# Patient Record
Sex: MALE | Race: WHITE | Employment: OTHER | URBAN - METROPOLITAN AREA
[De-identification: names, ages, dates, MRNs, and addresses within clinical notes are randomized per-mention and may not be internally consistent; named-entity substitution may affect disease eponyms.]

---

## 2017-01-04 ENCOUNTER — HOSPITAL ENCOUNTER (OUTPATIENT)
Dept: PHYSICAL THERAPY | Age: 82
Discharge: HOME | End: 2017-01-04
Payer: MEDICARE

## 2017-01-04 PROCEDURE — 97110 THERAPEUTIC EXERCISES: CPT

## 2017-01-04 NOTE — PROGRESS NOTES
PT DAILY TREATMENT NOTE - [facility]     Patient Name: Alex Forbes  Date:2017  : 1933  [x]  Patient  Verified  Payor: Essence Sharma / Plan: [place] MED[place] & B / Product Type: Medicare /    In time:10:25  Out time:11:15  Total Treatment Time (min): 50  Total Timed Codes (min): 40  1:1 Treatment Time ( [address] only): 40   Visit #: 2 of 12    Treatment Area: Leg pain, right [M79.604]    SUBJECTIVE  Pain Level (0-10 scale): 5/10  Any medication changes, allergies to medications, adverse drug reactions, diagnosis change, or new procedure performed?: [x] No    [] Yes (see summary sheet for update)  Subjective functional status/changes:   [] No changes reported  The pt reports that he has fallen twice since he was here last but he doesn't think that the falls have made his leg pain worse.     OBJECTIVE    Modality rationale: decrease inflammation and decrease pain to improve the patients ability to walk   Min Type Additional Details    [] Estim:  []Unatt       []IFC  []Premod                        []Other:  []w/ice   []w/heat  Position:  Location:    [] Estim: []Att    []TENS instruct  []NMES                    []Other:  []w/US   []w/ice   []w/heat  Position:  Location:    []  Traction: [] Cervical       []Lumbar                       [] Prone          []Supine                       []Intermittent   []Continuous Lbs:  [] before manual  [] after manual    []  Ultrasound: []Continuous   [] Pulsed                           []1MHz   []3MHz W/cm2:  Location:    []  Iontophoresis with dexamethasone         Location: [] Take home patch   [] In clinic   10 [x]  Ice     []  heat  []  Ice massage  []  Laser   []  Anodyne Position: supine, legs elevated  Location: right buttock/hamstring    []  Laser with stim  []  Other:  Position:  Location:    []  Vasopneumatic Device Pressure:       [] lo [] med [] hi   Temperature: [] lo [] med [] hi   [] Skin assessment post-treatment:  []intact []redness- no adverse reaction []redness  adverse reaction:      min []Eval                  []Re-Eval       40 min Therapeutic Exercise:  [x] See flow sheet :   Rationale: increase ROM and increase strength to improve the patients ability to walk     min Therapeutic Activity:  []  See flow sheet :   Rationale:       min Neuromuscular Re-education:  []  See flow sheet :   Rationale:      min Manual Therapy:     Rationale:      min Gait Training:  ___ feet with ___ device on level surfaces with ___ level of assist   Rationale: With   [] TE   [] TA   [] neuro   [] other: Patient Education: [x] Review HEP    [] Progressed/Changed HEP based on:   [] positioning   [] body mechanics   [] transfers   [] heat/ice application    [] other:      Other Objective/Functional Measures:      Pain Level (0-10 scale) post treatment: 2/10    ASSESSMENT/Changes in Function: The pt didn't have any significant increase in pain with exercises today but did have the most difficulty with weight shifting because shifting over his right LE aggravated him. He got good relief from ice. Patient will continue to benefit from skilled PT services to modify and progress therapeutic interventions, address functional mobility deficits, address ROM deficits, address strength deficits, analyze and cue movement patterns, analyze and modify body mechanics/ergonomics, address imbalance/dizziness and instruct in home and community integration to attain remaining goals.      [x]  See Plan of Care  []  See progress note/recertification  []  See Discharge Summary           PLAN  [x]  Upgrade activities as tolerated     [x]  Continue plan of care  []  Update interventions per flow sheet       []  Discharge due to:_  []  Other:_      Garret German, EULOGIO 1/4/2017  10:31 AM    Future Appointments  Date Time Provider Flaquita Gamez   1/5/2017 8:30 AM Shania Truong [facility]   1/6/2017 9:00 AM Judy Post PTA [facility]   1/9/2017 12:00 PM EULOGIO Truong [facility] 1/12/2017 1:00 PM Clint Santana PT [facility]   1/13/2017 10:30 AM Wolfgang Morris PT [facility]   1/17/2017 11:00 AM Wolfgang Morris PT [facility]   1/19/2017 10:30 AM [facility] PT MARISOLY [facility]   1/20/2017 11:00 AM [facility] PT VICTORY [facility]   1/24/2017 11:00 AM Grant Centeno PTA [facility]   1/26/2017 11:00 AM Clint Santana PT [facility]   1/27/2017 10:30 AM [facility] PT MARISOLY [facility]

## 2017-01-05 ENCOUNTER — HOSPITAL ENCOUNTER (OUTPATIENT)
Dept: PHYSICAL THERAPY | Age: 82
Discharge: HOME | End: 2017-01-05
Payer: MEDICARE

## 2017-01-05 PROCEDURE — 97110 THERAPEUTIC EXERCISES: CPT

## 2017-01-05 NOTE — PROGRESS NOTES
PT DAILY TREATMENT NOTE - [facility]     Patient Name: Nicole Romano  Date:2017  : 1933  [x]  Patient  Verified  Payor: [place] MEDICARE / Plan: [place] MEDICARE PART A & B / Product Type: Medicare /    In time:830  Out time:913  Total Treatment Time (min): 43  Total Timed Codes (min): 33  1:1 Treatment Time ( only): 33   Visit #: 3 of 12    Treatment Area: Leg pain, right [M79.604]    SUBJECTIVE  Pain Level (0-10 scale): 6/10  Any medication changes, allergies to medications, adverse drug reactions, diagnosis change, or new procedure performed?: [x] No    [] Yes (see summary sheet for update)  Subjective functional status/changes:   [x] No changes reported      OBJECTIVE    Modality rationale: decrease inflammation and decrease pain to improve the patients ability to increase activity/position tolerance   Min Type Additional Details    [] Estim:  []Unatt       []IFC  []Premod                        []Other:  []w/ice   []w/heat  Position:  Location:    [] Estim: []Att    []TENS instruct  []NMES                    []Other:  []w/US   []w/ice   []w/heat  Position:  Location:    []  Traction: [] Cervical       []Lumbar                       [] Prone          []Supine                       []Intermittent   []Continuous Lbs:  [] before manual  [] after manual    []  Ultrasound: []Continuous   [] Pulsed                           []1MHz   []3MHz W/cm2:  Location:    []  Iontophoresis with dexamethasone         Location: [] Take home patch   [] In clinic   10 [x]  Ice     []  heat  []  Ice massage  []  Laser   []  Anodyne Position: H/L supine  Location: right buttock/HS origin    []  Laser with stim  []  Other:  Position:  Location:    []  Vasopneumatic Device Pressure:       [] lo [] med [] hi   Temperature: [] lo [] med [] hi   [] Skin assessment post-treatment:  []intact []redness- no adverse reaction    []redness  adverse reaction:      min []Eval                  []Re-Eval       33 min Therapeutic Exercise:  [x] See flow sheet :   Rationale: increase ROM, increase strength, improve balance and increase proprioception to improve the patients ability to normalize gait and function     min Therapeutic Activity:  []  See flow sheet :         min Neuromuscular Re-education:  []  See flow sheet :        min Manual Therapy:          min Gait Training:  ___ feet with ___ device on level surfaces with ___ level of assist   Rationale: With   [] TE   [] TA   [] neuro   [] other: Patient Education: [x] Review HEP    [] Progressed/Changed HEP based on:   [] positioning   [] body mechanics   [] transfers   [] heat/ice application    [] other:      Other Objective/Functional Measures: see goal review     Pain Level (0-10 scale) post treatment: 4/10    ASSESSMENT/Changes in Function: Pt has difficulty transferring sit to stand from w/c requiring at least mod assist and transfer also complicated by brakes not locking on w/c. Patient will continue to benefit from skilled PT services to modify and progress therapeutic interventions, address functional mobility deficits, address ROM deficits, address strength deficits, analyze and cue movement patterns, assess and modify postural abnormalities and address imbalance/dizziness to attain remaining goals. []  See Plan of Care  []  See progress note/recertification  []  See Discharge Summary           Short Term Goals: To be accomplished in 6 treatments:  1)The pt will report no greater than 6/10 at worst to improve his tolerance to walking. Status at eval: 10/10 at worst  Current Status: 10/10 at worst     Long Term Goals: To be accomplished in 12 treatments:  1) Seated right hamstring strength will increase to 5/5 without pain to improve his tolerance to climbing stairs. Status at eval: 4-/5 with slight pain  Current Status: 4-/5 with pain     2) Right hip abduction strength will increase to 4/5 to provide pelvic stability for standing.   Status at eval: 3+/5 with pain  Current Status: no change     3) Right knee ext strength will increase to 5/5 to improve his ability to move from sit to stand.   Status at eval:4/5  Current Status: 4/5     PLAN  [x]  Upgrade activities as tolerated     [x]  Continue plan of care  []  Update interventions per flow sheet       []  Discharge due to:_  []  Other:_      Blanka Womack PT 1/5/2017  8:34 AM    Future Appointments  Date Time Provider Falquita Gamez   1/6/2017 9:00 AM Omari Caban PTA [facility]   1/9/2017 12:00 PM Blanka Laos, PT [facility]   1/12/2017 1:00 PM Blanka Laos, PT [facility]   1/13/2017 10:30 AM Blanka Laos, PT [facility]   1/17/2017 11:00 AM Stareagane Alexandra, PT [facility]   1/19/2017 10:30 AM Stareagane Alexandra, PT [facility] [place]   1/20/2017 11:00 AM [facility] PT VICTORY [facility]   1/24/2017 11:00 AM Omari Caban PTA [facility]   1/26/2017 11:00 AM Wolfgang Morris, PT [facility]   1/27/2017 10:30 AM [facility] PT VICTORY [facility]

## 2017-01-06 ENCOUNTER — HOSPITAL ENCOUNTER (OUTPATIENT)
Dept: PHYSICAL THERAPY | Age: 82
End: 2017-01-06
Payer: MEDICARE

## 2017-01-12 ENCOUNTER — HOSPITAL ENCOUNTER (OUTPATIENT)
Dept: PHYSICAL THERAPY | Age: 82
Discharge: HOME | End: 2017-01-12
Payer: MEDICARE

## 2017-01-12 PROCEDURE — 97110 THERAPEUTIC EXERCISES: CPT

## 2017-01-12 NOTE — PROGRESS NOTES
PT DAILY TREATMENT NOTE - [facility]     Patient Name: Sugar Randolph  Date:2017  : 1933  [x]  Patient  Verified  Payor: VA MEDICARE / Plan: VA MEDICARE PART A & B / Product Type: Medicare /    In time:1255  Out time:143  Total Treatment Time (min): 48  Total Timed Codes (min): 38  1:1 Treatment Time ( [address] only): 38   Visit #: 4 of 12    Treatment Area: Leg pain, right [M79.604]    SUBJECTIVE  Pain Level (0-10 scale): 4/10  Any medication changes, allergies to medications, adverse drug reactions, diagnosis change, or new procedure performed?: [] No    [x] Yes (see summary sheet for update)  Subjective functional status/changes:   [] No changes reported  I cancelled my appointment last Friday because I had to go to ER with this right leg. They diagnosed it as cellulitis and told me to follow-up with primary physician.     OBJECTIVE    Modality rationale: decrease inflammation and decrease pain to improve the patients ability to increase activity/position tolerance   Min Type Additional Details    [] Estim:  []Unatt       []IFC  []Premod                        []Other:  []w/ice   []w/heat  Position:  Location:    [] Estim: []Att    []TENS instruct  []NMES                    []Other:  []w/US   []w/ice   []w/heat  Position:  Location:    []  Traction: [] Cervical       []Lumbar                       [] Prone          []Supine                       []Intermittent   []Continuous Lbs:  [] before manual  [] after manual    []  Ultrasound: []Continuous   [] Pulsed                           []1MHz   []3MHz W/cm2:  Location:    []  Iontophoresis with dexamethasone         Location: [] Take home patch   [] In clinic   10 [x]  Ice     []  heat  []  Ice massage  []  Laser   []  Anodyne Position: H/L supine  Location:right buttock/HS region    []  Laser with stim  []  Other:  Position:  Location:    []  Vasopneumatic Device Pressure:       [] lo [] med [] hi   Temperature: [] lo [] med [] hi   [] Skin assessment post-treatment:  []intact []redness- no adverse reaction    []redness  adverse reaction:      min []Eval                  []Re-Eval       38 min Therapeutic Exercise:  [x] See flow sheet :   Rationale: increase ROM, increase strength, improve balance and increase proprioception to improve the patients ability to normalize gait and function     min Therapeutic Activity:  []  See flow sheet :         min Neuromuscular Re-education:  []  See flow sheet :        min Manual Therapy:          min Gait Training:  ___ feet with ___ device on level surfaces with ___ level of assist   Rationale: With   [] TE   [] TA   [] neuro   [] other: Patient Education: [x] Review HEP    [] Progressed/Changed HEP based on:   [] positioning   [] body mechanics   [] transfers   [] heat/ice application    [] other:      Other Objective/Functional Measures: none taken today     Pain Level (0-10 scale) post treatment: 2/10    ASSESSMENT/Changes in Function: No new progress with pt hospitalized in past week secondary to cellulitis. Patient will continue to benefit from skilled PT services to modify and progress therapeutic interventions, address functional mobility deficits, address ROM deficits, address strength deficits, analyze and address soft tissue restrictions, analyze and cue movement patterns, assess and modify postural abnormalities and address imbalance/dizziness to attain remaining goals. []  See Plan of Care  []  See progress note/recertification  []  See Discharge Summary         Progress towards goals / Updated goals:  Short Term Goals: To be accomplished in 6 treatments:  1)The pt will report no greater than 6/10 at worst to improve his tolerance to walking. Status at eval: 10/10 at worst  Current Status: 10/10 at worst with ER visit in past week      Long Term Goals:  To be accomplished in 12 treatments:  1) Seated right hamstring strength will increase to 5/5 without pain to improve his tolerance to climbing stairs. Status at eval: 4-/5 with slight pain  Current Status: no change      2) Right hip abduction strength will increase to 4/5 to provide pelvic stability for standing. Status at eval: 3+/5 with pain  Current Status: no change      3) Right knee ext strength will increase to 5/5 to improve his ability to move from sit to stand.   Status at eval:4/5  Current Status: no change    PLAN  [x]  Upgrade activities as tolerated     [x]  Continue plan of care  []  Update interventions per flow sheet       []  Discharge due to:_  []  Other:_      Romelia Scott PT 1/12/2017  1:02 PM    Future Appointments  Date Time Provider Flaquita Gamez   1/13/2017 10:30 AM EULOGIO JoynerHPTPETE [facility]   1/17/2017 11:00 AM Romelia Scott PT [facility]   1/19/2017 10:30 AM Romelia Scott PT [facility]   1/20/2017 11:00 AM Romelia Scott PT [facility]   1/24/2017 11:00 AM Gibran Govea PTA [facility]   1/26/2017 11:00 AM Romelia Scott PT [facility]   1/27/2017 10:30 AM Romelia Scott PT [facility]

## 2017-01-13 ENCOUNTER — HOSPITAL ENCOUNTER (OUTPATIENT)
Dept: PHYSICAL THERAPY | Age: 82
Discharge: HOME | End: 2017-01-13
Payer: MEDICARE

## 2017-01-13 PROCEDURE — 97110 THERAPEUTIC EXERCISES: CPT

## 2017-01-13 NOTE — PROGRESS NOTES
PT DAILY TREATMENT NOTE - [facility]     Patient Name: Sean Garcia  Date:2017  : 1933  [x]  Patient  Verified  Payor: VA MEDICARE / Plan: VA MEDICARE PART A & B / Product Type: Medicare /    In time:1030  Out time:1118  Total Treatment Time (min): 48  Total Timed Codes (min): 38  1:1 Treatment Time ( only): 38   Visit #: 5 of 12    Treatment Area: Leg pain, right [M79.604]    SUBJECTIVE  Pain Level (0-10 scale): 3/10  Any medication changes, allergies to medications, adverse drug reactions, diagnosis change, or new procedure performed?: [x] No    [] Yes (see summary sheet for update)  Subjective functional status/changes:   [x] No changes reported      OBJECTIVE    Modality rationale: decrease inflammation and decrease pain to improve the patients ability to increase activity/position tolerance   Min Type Additional Details    [] Estim:  []Unatt       []IFC  []Premod                        []Other:  []w/ice   []w/heat  Position:  Location:    [] Estim: []Att    []TENS instruct  []NMES                    []Other:  []w/US   []w/ice   []w/heat  Position:  Location:    []  Traction: [] Cervical       []Lumbar                       [] Prone          []Supine                       []Intermittent   []Continuous Lbs:  [] before manual  [] after manual    []  Ultrasound: []Continuous   [] Pulsed                           []1MHz   []3MHz W/cm2:  Location:    []  Iontophoresis with dexamethasone         Location: [] Take home patch   [] In clinic   10 [x]  Ice     []  heat  []  Ice massage  []  Laser   []  Anodyne Position: H/L supine  Location:right buttock/HS region    []  Laser with stim  []  Other:  Position:  Location:    []  Vasopneumatic Device Pressure:       [] lo [] med [] hi   Temperature: [] lo [] med [] hi   [] Skin assessment post-treatment:  []intact []redness- no adverse reaction    []redness  adverse reaction:      min []Eval                  []Re-Eval       38 min Therapeutic Exercise: [x] See flow sheet :   Rationale: increase ROM, increase strength, improve balance and increase proprioception to improve the patients ability in prep for standing and walking activity     min Therapeutic Activity:  []  See flow sheet :         min Neuromuscular Re-education:  []  See flow sheet :        min Manual Therapy:         min Gait Training:  ___ feet with ___ device on level surfaces with ___ level of assist   Rationale: With   [] TE   [] TA   [] neuro   [] other: Patient Education: [x] Review HEP    [] Progressed/Changed HEP based on:   [] positioning   [] body mechanics   [] transfers   [] heat/ice application    [] other:      Other Objective/Functional Measures:   FOTO score: 48/100    Pain Level (0-10 scale) post treatment: 4/10    ASSESSMENT/Changes in Function: No change in FOTO score from [facility]. Patient will continue to benefit from skilled PT services to modify and progress therapeutic interventions, address functional mobility deficits, address ROM deficits, address strength deficits, analyze and address soft tissue restrictions, analyze and cue movement patterns, assess and modify postural abnormalities and address imbalance/dizziness to attain remaining goals.      [x]  See Plan of Care  []  See progress note/recertification  []  See Discharge Summary      PLAN  [x]  Upgrade activities as tolerated     [x]  Continue plan of care  []  Update interventions per flow sheet       []  Discharge due to:_  [x]  Other: attempt ambulation with walker next visit as indicated/appropriate     Maddy Nielsen PT 1/13/2017  10:36 AM    Future Appointments  Date Time Provider Flaquita Gamez   1/17/2017 11:00 AM Maddy Nielsen PT [facility]   1/19/2017 10:30 AM Maddy Nielsen PT [facility]   1/20/2017 11:00 AM Maddy Nielsen PT [facility]   1/24/2017 11:00 AM Rohit Jeronimo, ALFRED WHITFIELD [facility]   1/26/2017 11:00 AM Maddy Nielsen, PT [facility]   1/27/2017 10:30 AM Maddy Nielsen, PT [facility]

## 2017-01-17 ENCOUNTER — HOSPITAL ENCOUNTER (OUTPATIENT)
Dept: PHYSICAL THERAPY | Age: 82
Discharge: HOME | End: 2017-01-17
Payer: MEDICARE

## 2017-01-17 PROCEDURE — 97110 THERAPEUTIC EXERCISES: CPT

## 2017-01-17 NOTE — PROGRESS NOTES
PT DAILY TREATMENT NOTE - [facility]     Patient Name: Dawit Kirby  Date:2017  : 1933  [x]  Patient  Verified  Payor: [place] MEDICARE / Plan: [place] MEDICARE PART A & B / Product Type: Medicare /    In time:1055  Out time:1148  Total Treatment Time (min): 53  Total Timed Codes (min): 43  1:1 Treatment Time ( [address] only): 37   Visit #: 6 of 12    Treatment Area: Leg pain, right [M79.604]    SUBJECTIVE  Pain Level (0-10 scale): 10  Any medication changes, allergies to medications, adverse drug reactions, diagnosis change, or new procedure performed?: [x] No    [] Yes (see summary sheet for update)  Subjective functional status/changes:   [] No changes reported  I decided today I was going to walk with the walker.     OBJECTIVE    Modality rationale: decrease inflammation and decrease pain to improve the patients ability to increase activity/position tolerance   Min Type Additional Details    [] Estim:  []Unatt       []IFC  []Premod                        []Other:  []w/ice   []w/heat  Position:  Location:    [] Estim: []Att    []TENS instruct  []NMES                    []Other:  []w/US   []w/ice   []w/heat  Position:  Location:    []  Traction: [] Cervical       []Lumbar                       [] Prone          []Supine                       []Intermittent   []Continuous Lbs:  [] before manual  [] after manual    []  Ultrasound: []Continuous   [] Pulsed                           []1MHz   []3MHz W/cm2:  Location:    []  Iontophoresis with dexamethasone         Location: [] Take home patch   [] In clinic   10 [x]  Ice     []  heat  []  Ice massage  []  Laser   []  Anodyne Position: H/L supine  Location: right buttock/HS origin    []  Laser with stim  []  Other:  Position:  Location:    []  Vasopneumatic Device Pressure:       [] lo [] med [] hi   Temperature: [] lo [] med [] hi   [] Skin assessment post-treatment:  []intact []redness- no adverse reaction    []redness  adverse reaction:      min []Eval []Re-Eval       43 min Therapeutic Exercise:  [x] See flow sheet :   Rationale: increase ROM, increase strength, improve balance and increase proprioception to improve the patients ability to normalize gait and function     min Therapeutic Activity:  []  See flow sheet :         min Neuromuscular Re-education:  []  See flow sheet :        min Manual Therapy:          min Gait Training:  ___ feet with ___ device on level surfaces with ___ level of assist   Rationale: With   [] TE   [] TA   [] neuro   [] other: Patient Education: [x] Review HEP    [] Progressed/Changed HEP based on:   [] positioning   [] body mechanics   [] transfers   [] heat/ice application    [] other:      Other Objective/Functional Measures: see goal review     Pain Level (0-10 scale) post treatment: 3/10    ASSESSMENT/Changes in Function: Pt ambulating into clinic today with RW with decreased eloy and forward flexed posture. He was able to continue to ambulate short distances in clinic with RW and SBA-supervision of therapist. Pain intensity slowly decreasing but still relatively high. Right HS strength mildly improving at best.  Pt has tendency to post lean with sit to stand transfers resulting in LOB backwards. He is able to correct with verbal and manual cues. Patient will continue to benefit from skilled PT services to modify and progress therapeutic interventions, address functional mobility deficits, address ROM deficits, address strength deficits, analyze and address soft tissue restrictions, analyze and cue movement patterns, assess and modify postural abnormalities and address imbalance/dizziness to attain remaining goals. []  See Plan of Care  []  See progress note/recertification  []  See Discharge Summary         Progress towards goals / Updated goals:  Short Term Goals: To be accomplished in 6 treatments:  1)The pt will report no greater than 6/10 at worst to improve his tolerance to walking.   Status at eval: 10/10 at worst  Current Status: slowly progressin/10 at worst      Long Term Goals: To be accomplished in 12 treatments:  1) Seated right hamstring strength will increase to 5/5 without pain to improve his tolerance to climbing stairs. Status at eval: 4-/5 with slight pain  Current Status: not met: 4/5 at best with slight pain      2) Right hip abduction strength will increase to 4/5 to provide pelvic stability for standing. Status at eval: 3+/5 with pain  Current Status: no change      3) Right knee ext strength will increase to 5/5 to improve his ability to move from sit to stand.   Status at eval:4/5  Current Status: not met: 4/5    PLAN  [x]  Upgrade activities as tolerated     [x]  Continue plan of care  []  Update interventions per flow sheet       []  Discharge due to:_  [x]  Other: progress amb with RW and standing exercises as tolerated    Rochelle Das PT 2017  10:53 AM    Future Appointments  Date Time Provider Flaquita Gamez   2017 11:00 AM EULOGIO Melo [facility]   2017 10:30 AM ALFRED Batista [facility]   2017 11:00 AM EULOGIO Melo [facility]   2017 11:00 AM ALFRED Batista [facility]   2017 11:00 AM EULOGIO Melo [facility]   2017 10:30 AM EULOGIO Melo [facility]

## 2017-01-19 ENCOUNTER — HOSPITAL ENCOUNTER (OUTPATIENT)
Dept: PHYSICAL THERAPY | Age: 82
Discharge: HOME | End: 2017-01-19
Payer: MEDICARE

## 2017-01-19 PROCEDURE — 97110 THERAPEUTIC EXERCISES: CPT

## 2017-01-19 NOTE — PROGRESS NOTES
PT DAILY TREATMENT NOTE - [facility]     Patient Name: Thom Knott  Date:2017  : 1933  [x]  Patient  Verified  Payor: [place] MEDICARE / Plan: [place] MEDICARE PART A & B / Product Type: Medicare /    In time:1035  Out time:1143  Total Treatment Time (min): 68  Total Timed Codes (min): 55  1:1 Treatment Time ([address] only): 40   Visit #: 7 of 12    Treatment Area: Leg pain, right [M79.604]    SUBJECTIVE  Pain Level (0-10 scale): 7/10  Any medication changes, allergies to medications, adverse drug reactions, diagnosis change, or new procedure performed?: [x] No    [] Yes (see summary sheet for update)  Subjective functional status/changes:   [] No changes reported  I did a lot yesterday on my feet.     OBJECTIVE    Modality rationale: decrease inflammation and decrease pain to improve the patients ability to improve gait and ADL's   Min Type Additional Details    [] Estim:  []Unatt       []IFC  []Premod                        []Other:  []w/ice   []w/heat  Position:  Location:    [] Estim: []Att    []TENS instruct  []NMES                    []Other:  []w/US   []w/ice   []w/heat  Position:  Location:    []  Traction: [] Cervical       []Lumbar                       [] Prone          []Supine                       []Intermittent   []Continuous Lbs:  [] before manual  [] after manual    []  Ultrasound: []Continuous   [] Pulsed                           []1MHz   []3MHz W/cm2:  Location:    []  Iontophoresis with dexamethasone         Location: [] Take home patch   [] In clinic   10 [x]  Ice     []  heat  []  Ice massage  []  Laser   []  Anodyne Position:HL  Location:right proximal HS    []  Laser with stim  []  Other:  Position:  Location:    []  Vasopneumatic Device Pressure:       [] lo [] med [] hi   Temperature: [] lo [] med [] hi   [x] Skin assessment post-treatment:  [x]intact []redness- no adverse reaction    []redness  adverse reaction:      min []Eval                  []Re-Eval       55 (40) min Therapeutic Exercise:  [x] See flow sheet :   Rationale: increase ROM, increase strength and improve coordination to improve the patients ability to improve gait and function     min Therapeutic Activity:  []  See flow sheet :         min Neuromuscular Re-education:  []  See flow sheet :        min Manual Therapy:          min Gait Training:  ___ feet with ___ device on level surfaces with ___ level of assist   Rationale: With   [] TE   [] TA   [] neuro   [] other: Patient Education: [x] Review HEP    [] Progressed/Changed HEP based on:   [] positioning   [] body mechanics   [] transfers   [] heat/ice application    [] other:      Other Objective/Functional Measures:      Pain Level (0-10 scale) post treatment: 3/10    ASSESSMENT/Changes in Function: Pt reported improvements in right LE pain at end of tx today. Pt ambulating using RW without safety concerns. Patient will continue to benefit from skilled PT services to modify and progress therapeutic interventions, address functional mobility deficits, address ROM deficits, address strength deficits, analyze and address soft tissue restrictions, analyze and cue movement patterns, analyze and modify body mechanics/ergonomics and instruct in home and community integration to attain remaining goals. []  See Plan of Care  []  See progress note/recertification  []  See Discharge Summary         Progress towards goals / Updated goals:  Short Term Goals: To be accomplished in 6 treatments:  1)The pt will report no greater than 6/10 at worst to improve his tolerance to walking. Status at eval: 10/10 at worst  Current Status: slowly progressin/10 at worst      Long Term Goals: To be accomplished in 12 treatments:  1) Seated right hamstring strength will increase to 5/5 without pain to improve his tolerance to climbing stairs.   Status at eval: 4-/5 with slight pain  Current Status: not met: 4/5 at best with slight pain      2) Right hip abduction strength will increase to 4/5 to provide pelvic stability for standing. Status at eval: 3+/5 with pain  Current Status: no change      3) Right knee ext strength will increase to 5/5 to improve his ability to move from sit to stand.   Status at eval:4/5  Current Status: not met: 4/5       PLAN  [x]  Upgrade activities as tolerated     [x]  Continue plan of care  []  Update interventions per flow sheet       []  Discharge due to:_  []  Other:_      Samreen Flores, ALFRED 1/19/2017  11:41 AM    Future Appointments  Date Time Provider Flaquita Gamez   1/20/2017 11:00 AM Cezar May PT NA[facility]   1/24/2017 11:00 AM EULOGIO Clark [facility]   1/26/2017 11:00 AM Cezar May PT [facility]   1/27/2017 10:30 AM Cezar May PT NAH[facility]

## 2017-01-20 ENCOUNTER — HOSPITAL ENCOUNTER (OUTPATIENT)
Dept: PHYSICAL THERAPY | Age: 82
Discharge: HOME | End: 2017-01-20
Payer: MEDICARE

## 2017-01-20 PROCEDURE — 97110 THERAPEUTIC EXERCISES: CPT

## 2017-01-20 NOTE — PROGRESS NOTES
PT DAILY TREATMENT NOTE - [facility]     Patient Name: Hernandez Baptiste  Date:2017  : 1933  [x]  Patient  Verified  Payor: [place] Harrington / Plan: [place] MEDICARE PART A & B / Product Type: Medicare /    In time:1053  Out time:1143  Total Treatment Time (min): 50  Total Timed Codes (min): 40  1:1 Treatment Time ( only): 23   Visit #: 8 of 12    Treatment Area: Leg pain, right [M79.604]    SUBJECTIVE  Pain Level (0-10 scale): 610  Any medication changes, allergies to medications, adverse drug reactions, diagnosis change, or new procedure performed?: [x] No    [] Yes (see summary sheet for update)  Subjective functional status/changes:   [] No changes reported  I have a radio nerve ablation procedure scheduled for  with pain management.     OBJECTIVE    Modality rationale:    Min Type Additional Details    [] Estim:  []Unatt       []IFC  []Premod                        []Other:  []w/ice   []w/heat  Position:  Location:    [] Estim: []Att    []TENS instruct  []NMES                    []Other:  []w/US   []w/ice   []w/heat  Position:  Location:    []  Traction: [] Cervical       []Lumbar                       [] Prone          []Supine                       []Intermittent   []Continuous Lbs:  [] before manual  [] after manual    []  Ultrasound: []Continuous   [] Pulsed                           []1MHz   []3MHz W/cm2:  Location:    []  Iontophoresis with dexamethasone         Location: [] Take home patch   [] In clinic    []  Ice     []  heat  []  Ice massage  []  Laser   []  Anodyne Position:  Location:    []  Laser with stim  []  Other:  Position:  Location:    []  Vasopneumatic Device Pressure:       [] lo [] med [] hi   Temperature: [] lo [] med [] hi   [] Skin assessment post-treatment:  []intact []redness- no adverse reaction    []redness  adverse reaction:      min []Eval                  []Re-Eval       40 min Therapeutic Exercise:  [x] See flow sheet : added 3 way hip in standing right LE Rationale: increase ROM, increase strength, improve balance and increase proprioception to improve the patients ability to normalize gait and function     min Therapeutic Activity:  []  See flow sheet :         min Neuromuscular Re-education:  []  See flow sheet :        min Manual Therapy:          min Gait Training:  ___ feet with ___ device on level surfaces with ___ level of assist   Rationale: With   [] TE   [] TA   [] neuro   [] other: Patient Education: [x] Review HEP    [] Progressed/Changed HEP based on:   [] positioning   [] body mechanics   [] transfers   [] heat/ice application    [] other:      Other Objective/Functional Measures: none taken today     Pain Level (0-10 scale) post treatment: 3/10    ASSESSMENT/Changes in Function: No new progress. Patient will continue to benefit from skilled PT services to modify and progress therapeutic interventions, address functional mobility deficits, address ROM deficits, address strength deficits, analyze and address soft tissue restrictions, analyze and cue movement patterns, analyze and modify body mechanics/ergonomics, assess and modify postural abnormalities and address imbalance/dizziness to attain remaining goals.      [x]  See Plan of Care  []  See progress note/recertification  []  See Discharge Summary             PLAN  [x]  Upgrade activities as tolerated     [x]  Continue plan of care  []  Update interventions per flow sheet       []  Discharge due to:_  [x]  Other: recert next week to continue PT     Tra Hahn PT 1/20/2017  11:16 AM    Future Appointments  Date Time Provider Flaquita Gamez   1/24/2017 11:00 AM EULOGIO Cooper [facility]   1/26/2017 11:00 AM EULOGIO Moran [facility]   1/27/2017 10:30 AM Tra Hahn, EULOGIO WHITFIELD [facility]

## 2017-01-24 ENCOUNTER — HOSPITAL ENCOUNTER (OUTPATIENT)
Dept: PHYSICAL THERAPY | Age: 82
Discharge: HOME | End: 2017-01-24
Payer: MEDICARE

## 2017-01-24 PROCEDURE — 97110 THERAPEUTIC EXERCISES: CPT

## 2017-01-24 NOTE — PROGRESS NOTES
PT DAILY TREATMENT NOTE - [facility]     Patient Name: Gavin Bruce  Date:2017  : 1933  [x]  Patient  Verified  Payor: Sadi Solders / Plan: [place] MEDICARE PART A & B / Product Type: Medicare /    In time:10:55  Out time:11:53  Total Treatment Time (min): 58  Total Timed Codes (min): 48  1:1 Treatment Time ( [address] only): 38   Visit #: 9 of 12    Treatment Area: Leg pain, right [M79.604]    SUBJECTIVE  Pain Level (0-10 scale): 5  Any medication changes, allergies to medications, adverse drug reactions, diagnosis change, or new procedure performed?: [x] No    [] Yes (see summary sheet for update)  Subjective functional status/changes:   [] No changes reported  \"It is hurting a little more today when I walk. I did not do anything different. Maybe it is the weather? \"     OBJECTIVE     Modality rationale: decrease inflammation and decrease pain to improve the patients ability to tolerate ambulation   Min Type Additional Details    [] Estim:  []Unatt       []IFC  []Premod                        []Other:  []w/ice   []w/heat  Position:  Location:    [] Estim: []Att    []TENS instruct  []NMES                    []Other:  []w/US   []w/ice   []w/heat  Position:  Location:    []  Traction: [] Cervical       []Lumbar                       [] Prone          []Supine                       []Intermittent   []Continuous Lbs:  [] before manual  [] after manual    []  Ultrasound: []Continuous   [] Pulsed                           []1MHz   []3MHz Location:  W/cm2:    []  Iontophoresis with dexamethasone         Location: [] Take home patch   [] In clinic   10 [x]  Ice     []  heat  []  Ice massage  []  Laser   []  Anodyne Position: supine with LEs on wedge  Location: right HS and lower glutes    []  Laser with stim  []  Other: Position:  Location:    []  Vasopneumatic Device Pressure:       [] lo [] med [] hi   Temperature: [] lo [] med [] hi   [] Skin assessment post-treatment:  []intact []redness- no adverse reaction []redness  adverse reaction:     48 min Therapeutic Exercise:  [x] See flow sheet :   Rationale: increase ROM and increase strength to improve the patients ability to tolerate ADLs          With   [] TE   [] TA   [] neuro   [] other: Patient Education: [x] Review HEP    [] Progressed/Changed HEP based on:   [] positioning   [] body mechanics   [] transfers   [] heat/ice application    [] other:      Other Objective/Functional Measures: Increased reps for marches, increased weight for LAQ and SAQ to 2#, increased to green tband for [address] to improve strength in the LEs. Pain Level (0-10 scale) post treatment: 4    ASSESSMENT/Changes in Function: Mild decrease in pain reported at the end of the session. Challenged with SAQ with 2# weight on the right LE secondary to report of having some pain and weakness in the right LE. Challenged with increased reps of marches (with noted increased forward trunk lean) secondary to pt report of having mild pain with this exercise in the right HS region but also report of fatigue. No increase in pain reported with other exercises today. Continue POC as tolerated. Patient will continue to benefit from skilled PT services to modify and progress therapeutic interventions, address functional mobility deficits, address ROM deficits, address strength deficits, analyze and address soft tissue restrictions, analyze and cue movement patterns, analyze and modify body mechanics/ergonomics, address imbalance/dizziness and instruct in home and community integration to attain remaining goals. []  See Plan of Care  []  See progress note/recertification  []  See Discharge Summary         Progress towards goals / Updated goals:  Short Term Goals: To be accomplished in 6 treatments:  1)The pt will report no greater than 6/10 at worst to improve his tolerance to walking. Status at eval: 10/10 at worst  Current Status: slowly progressin/10 at worst      Long Term Goals:  To be accomplished in 12 treatments:  1) Seated right hamstring strength will increase to 5/5 without pain to improve his tolerance to climbing stairs. Status at eval: 4-/5 with slight pain  Current Status: not met: 4/5 at best with slight pain      2) Right hip abduction strength will increase to 4/5 to provide pelvic stability for standing. Status at eval: 3+/5 with pain  Current Status: no change      3) Right knee ext strength will increase to 5/5 to improve his ability to move from sit to stand.   Status at eval:4/5  Current Status: not met: 4/5     PLAN  [x]  Upgrade activities as tolerated     [x]  Continue plan of care  [x]  Update interventions per flow sheet       []  Discharge due to:_  []  Other:_      Racheal Garcia PT 1/24/2017  11:15 AM    Future Appointments  Date Time Provider Flaquita Gamez   1/24/2017 11:00 AM EULOGIO StantonHPANDREEA [facility]   1/26/2017 11:00 AM Radha Trammell, PT [facility]   1/27/2017 10:30 AM Radha [place], PT [facility]   1/31/2017 2:30 PM Vaughn Mcgee, PTA [facility]   2/1/2017 2:30 PM Yaneth Mendoza, PT [facility]   2/3/2017 2:30 PM Ti Trujillo, PT [facility]   2/6/2017 2:30 PM Radha [place], PT [facility]   2/9/2017 10:30 AM Radha [place], PT [facility]   2/10/2017 10:30 AM Radha [place], PT [facility]

## 2017-01-26 ENCOUNTER — HOSPITAL ENCOUNTER (OUTPATIENT)
Dept: PHYSICAL THERAPY | Age: 82
Discharge: HOME | End: 2017-01-26
Payer: MEDICARE

## 2017-01-26 PROCEDURE — G8979 MOBILITY GOAL STATUS: HCPCS

## 2017-01-26 PROCEDURE — 97110 THERAPEUTIC EXERCISES: CPT

## 2017-01-26 PROCEDURE — G8978 MOBILITY CURRENT STATUS: HCPCS

## 2017-01-26 NOTE — PROGRESS NOTES
[facility] at [address] [place] [address]  Phone: [phone]   Fax: [phone]    Continued Plan of Care/ Re-certification for Physical Therapy Services    Patient name: Morgan Osler Start of Care: 16   Referral source: Johnnie Garcia MD : 1933   Medical/Treatment Diagnosis: Leg pain, right [M79.604] Onset Date:about one month ago     Prior Hospitalization: see medical history Provider#: [id number]   Medications: Verified on Patient Summary List    Comorbidities: diabetes, arthritis, high blood pressure, hearing impaired, peripheral neuropathy, Right TKR, Right ankle replacement  Prior Level of Function:Pt used cane for ambulation due to neuropathy but had less pain with walking and standing    Visits from Start of Care: 10    Missed Visits: 1    The Plan of Care and following information is based on the patient's current status:  Short Term Goals: To be accomplished in 6 treatments:  1)The pt will report no greater than 6/10 at worst to improve his tolerance to walking. Status at eval: 10/10 at worst  Current Status: slowly progressin/10 at worst      Long Term Goals: To be accomplished in 12 treatments:  1) Seated right hamstring strength will increase to 5/5 without pain to improve his tolerance to climbing stairs. Status at eval: 4-/5 with slight pain  Current Status: not met: 4/5 at best with slight pain      2) Right hip abduction strength will increase to 4/5 to provide pelvic stability for standing. Status at eval: 3+/5 with pain  Current Status: no change      3) Right knee ext strength will increase to 5/5 to improve his ability to move from sit to stand. Status at eval:4/5  Current Status: not met: 4/5    Key functional changes: Pt ambulating short distances with RW without safety concerns. Problems/ barriers to goal attainment: Right LE pain (HS region) persists.   Pt experienced an onset of right LE cellulitis at beginning of January for which he had to see medical attention for and limited his progress and functional abilities at that time. Problem List: pain affecting function, decrease ROM, decrease strength, edema affecting function, impaired gait/ balance, decrease ADL/ functional abilitiies, decrease activity tolerance, decrease flexibility/ joint mobility and decrease transfer abilities    Treatment Plan: Therapeutic exercise, Therapeutic activities, Neuromuscular re-education, Physical agent/modality, Gait/balance training, Manual therapy, Aquatic therapy, Patient education and Functional mobility training     Patient Goal (s) has been updated and includes: \"I want the pain to go away in this leg and for this ligament tear to heal\"     Goals for this certification period to be accomplished in 4 weeks:  Continue with unmet goals above. Frequency / Duration: Patient to be seen 3 times per week for 4 weeks:    Assessment / Recommendations:Pain intensity slowly decreasing but still relatively high. Right HS strength mildly improving at best. Pt has tendency to post lean with sit to stand transfers resulting in LOB backwards. He is able to correct with verbal and manual cues. Treatment has included pelvic and LE strengthening, mainly supine and seated exercises with recent progression to standing exercises as tolerated. Pt would benefit from additional PT intervention to continue to address pain, strength deficits and functional mobility limitations with transfer and gait. G-Codes (GP)  Mobility  [id number] Current  CK= 40-59%  [id number] Goal  CK= 40-59%    The severity rating is based on clinical judgment and the FOTO score. Certification Period: 1/27/17 - 2/25/17    Peri Ramos, PT 1/26/2017 9:52 AM    ________________________________________________________________________  I certify that the above Therapy Services are being furnished while the patient is under my care.  I agree with the treatment plan and certify that this therapy is necessary. [] I have read the above and request that my patient continue as recommended.   [] I have read the above report and request that my patient continue therapy with the following changes/special instructions: ______________________________________  [] I have read the above report and request that my patient be discharged from therapy    Physician's Signature:_______________________________Date:___________Time:__________    Please sign and return to   [facility] at [address]  Phone: [phone]   Fax: [phone]

## 2017-01-26 NOTE — PROGRESS NOTES
PT DAILY TREATMENT NOTE - [facility]     Patient Name: Michelle Kevin  Date:2017  : 1933  [x]  Patient  Verified  Payor: Mary Blanco / Plan: [place] MED[place] & B / Product Type: Medicare /    In time:1100  Out time:1148  Total Treatment Time (min): 48  Total Timed Codes (min): 38  1:1 Treatment Time ( only): 38   Visit #: 10 of 12    Treatment Area: Leg pain, right [M79.604]    SUBJECTIVE  Pain Level (0-10 scale): 6/10  Any medication changes, allergies to medications, adverse drug reactions, diagnosis change, or new procedure performed?: [x] No    [] Yes (see summary sheet for update)  Subjective functional status/changes:   [x] No changes reported      OBJECTIVE    Modality rationale: decrease inflammation and decrease pain to improve the patients ability to increase activity/position tolerance   Min Type Additional Details    [] Estim:  []Unatt       []IFC  []Premod                        []Other:  []w/ice   []w/heat  Position:  Location:    [] Estim: []Att    []TENS instruct  []NMES                    []Other:  []w/US   []w/ice   []w/heat  Position:  Location:    []  Traction: [] Cervical       []Lumbar                       [] Prone          []Supine                       []Intermittent   []Continuous Lbs:  [] before manual  [] after manual    []  Ultrasound: []Continuous   [] Pulsed                           []1MHz   []3MHz W/cm2:  Location:    []  Iontophoresis with dexamethasone         Location: [] Take home patch   [] In clinic   10 [x]  Ice     []  heat  []  Ice massage  []  Laser   []  Anodyne Position: H/L supine  Location:right HS origin region    []  Laser with stim  []  Other:  Position:  Location:    []  Vasopneumatic Device Pressure:       [] lo [] med [] hi   Temperature: [] lo [] med [] hi   [] Skin assessment post-treatment:  []intact []redness- no adverse reaction    []redness  adverse reaction:      min []Eval                  []Re-Eval       38 min Therapeutic Exercise: [x] See flow sheet :   Rationale: increase ROM, increase strength, improve coordination, improve balance and increase proprioception to improve the patients ability to normalize gait and function     min Therapeutic Activity:  []  See flow sheet :         min Neuromuscular Re-education:  []  See flow sheet :        min Manual Therapy:          min Gait Training:  ___ feet with ___ device on level surfaces with ___ level of assist   Rationale: With   [] TE   [] TA   [] neuro   [] other: Patient Education: [x] Review HEP    [] Progressed/Changed HEP based on:   [] positioning   [] body mechanics   [] transfers   [] heat/ice application    [] other:      Other Objective/Functional Measures:   FOTO score: 48/100     Pain Level (0-10 scale) post treatment: 4/10    ASSESSMENT/Changes in Function: No improvement in FOTO score from [facility]. Patient will continue to benefit from skilled PT services to modify and progress therapeutic interventions, address functional mobility deficits, address ROM deficits, address strength deficits, analyze and address soft tissue restrictions, analyze and cue movement patterns, assess and modify postural abnormalities and address imbalance/dizziness to attain remaining goals.      []  See Plan of Care  [x]  See progress note/recertification  []  See Discharge Summary           PLAN  [x]  Upgrade activities as tolerated     [x]  Continue plan of care  []  Update interventions per flow sheet       []  Discharge due to:_  []  Other:_      Fer Cadena PT 1/26/2017  10:57 AM    Future Appointments  Date Time Provider Flaquita Gamez   1/26/2017 11:00 AM EULOGIO Cao [facility]   1/27/2017 10:30 AM EULOGIO Cao [facility]   1/31/2017 2:30 PM ALFRED Mendez [facility]   2/1/2017 2:30 PM EULOGIO Mccauley [facility]   2/3/2017 2:30 PM Shania Mccauley [facility]   2/6/2017 2:30 PM EULOGIO Cao [facility]   2/9/2017 10:30 AM EULOGIO Cao [facility] 2/10/2017 10:30 AM Dee Mariee PT [facility] THE [facility]

## 2017-01-27 ENCOUNTER — HOSPITAL ENCOUNTER (OUTPATIENT)
Dept: PHYSICAL THERAPY | Age: 82
Discharge: HOME | End: 2017-01-27
Payer: MEDICARE

## 2017-01-27 PROCEDURE — 97110 THERAPEUTIC EXERCISES: CPT

## 2017-01-27 NOTE — PROGRESS NOTES
PT DAILY TREATMENT NOTE - [facility]     Patient Name: Alex Forbes  Date:2017  : 1933  [x]  Patient  Verified  Payor: Essence Sharma / Plan: [place] MEDICARE PART A & B / Product Type: Medicare /    In time:1030  Out time:1120  Total Treatment Time (min): 50  Total Timed Codes (min): 40  1:1 Treatment Time ( [address] only): 40   Visit #:  of 22    Treatment Area: Leg pain, right [M79.604]    SUBJECTIVE  Pain Level (0-10 scale): 5/10  Any medication changes, allergies to medications, adverse drug reactions, diagnosis change, or new procedure performed?: [x] No    [] Yes (see summary sheet for update)  Subjective functional status/changes:   [x] No changes reported      OBJECTIVE    Modality rationale: decrease inflammation and decrease pain to improve the patients ability to increase activity   Min Type Additional Details    [] Estim:  []Unatt       []IFC  []Premod                        []Other:  []w/ice   []w/heat  Position:  Location:    [] Estim: []Att    []TENS instruct  []NMES                    []Other:  []w/US   []w/ice   []w/heat  Position:  Location:    []  Traction: [] Cervical       []Lumbar                       [] Prone          []Supine                       []Intermittent   []Continuous Lbs:  [] before manual  [] after manual    []  Ultrasound: []Continuous   [] Pulsed                           []1MHz   []3MHz W/cm2:  Location:    []  Iontophoresis with dexamethasone         Location: [] Take home patch   [] In clinic   10 [x]  Ice     []  heat  []  Ice massage  []  Laser   []  Anodyne Position: H/L supine  Location: right HS origin region    []  Laser with stim  []  Other:  Position:  Location:    []  Vasopneumatic Device Pressure:       [] lo [] med [] hi   Temperature: [] lo [] med [] hi   [] Skin assessment post-treatment:  []intact []redness- no adverse reaction    []redness  adverse reaction:      min []Eval                  []Re-Eval       40 min Therapeutic Exercise:  [x] See flow sheet : Rationale: increase ROM, increase strength, improve coordination, improve balance and increase proprioception to improve the patients ability to normalize gait and function     min Therapeutic Activity:  []  See flow sheet :        min Neuromuscular Re-education:  []  See flow sheet :        min Manual Therapy:         min Gait Training:  ___ feet with ___ device on level surfaces with ___ level of assist   Rationale: With   [] TE   [] TA   [] neuro   [] other: Patient Education: [x] Review HEP    [] Progressed/Changed HEP based on:   [] positioning   [] body mechanics   [] transfers   [] heat/ice application    [] other:      Other Objective/Functional Measures:   Tahir Weeks PTA spent first 15 min of patient's treatment 1:1 with patient to allow for billable time as treating therapist was with another [facility] patient. Pain Level (0-10 scale) post treatment: 3/10    ASSESSMENT/Changes in Function: No new progress. Patient will continue to benefit from skilled PT services to modify and progress therapeutic interventions, address functional mobility deficits, address ROM deficits, address strength deficits, analyze and address soft tissue restrictions, analyze and cue movement patterns, assess and modify postural abnormalities, address imbalance/dizziness and instruct in home and community integration to attain remaining goals.      []  See Plan of Care  [x]  See progress note/recertification  []  See Discharge Summary           PLAN  [x]  Upgrade activities as tolerated     [x]  Continue plan of care  []  Update interventions per flow sheet       []  Discharge due to:_  []  Other:_      Blanka Womack PT 1/27/2017  10:28 AM    Future Appointments  Date Time Provider Flaquita Gamez   1/27/2017 10:30 AM Blanka Womack PT [facility]PTVJUDD [facility]   1/31/2017 2:30 PM Omari Caban PTA [facility]ANDREEA [facility]   2/1/2017 2:30 PM Shania Gómez THE [facility]   2/3/2017 2:30 PM Dot Shania Baldwin [facility]   2/6/2017 2:30 PM EULOGIO HairTPETE THE [facility]   2/9/2017 10:30 AM EULOGIO Hair [facility]   2/10/2017 10:30 AM EULOGIO Hair THE [facility]

## 2017-01-31 ENCOUNTER — HOSPITAL ENCOUNTER (OUTPATIENT)
Dept: PHYSICAL THERAPY | Age: 82
Discharge: HOME | End: 2017-01-31
Payer: MEDICARE

## 2017-01-31 PROCEDURE — 97140 MANUAL THERAPY 1/> REGIONS: CPT

## 2017-01-31 PROCEDURE — 97110 THERAPEUTIC EXERCISES: CPT

## 2017-01-31 NOTE — PROGRESS NOTES
PT DAILY TREATMENT NOTE - [facility]     Patient Name: Dawit Kirby  Date:2017  : 1933  [x]  Patient  Verified  Payor: Beny Crane / Plan: [place] MEDICARE PART A & B / Product Type: Medicare /    In time:5  Out time:1518  Total Treatment Time (min): 53  Total Timed Codes (min): 43  1:1 Treatment Time ( only): 37   Visit #: 12 of 22    Treatment Area: Leg pain, right [M79.604]    SUBJECTIVE  Pain Level (0-10 scale): 5/10  Any medication changes, allergies to medications, adverse drug reactions, diagnosis change, or new procedure performed?: [x] No    [] Yes (see summary sheet for update)  Subjective functional status/changes:   [] No changes reported  That right leg is hurting.     OBJECTIVE    Modality rationale: decrease pain and increase tissue extensibility to improve the patients ability to improve gait and function   Min Type Additional Details    [] Estim:  []Unatt       []IFC  []Premod                        []Other:  []w/ice   []w/heat  Position:  Location:    [] Estim: []Att    []TENS instruct  []NMES                    []Other:  []w/US   []w/ice   []w/heat  Position:  Location:    []  Traction: [] Cervical       []Lumbar                       [] Prone          []Supine                       []Intermittent   []Continuous Lbs:  [] before manual  [] after manual    []  Ultrasound: []Continuous   [] Pulsed                           []1MHz   []3MHz W/cm2:  Location:    []  Iontophoresis with dexamethasone         Location: [] Take home patch   [] In clinic   10 []  Ice     [x]  heat  []  Ice massage  []  Laser   []  Anodyne Position:sidelying  Location:right ITB    []  Laser with stim  []  Other:  Position:  Location:    []  Vasopneumatic Device Pressure:       [] lo [] med [] hi   Temperature: [] lo [] med [] hi   [x] Skin assessment post-treatment:  [x]intact []redness- no adverse reaction    []redness  adverse reaction:      min []Eval                  []Re-Eval       35 min Therapeutic Exercise:  [x] See flow sheet :   Rationale: increase ROM, increase strength, improve coordination, improve balance and increase proprioception to improve the patients ability to normalize gait and function     min Therapeutic Activity:  []  See flow sheet :         min Neuromuscular Re-education:  []  See flow sheet :       8 min Manual Therapy:  DTM right ITB using flexbar and \"the stick\"   Rationale: decrease pain, increase ROM, increase tissue extensibility and decrease trigger points to improve gait and function     min Gait Training:  ___ feet with ___ device on level surfaces with ___ level of assist   Rationale: With   [] TE   [] TA   [] neuro   [] other: Patient Education: [x] Review HEP    [] Progressed/Changed HEP based on:   [] positioning   [] body mechanics   [] transfers   [] heat/ice application    [x] other: issued tennis ball for self massage of right ITB     Other Objective/Functional Measures:      Pain Level (0-10 scale) post treatment: 2/10    ASSESSMENT/Changes in Function: Pt ambulating with RW and challenged with standing therex sec to right LE. Patient will continue to benefit from skilled PT services to modify and progress therapeutic interventions, address functional mobility deficits, address ROM deficits, address strength deficits, analyze and address soft tissue restrictions, analyze and cue movement patterns, analyze and modify body mechanics/ergonomics, assess and modify postural abnormalities, address imbalance/dizziness and instruct in home and community integration to attain remaining goals. []  See Plan of Care  [x]  See progress note/recertification  []  See Discharge Summary         Progress towards goals / Updated goals:  Short Term Goals: To be accomplished in 6 treatments:  1)The pt will report no greater than 6/10 at worst to improve his tolerance to walking.   Status at eval: 10/10 at worst  Status at :8/10  Current Status: slowly progressin/10 at worst      Long Term Goals: To be accomplished in 12 treatments:  1) Seated right hamstring strength will increase to 5/5 without pain to improve his tolerance to climbing stairs. Status at eval: 4-/5 with slight pain  Status at [facility]: no change  Current Status: not met: 4/5 at best with slight pain      2) Right hip abduction strength will increase to 4/5 to provide pelvic stability for standing. Status at eval: 3+/5 with pain  Status at :no change  Current Status: no change      3) Right knee ext strength will increase to 5/5 to improve his ability to move from sit to stand.   Status at eval:4/5  Status at :not met:4/5  Current Status: no change    PLAN  [x]  Upgrade activities as tolerated     [x]  Continue plan of care  []  Update interventions per flow sheet       []  Discharge due to:_  []  Other:_      Lilly Suarez, PTA 1/31/2017  3:06 PM    Future Appointments  Date Time Provider Flaquita Gamez   2/1/2017 2:30 PM [facility]   2/3/2017 2:30 PM EULOGIO JuradoHPANDREEA [facility]   2/6/2017 2:30 PM EULOGIO Ellis [facility]   2/9/2017 10:30 AM [facility]   2/10/2017 10:30 AM EULOGIO EllisHPANDREEA [facility]

## 2017-02-01 ENCOUNTER — HOSPITAL ENCOUNTER (OUTPATIENT)
Dept: PHYSICAL THERAPY | Age: 82
Discharge: HOME | End: 2017-02-01
Payer: MEDICARE

## 2017-02-01 PROCEDURE — 97110 THERAPEUTIC EXERCISES: CPT

## 2017-02-01 NOTE — PROGRESS NOTES
PT DAILY TREATMENT NOTE - [facility]     Patient Name: Lashell Gold  Date:2017  : 1933  [x]  Patient  Verified  Payor: [place] MEDICARE / Plan: [place] MEDICARE PART A & B / Product Type: Medicare /    In time:2:30  Out time:3:24  Total Treatment Time (min): 54  Total Timed Codes (min): 44  1:1 Treatment Time ( [address] only): 30   Visit #: 13 of 22    Treatment Area: Leg pain, right [M79.604]    SUBJECTIVE  Pain Level (0-10 scale): 10  Any medication changes, allergies to medications, adverse drug reactions, diagnosis change, or new procedure performed?: [x] No    [] Yes (see summary sheet for update)  Subjective functional status/changes:   [x] No changes reported      OBJECTIVE    Modality rationale: decrease inflammation and decrease pain to improve the patients ability to walk   Min Type Additional Details    [] Estim:  []Unatt       []IFC  []Premod                        []Other:  []w/ice   []w/heat  Position:  Location:    [] Estim: []Att    []TENS instruct  []NMES                    []Other:  []w/US   []w/ice   []w/heat  Position:  Location:    []  Traction: [] Cervical       []Lumbar                       [] Prone          []Supine                       []Intermittent   []Continuous Lbs:  [] before manual  [] after manual    []  Ultrasound: []Continuous   [] Pulsed                           []1MHz   []3MHz W/cm2:  Location:    []  Iontophoresis with dexamethasone         Location: [] Take home patch   [] In clinic   10 [x]  Ice     []  heat  []  Ice massage  []  Laser   []  Anodyne Position: supine  Location: low back    []  Laser with stim  []  Other:  Position:  Location:    []  Vasopneumatic Device Pressure:       [] lo [] med [] hi   Temperature: [] lo [] med [] hi   [] Skin assessment post-treatment:  []intact []redness- no adverse reaction    []redness  adverse reaction:      min []Eval                  []Re-Eval       44 min Therapeutic Exercise:  [x] See flow sheet :   Rationale: increase ROM and increase strength to improve the patients ability to walk     min Therapeutic Activity:  []  See flow sheet :   Rationale:       min Neuromuscular Re-education:  []  See flow sheet :   Rationale:      min Manual Therapy:     Rationale:      min Gait Training:  ___ feet with ___ device on level surfaces with ___ level of assist   Rationale: With   [] TE   [] TA   [] neuro   [] other: Patient Education: [x] Review HEP    [] Progressed/Changed HEP based on:   [] positioning   [] body mechanics   [] transfers   [] heat/ice application    [] other:      Other Objective/Functional Measures:      Pain Level (0-10 scale) post treatment: 3/10    ASSESSMENT/Changes in Function: The pt still has difficulty with right LE weight bearing but his condition is not helped by right knee hyperextension when fully weight bearing through the right LE increasing gait deviations and imbalance. He needs to continue with strengthening to improve his control with movements. Patient will continue to benefit from skilled PT services to modify and progress therapeutic interventions, address functional mobility deficits, address ROM deficits, address strength deficits, analyze and address soft tissue restrictions, analyze and cue movement patterns, analyze and modify body mechanics/ergonomics, assess and modify postural abnormalities, address imbalance/dizziness and instruct in home and community integration to attain remaining goals.      [x]  See Plan of Care  []  See progress note/recertification  []  See Discharge Summary           PLAN  [x]  Upgrade activities as tolerated     [x]  Continue plan of care  []  Update interventions per flow sheet       []  Discharge due to:_  []  Other:_      Genetta Epifanio, PT 2/1/2017  4:19 PM    Future Appointments  Date Time Provider Flaquita Gamez   2/3/2017 2:30 PM Mar[facility]   2/6/2017 2:30 PM Wolfgang Morris, PT [facility]   2/9/2017 10:30 AM Clint Santana PT [facility]   2/10/2017 10:30 AM Romelia Scott PT [facility] THE [facility]

## 2017-02-03 ENCOUNTER — HOSPITAL ENCOUNTER (OUTPATIENT)
Dept: PHYSICAL THERAPY | Age: 82
Discharge: HOME | End: 2017-02-03
Payer: MEDICARE

## 2017-02-03 PROCEDURE — 97140 MANUAL THERAPY 1/> REGIONS: CPT

## 2017-02-03 PROCEDURE — 97110 THERAPEUTIC EXERCISES: CPT

## 2017-02-03 NOTE — PROGRESS NOTES
PT DAILY TREATMENT NOTE - [facility]     Patient Name: Michelle Kevin  Date:2/3/2017  : 1933  [x]  Patient  Verified  Payor: VA MEDICARE / Plan: VA MEDICARE PART A & B / Product Type: Medicare /    In time:2:23  Out time:3:25  Total Treatment Time (min): 62  Total Timed Codes (min): 52  1:1 Treatment Time ( [address] only): 52   Visit #: 14 of 22    Treatment Area: Leg pain, right [M79.604]    SUBJECTIVE  Pain Level (0-10 scale): 5/10  Any medication changes, allergies to medications, adverse drug reactions, diagnosis change, or new procedure performed?: [x] No    [] Yes (see summary sheet for update)  Subjective functional status/changes:   [x] No changes reported      OBJECTIVE    Modality rationale: decrease pain to improve the patients ability to walk   Min Type Additional Details    [] Estim:  []Unatt       []IFC  []Premod                        []Other:  []w/ice   []w/heat  Position:  Location:    [] Estim: []Att    []TENS instruct  []NMES                    []Other:  []w/US   []w/ice   []w/heat  Position:  Location:    []  Traction: [] Cervical       []Lumbar                       [] Prone          []Supine                       []Intermittent   []Continuous Lbs:  [] before manual  [] after manual    []  Ultrasound: []Continuous   [] Pulsed                           []1MHz   []3MHz W/cm2:  Location:    []  Iontophoresis with dexamethasone         Location: [] Take home patch   [] In clinic   10 [x]  Ice     []  heat  []  Ice massage  []  Laser   []  Anodyne Position: supine  Location: low back    []  Laser with stim  []  Other:  Position:  Location:    []  Vasopneumatic Device Pressure:       [] lo [] med [] hi   Temperature: [] lo [] med [] hi   [] Skin assessment post-treatment:  []intact []redness- no adverse reaction    []redness  adverse reaction:      min []Eval                  []Re-Eval       44 min Therapeutic Exercise:  [x] See flow sheet :   Rationale: increase ROM and increase strength to improve the patients ability to walk     min Therapeutic Activity:  []  See flow sheet :   Rationale:       min Neuromuscular Re-education:  []  See flow sheet :   Rationale:     8 min Manual Therapy:  Rolling to right IT band and hamstrings   Rationale: decrease pain, increase ROM and increase tissue extensibility to improve tolerance to standing and walking     min Gait Training:  ___ feet with ___ device on level surfaces with ___ level of assist   Rationale: With   [] TE   [] TA   [] neuro   [] other: Patient Education: [x] Review HEP    [] Progressed/Changed HEP based on:   [] positioning   [] body mechanics   [] transfers   [] heat/ice application    [] other:      Other Objective/Functional Measures: see goal review     Pain Level (0-10 scale) post treatment: 3/10    ASSESSMENT/Changes in Function: The pt had better LE strength but still lacks good hip control which affects his gait which puts more stress on his hamstrings contributing to his pain. He should continue with strength and stability exercises. Patient will continue to benefit from skilled PT services to modify and progress therapeutic interventions, address functional mobility deficits, address strength deficits, analyze and address soft tissue restrictions, analyze and cue movement patterns, analyze and modify body mechanics/ergonomics, assess and modify postural abnormalities, address imbalance/dizziness and instruct in home and community integration to attain remaining goals. [x]  See Plan of Care  []  See progress note/recertification  []  See Discharge Summary         Progress towards goals / Updated goals:  Short Term Goals: To be accomplished in 6 treatments:  1)The pt will report no greater than 6/10 at worst to improve his tolerance to walking. Status at eval: 10/10 at worst  Status at :8/10  Current Status: slowly progressin/10 at worst      Long Term Goals:  To be accomplished in 12 treatments:  1) Seated right hamstring strength will increase to 5/5 without pain to improve his tolerance to climbing stairs. Status at eval: 4-/5 with slight pain  Status at [facility]: no change  Current Status: Progressing, 4/5      2) Right hip abduction strength will increase to 4/5 to provide pelvic stability for standing. Status at eval: 3+/5 with pain  Status at :no change  Current Status: Met, 4+/5 (2/3/2017)      3) Right knee ext strength will increase to 5/5 to improve his ability to move from sit to stand.   Status at eval:4/5  Status at :not met:4/5  Current Status: Progressing, 4+/5    PLAN  [x]  Upgrade activities as tolerated     [x]  Continue plan of care  []  Update interventions per flow sheet       []  Discharge due to:_  []  Other:_      Keke Douglas PT 2/3/2017  2:34 PM    Future Appointments  Date Time Provider Flaquita Gamez   2/6/2017 2:30 PM EULOGIO Mack [facility]   2/9/2017 10:30 AM EULOGIO Mack [facility]   2/10/2017 10:30 AM EULOGIO Mack [facility]

## 2017-02-06 ENCOUNTER — HOSPITAL ENCOUNTER (OUTPATIENT)
Dept: PHYSICAL THERAPY | Age: 82
Discharge: HOME | End: 2017-02-06
Payer: MEDICARE

## 2017-02-06 PROCEDURE — 97110 THERAPEUTIC EXERCISES: CPT

## 2017-02-06 PROCEDURE — 97140 MANUAL THERAPY 1/> REGIONS: CPT

## 2017-02-06 NOTE — PROGRESS NOTES
PT DAILY TREATMENT NOTE - [facility]     Patient Name: Michelle Kevin  Date:2017  : 1933  [x]  Patient  Verified  Payor: [place] MEDICARE / Plan: [place] MEDICARE PART A & B / Product Type: Medicare /    In time:229  Out time:317  Total Treatment Time (min): 48  Total Timed Codes (min): 38  1:1 Treatment Time ( only): 38   Visit #: 15 of 22    Treatment Area: Leg pain, right [M79.604]    SUBJECTIVE  Pain Level (0-10 scale): 5/10  Any medication changes, allergies to medications, adverse drug reactions, diagnosis change, or new procedure performed?: [x] No    [] Yes (see summary sheet for update)  Subjective functional status/changes:   [] No changes reported  Noticing a little change on my left side after the ablation procedure was done last week.     OBJECTIVE    Modality rationale: decrease inflammation and decrease pain to improve the patients ability to increase activity/position tolerance   Min Type Additional Details    [] Estim:  []Unatt       []IFC  []Premod                        []Other:  []w/ice   []w/heat  Position:  Location:    [] Estim: []Att    []TENS instruct  []NMES                    []Other:  []w/US   []w/ice   []w/heat  Position:  Location:    []  Traction: [] Cervical       []Lumbar                       [] Prone          []Supine                       []Intermittent   []Continuous Lbs:  [] before manual  [] after manual    []  Ultrasound: []Continuous   [] Pulsed                           []1MHz   []3MHz W/cm2:  Location:    []  Iontophoresis with dexamethasone         Location: [] Take home patch   [] In clinic   10 [x]  Ice     []  heat  []  Ice massage  []  Laser   []  Anodyne Position: H/L supine  Location: right buttock/HS origin    []  Laser with stim  []  Other:  Position:  Location:    []  Vasopneumatic Device Pressure:       [] lo [] med [] hi   Temperature: [] lo [] med [] hi   [] Skin assessment post-treatment:  []intact []redness- no adverse reaction    []redness  adverse reaction:      min []Eval                  []Re-Eval       30 min Therapeutic Exercise:  [x] See flow sheet :   Rationale: increase ROM, increase strength, improve balance and increase proprioception to improve the patients ability to normalize gait and function     min Therapeutic Activity:  []  See flow sheet :        min Neuromuscular Re-education:  []  See flow sheet :       8 min Manual Therapy:  Rolling to right IT band and hamstrings in left S/L   Rationale: decrease pain, increase tissue extensibility and decrease trigger points to improve tolerance for standing and walking     min Gait Training:  ___ feet with ___ device on level surfaces with ___ level of assist   Rationale: With   [] TE   [] TA   [] neuro   [] other: Patient Education: [x] Review HEP    [] Progressed/Changed HEP based on:   [] positioning   [] body mechanics   [] transfers   [] heat/ice application    [] other:      Other Objective/Functional Measures:   FOTO score:50/100    Pain Level (0-10 scale) post treatment: 2/10    ASSESSMENT/Changes in Function: No significant improvement in FOTO score from [facility]. Patient will continue to benefit from skilled PT services to modify and progress therapeutic interventions, address functional mobility deficits, address ROM deficits, address strength deficits, analyze and address soft tissue restrictions, analyze and cue movement patterns, analyze and modify body mechanics/ergonomics and assess and modify postural abnormalities to attain remaining goals.      []  See Plan of Care  [x]  See progress note/recertification  []  See Discharge Summary             PLAN  [x]  Upgrade activities as tolerated     [x]  Continue plan of care  []  Update interventions per flow sheet       []  Discharge due to:_  []  Other:_      Dee Mariee PT 2/6/2017  2:24 PM    Future Appointments  Date Time Provider Flaquiat Gamez   2/6/2017 2:30 PM Dee Mariee, PT [facility]   2/9/2017 10:30 AM Dee Mariee PT [facility] THE [facility]   2/10/2017 10:30 AM Eddie Doherty PT [facility] THE [facility]

## 2017-02-09 ENCOUNTER — HOSPITAL ENCOUNTER (OUTPATIENT)
Dept: PHYSICAL THERAPY | Age: 82
Discharge: HOME | End: 2017-02-09
Payer: MEDICARE

## 2017-02-09 PROCEDURE — 97110 THERAPEUTIC EXERCISES: CPT

## 2017-02-09 NOTE — PROGRESS NOTES
PT DAILY TREATMENT NOTE - [facility]     Patient Name: Glynn Hinojosa  Date:2017  : 1933  [x]  Patient  Verified  Payor: [place] MEDICARE / Plan: [place] MEDICARE PART A & B / Product Type: Medicare /    In time:1027  Out time:1115  Total Treatment Time (min): 48  Total Timed Codes (min): 38  1:1 Treatment Time ([facility] only): 38   Visit #: 16 of 22    Treatment Area: Leg pain, right [M79.604]    SUBJECTIVE  Pain Level (0-10 scale): 10  Any medication changes, allergies to medications, adverse drug reactions, diagnosis change, or new procedure performed?: [x] No    [] Yes (see summary sheet for update)  Subjective functional status/changes:   [x] No changes reported  I see the orthopedic in another 2 weeks.     OBJECTIVE    Modality rationale: decrease inflammation and decrease pain to improve the patients ability to increase activity/position tolerance   Min Type Additional Details    [] Estim:  []Unatt       []IFC  []Premod                        []Other:  []w/ice   []w/heat  Position:  Location:    [] Estim: []Att    []TENS instruct  []NMES                    []Other:  []w/US   []w/ice   []w/heat  Position:  Location:    []  Traction: [] Cervical       []Lumbar                       [] Prone          []Supine                       []Intermittent   []Continuous Lbs:  [] before manual  [] after manual    []  Ultrasound: []Continuous   [] Pulsed                           []1MHz   []3MHz W/cm2:  Location:    []  Iontophoresis with dexamethasone         Location: [] Take home patch   [] In clinic   10 [x]  Ice     []  heat  []  Ice massage  []  Laser   []  Anodyne Position: H/L supine  Location:right buttock/HS origin    []  Laser with stim  []  Other:  Position:  Location:    []  Vasopneumatic Device Pressure:       [] lo [] med [] hi   Temperature: [] lo [] med [] hi   [] Skin assessment post-treatment:  []intact []redness- no adverse reaction    []redness  adverse reaction:      min []Eval []Re-Eval       38 min Therapeutic Exercise:  [x] See flow sheet :   Rationale: increase ROM, increase strength, improve balance and increase proprioception to improve the patients ability to normalize gait and function     min Therapeutic Activity:  []  See flow sheet :         min Neuromuscular Re-education:  []  See flow sheet :        min Manual Therapy:          min Gait Training:  ___ feet with ___ device on level surfaces with ___ level of assist   Rationale: With   [] TE   [] TA   [] neuro   [] other: Patient Education: [x] Review HEP    [] Progressed/Changed HEP based on:   [] positioning   [] body mechanics   [] transfers   [] heat/ice application    [] other:      Other Objective/Functional Measures: see goal review     Pain Level (0-10 scale) post treatment: 3/10    ASSESSMENT/Changes in Function: Pain has appeared to have plateaued at a -. Pt shoulder continue PT for next 2 weeks until F/U with MD to continue to work on mobility and strength. Patient will continue to benefit from skilled PT services to modify and progress therapeutic interventions, address functional mobility deficits, address ROM deficits, address strength deficits, analyze and address soft tissue restrictions, analyze and cue movement patterns, assess and modify postural abnormalities and address imbalance/dizziness to attain remaining goals. []  See Plan of Care  []  See progress note/recertification  []  See Discharge Summary         Progress towards goals / Updated goals:  Short Term Goals: To be accomplished in 6 treatments:  1)The pt will report no greater than 6/10 at worst to improve his tolerance to walking. Status at eval: 10/10 at worst  Status at :8/10  Current Status: slowly progressin/10 at worst      Long Term Goals: To be accomplished in 12 treatments:  1) Seated right hamstring strength will increase to 5/5 without pain to improve his tolerance to climbing stairs.   Status at eval: 4-/5 with slight pain  Status at [facility]: no change  Current Status: not met: 4/5      2) Right hip abduction strength will increase to 4/5 to provide pelvic stability for standing. Status at [facility]: 3+/5 with pain  Status at :no change  Current Status: Met, 4+/5 (2/3/2017)      3) Right knee ext strength will increase to 5/5 to improve his ability to move from sit to stand.   Status at [facility]:4/5  Status at :not met:4/5  Current Status: not met: 4/5       PLAN  [x]  Upgrade activities as tolerated     [x]  Continue plan of care  []  Update interventions per flow sheet       []  Discharge due to:_  []  Other:_      Blanka Womack PT 2/9/2017  10:22 AM    Future Appointments  Date Time Provider Flaquita Gamez   2/9/2017 10:30 AM EULOGIO Jerome THE [facility]   2/10/2017 10:30 AM EULOGIO Jerome THE [facility]

## 2017-02-10 ENCOUNTER — HOSPITAL ENCOUNTER (OUTPATIENT)
Dept: PHYSICAL THERAPY | Age: 82
Discharge: HOME | End: 2017-02-10
Payer: MEDICARE

## 2017-02-10 PROCEDURE — 97110 THERAPEUTIC EXERCISES: CPT

## 2017-02-10 NOTE — PROGRESS NOTES
PT DAILY TREATMENT NOTE - [facility]     Patient Name: Ajit Davis  Date:2/10/2017  : 1933  [x]  Patient  Verified  Payor: VA MEDICARE / Plan: VA MEDICARE PART A & B / Product Type: Medicare /    In time:1025  Out time:1113  Total Treatment Time (min): 48  Total Timed Codes (min): 38  1:1 Treatment Time ( [address] only): 38   Visit #: 17 of 22    Treatment Area: Leg pain, right [M79.604]    SUBJECTIVE  Pain Level (0-10 scale): 6/10  Any medication changes, allergies to medications, adverse drug reactions, diagnosis change, or new procedure performed?: [x] No    [] Yes (see summary sheet for update)  Subjective functional status/changes:   [x] No changes reported      OBJECTIVE    Modality rationale: decrease inflammation and decrease pain to improve the patients ability to increase activity/position tolerance   Min Type Additional Details    [] Estim:  []Unatt       []IFC  []Premod                        []Other:  []w/ice   []w/heat  Position:  Location:    [] Estim: []Att    []TENS instruct  []NMES                    []Other:  []w/US   []w/ice   []w/heat  Position:  Location:    []  Traction: [] Cervical       []Lumbar                       [] Prone          []Supine                       []Intermittent   []Continuous Lbs:  [] before manual  [] after manual    []  Ultrasound: []Continuous   [] Pulsed                           []1MHz   []3MHz W/cm2:  Location:    []  Iontophoresis with dexamethasone         Location: [] Take home patch   [] In clinic   10 [x]  Ice     []  heat  []  Ice massage  []  Laser   []  Anodyne Position: H/L supine  Location: right buttock/HS origin    []  Laser with stim  []  Other:  Position:  Location:    []  Vasopneumatic Device Pressure:       [] lo [] med [] hi   Temperature: [] lo [] med [] hi   [] Skin assessment post-treatment:  []intact []redness- no adverse reaction    []redness  adverse reaction:      min []Eval                  []Re-Eval       38 min Therapeutic Exercise: [x] See flow sheet :   Rationale: increase ROM, increase strength, improve balance and increase proprioception to improve the patients ability to normalize gait and function     min Therapeutic Activity:  []  See flow sheet :         min Neuromuscular Re-education:  []  See flow sheet :        min Manual Therapy:         min Gait Training:  ___ feet with ___ device on level surfaces with ___ level of assist   Rationale: With   [] TE   [] TA   [] neuro   [] other: Patient Education: [x] Review HEP    [] Progressed/Changed HEP based on:   [] positioning   [] body mechanics   [] transfers   [] heat/ice application    [] other:      Other Objective/Functional Measures:    TUG score: 25 sec with RW    Pain Level (0-10 scale) post treatment: 3/10    ASSESSMENT/Changes in Function: Moderate pain levels persist. Pt receives short-term relief from ice application at end of session. Patient will continue to benefit from skilled PT services to modify and progress therapeutic interventions, address functional mobility deficits, address ROM deficits, address strength deficits, analyze and address soft tissue restrictions, analyze and cue movement patterns, analyze and modify body mechanics/ergonomics, assess and modify postural abnormalities and address imbalance/dizziness to attain remaining goals.      []  See Plan of Care  [x]  See progress note/recertification  []  See Discharge Summary           PLAN  [x]  Upgrade activities as tolerated     [x]  Continue plan of care  []  Update interventions per flow sheet       []  Discharge due to:_  []  Other:_      Andra Angelucci, PT 2/10/2017  10:17 AM    Future Appointments  Date Time Provider Flaquita Gamez   2/10/2017 10:30 AM Andra Angelucci, [place] THE [facility]   2/14/2017 2:00 PM Elorline Copaminata, PTA [facility]   2/16/2017 2:00 PM Elorline Copa, PTA [facility]   2/20/2017 2:30 PM Andra Angelucci, PT [facility]   2/21/2017 2:00 PM Elorline Copaminata, PTA [facility]

## 2017-02-14 ENCOUNTER — HOSPITAL ENCOUNTER (OUTPATIENT)
Dept: PHYSICAL THERAPY | Age: 82
Discharge: HOME | End: 2017-02-14
Payer: MEDICARE

## 2017-02-14 PROCEDURE — 97110 THERAPEUTIC EXERCISES: CPT

## 2017-02-14 NOTE — PROGRESS NOTES
PT DAILY TREATMENT NOTE - [facility]     Patient Name: Judi Thapa  Date:2017  : 1933  [x]  Patient  Verified  Payor: Raegan Molina / Plan: [place] MEDICARE PART A & B / Product Type: Medicare /    In time:1400  Out time:1455  Total Treatment Time (min): 55  Total Timed Codes (min): 45  1:1 Treatment Time ( [address] only): 38   Visit #: 18 of 22    Treatment Area: Leg pain, right [M79.604]    SUBJECTIVE  Pain Level (0-10 scale): 610  Any medication changes, allergies to medications, adverse drug reactions, diagnosis change, or new procedure performed?: [x] No    [] Yes (see summary sheet for update)  Subjective functional status/changes:   [] No changes reported  I have no pain in the left leg but no change in the right leg following the ablation.     OBJECTIVE    Modality rationale: decrease inflammation and decrease pain to improve the patients ability to improve gait and ADL's   Min Type Additional Details    [] Estim:  []Unatt       []IFC  []Premod                        []Other:  []w/ice   []w/heat  Position:  Location:    [] Estim: []Att    []TENS instruct  []NMES                    []Other:  []w/US   []w/ice   []w/heat  Position:  Location:    []  Traction: [] Cervical       []Lumbar                       [] Prone          []Supine                       []Intermittent   []Continuous Lbs:  [] before manual  [] after manual    []  Ultrasound: []Continuous   [] Pulsed                           []1MHz   []3MHz W/cm2:  Location:    []  Iontophoresis with dexamethasone         Location: [] Take home patch   [] In clinic   10 [x]  Ice     []  heat  []  Ice massage  []  Laser   []  Anodyne Position:HL  Location:lumbopelvic region    []  Laser with stim  []  Other:  Position:  Location:    []  Vasopneumatic Device Pressure:       [] lo [] med [] hi   Temperature: [] lo [] med [] hi   [x] Skin assessment post-treatment:  [x]intact []redness- no adverse reaction    []redness  adverse reaction:      min []Eval []Re-Eval       45 (38) min Therapeutic Exercise:  [x] See flow sheet :   Rationale: increase ROM, increase strength, improve coordination, improve balance and increase proprioception to improve the patients ability to improve gait and ADL's     min Therapeutic Activity:  []  See flow sheet :         min Neuromuscular Re-education:  []  See flow sheet :        min Manual Therapy:          min Gait Training:  ___ feet with ___ device on level surfaces with ___ level of assist   Rationale: With   [] TE   [] TA   [] neuro   [] other: Patient Education: [x] Review HEP    [] Progressed/Changed HEP based on:   [] positioning   [] body mechanics   [] transfers   [] heat/ice application    [] other:      Other Objective/Functional Measures:      Pain Level (0-10 scale) post treatment: -3/10    ASSESSMENT/Changes in Function: Pt ambulating using RW reporting abolished pain on left LE and improved pain on right LE at end of tx. Pt does report increased right LE pain with standing and gait. Patient will continue to benefit from skilled PT services to modify and progress therapeutic interventions, address functional mobility deficits, address ROM deficits, address strength deficits, analyze and address soft tissue restrictions, analyze and cue movement patterns, analyze and modify body mechanics/ergonomics, assess and modify postural abnormalities, address imbalance/dizziness and instruct in home and community integration to attain remaining goals. []  See Plan of Care  [x]  See progress note/recertification  []  See Discharge Summary         Progress towards goals / Updated goals:  Short Term Goals: To be accomplished in 6 treatments:  1)The pt will report no greater than 6/10 at worst to improve his tolerance to walking. Status at eval: 10/10 at worst  Status at :8/10  Current Status: slowly progressin/10 at worst      Long Term Goals:  To be accomplished in 12 treatments:  1) Seated right hamstring strength will increase to 5/5 without pain to improve his tolerance to climbing stairs. Status at eval: 4-/5 with slight pain  Status at [facility]: no change  Current Status: not met: 4/5      2) Right hip abduction strength will increase to 4/5 to provide pelvic stability for standing. Status at eval: 3+/5 with pain  Status at :no change  Current Status: Met, 4+/5       3) Right knee ext strength will increase to 5/5 to improve his ability to move from sit to stand. Status at eval:4/5  Status at :not met:4/5  Current Status: not met: 4/5       PLAN  []  Upgrade activities as tolerated     [x]  Continue plan of care  []  Update interventions per flow sheet       []  Discharge due to:_  [x]  Other:Prep for discharge by end of next week.       Nina Fontanez PTA 2/14/2017  2:16 PM    Future Appointments  Date Time Provider Flaquita Gamez   2/16/2017 2:00 PM Jamal Trevino [facility]   2/20/2017 2:30 PM EULOGIO KellyHPANDREEA THE [facility]   2/21/2017 2:00 PM ALFRED Trevino THE [facility]

## 2017-02-16 ENCOUNTER — HOSPITAL ENCOUNTER (OUTPATIENT)
Dept: PHYSICAL THERAPY | Age: 82
Discharge: HOME | End: 2017-02-16
Payer: MEDICARE

## 2017-02-16 PROCEDURE — 97110 THERAPEUTIC EXERCISES: CPT

## 2017-02-20 ENCOUNTER — HOSPITAL ENCOUNTER (OUTPATIENT)
Dept: PHYSICAL THERAPY | Age: 82
Discharge: HOME | End: 2017-02-20
Payer: MEDICARE

## 2017-02-20 PROCEDURE — 97110 THERAPEUTIC EXERCISES: CPT

## 2017-02-20 NOTE — PROGRESS NOTES
PT DAILY TREATMENT NOTE - [facility]     Patient Name: Nicole Romano  Date:2017  : 1933  [x]  Patient  Verified  Payor: Sona Castillo / Plan: [place] MEDICARE PART A & B / Product Type: Medicare /    In time:1430  Out time:1515  Total Treatment Time (min): 45  Total Timed Codes (min): 35  1:1 Treatment Time ( only): 35   Visit #: 20 of 22    Treatment Area: Leg pain, right [M79.604]    SUBJECTIVE  Pain Level (0-10 scale): 5/10  Any medication changes, allergies to medications, adverse drug reactions, diagnosis change, or new procedure performed?: [x] No    [] Yes (see summary sheet for update)  Subjective functional status/changes:   [] No changes reported  My pain is a little less today.     OBJECTIVE    Modality rationale: decrease inflammation and decrease pain to improve the patients ability to improve ADL's   Min Type Additional Details    [] Estim:  []Unatt       []IFC  []Premod                        []Other:  []w/ice   []w/heat  Position:  Location:    [] Estim: []Att    []TENS instruct  []NMES                    []Other:  []w/US   []w/ice   []w/heat  Position:  Location:    []  Traction: [] Cervical       []Lumbar                       [] Prone          []Supine                       []Intermittent   []Continuous Lbs:  [] before manual  [] after manual    []  Ultrasound: []Continuous   [] Pulsed                           []1MHz   []3MHz W/cm2:  Location:    []  Iontophoresis with dexamethasone         Location: [] Take home patch   [] In clinic   10 [x]  Ice     []  heat  []  Ice massage  []  Laser   []  Anodyne Position:HL  Location:L/S    []  Laser with stim  []  Other:  Position:  Location:    []  Vasopneumatic Device Pressure:       [] lo [] med [] hi   Temperature: [] lo [] med [] hi   [x] Skin assessment post-treatment:  [x]intact []redness- no adverse reaction    []redness  adverse reaction:      min []Eval                  []Re-Eval       35 min Therapeutic Exercise:  [x] See flow sheet : Rationale: increase ROM, increase strength and improve coordination to improve the patients ability to improve gait and function     min Therapeutic Activity:  []  See flow sheet :         min Neuromuscular Re-education:  []  See flow sheet :        min Manual Therapy:          min Gait Training:  ___ feet with ___ device on level surfaces with ___ level of assist   Rationale: With   [] TE   [] TA   [] neuro   [] other: Patient Education: [x] Review HEP    [] Progressed/Changed HEP based on:   [] positioning   [] body mechanics   [] transfers   [] heat/ice application    [] other:      Other Objective/Functional Measures: FOTO:47     Pain Level (0-10 scale) post treatment: 3/10    ASSESSMENT/Changes in Function: Pt was able to clear obstacles stepping over using a RW today with improved hip flexion. Patient will continue to benefit from skilled PT services to modify and progress therapeutic interventions, address functional mobility deficits, address ROM deficits, address strength deficits, analyze and address soft tissue restrictions, analyze and cue movement patterns, analyze and modify body mechanics/ergonomics, assess and modify postural abnormalities, address imbalance/dizziness and instruct in home and community integration to attain remaining goals. []  See Plan of Care  [x]  See progress note/recertification  []  See Discharge Summary         PLAN  []  Upgrade activities as tolerated     [x]  Continue plan of care  []  Update interventions per flow sheet       []  Discharge due to:_  [x]  Other:_ Prep for discharge in 2 visits.      Lo David PTA 2/20/2017  2:15 PM    Future Appointments  Date Time Provider Flaquita Gamez   2/20/2017 2:30 PM Jamal Rodríguez [place] [facility]   2/21/2017 2:00 PM ALFRED Rodríguez THE [facility]

## 2017-02-21 ENCOUNTER — HOSPITAL ENCOUNTER (OUTPATIENT)
Dept: PHYSICAL THERAPY | Age: 82
Discharge: HOME | End: 2017-02-21
Payer: MEDICARE

## 2017-02-21 PROCEDURE — 97110 THERAPEUTIC EXERCISES: CPT

## 2017-02-21 PROCEDURE — G8980 MOBILITY D/C STATUS: HCPCS

## 2017-02-21 PROCEDURE — G8979 MOBILITY GOAL STATUS: HCPCS

## 2017-02-21 NOTE — PROGRESS NOTES
PT DAILY TREATMENT NOTE - [facility]     Patient Name: Yg Salazar  Date:2017  : 1933  [x]  Patient  Verified  Payor: Carmen Honey / Plan: [place] MEDICARE PART A & B / Product Type: Medicare /    In time:1400  Out time:1444  Total Treatment Time (min): 44  Total Timed Codes (min): 34  1:1 Treatment Time ( [address] only): 34   Visit #: 21 of 22    Treatment Area: Leg pain, right [M79.604]    SUBJECTIVE  Pain Level (0-10 scale): 6/10  Any medication changes, allergies to medications, adverse drug reactions, diagnosis change, or new procedure performed?: [x] No    [] Yes (see summary sheet for update)  Subjective functional status/changes:   [] No changes reported  I see my doctor on Friday.     OBJECTIVE    Modality rationale: decrease inflammation and decrease pain to improve the patients ability to improve positioning and ADL's   Min Type Additional Details    [] Estim:  []Unatt       []IFC  []Premod                        []Other:  []w/ice   []w/heat  Position:  Location:    [] Estim: []Att    []TENS instruct  []NMES                    []Other:  []w/US   []w/ice   []w/heat  Position:  Location:    []  Traction: [] Cervical       []Lumbar                       [] Prone          []Supine                       []Intermittent   []Continuous Lbs:  [] before manual  [] after manual    []  Ultrasound: []Continuous   [] Pulsed                           []1MHz   []3MHz W/cm2:  Location:    []  Iontophoresis with dexamethasone         Location: [] Take home patch   [] In clinic   10 [x]  Ice     []  heat  []  Ice massage  []  Laser   []  Anodyne Position:HL  Location:L/S    []  Laser with stim  []  Other:  Position:  Location:    []  Vasopneumatic Device Pressure:       [] lo [] med [] hi   Temperature: [] lo [] med [] hi   [] Skin assessment post-treatment:  []intact []redness- no adverse reaction    []redness  adverse reaction:      min []Eval                  []Re-Eval       34 min Therapeutic Exercise:  [x] See flow sheet : therex reviewed for discharge   Rationale: increase ROM, increase strength and improve coordination to improve the patients ability to promote HEP upon discharge     min Therapeutic Activity:  []  See flow sheet :         min Neuromuscular Re-education:  []  See flow sheet :        min Manual Therapy:          min Gait Training:  ___ feet with ___ device on level surfaces with ___ level of assist   Rationale: With   [] TE   [] TA   [] neuro   [] other: Patient Education: [x] Review HEP    [] Progressed/Changed HEP based on:   [] positioning   [] body mechanics   [] transfers   [x] heat/ice application    [] other:      Other Objective/Functional Measures: see goal review Therex reviewed for discharge     Pain Level (0-10 scale) post treatment: 3/10    ASSESSMENT/Changes in Function: Pt continues to be challenged with full WB on right LE and is ambulating using RW for balance and safety. []  See Plan of Care  []  See progress note/recertification  [x]  See Discharge Summary         Progress towards goals / Updated goals:  Short Term Goals: To be accomplished in 6 treatments:  1)The pt will report no greater than 6/10 at worst to improve his tolerance to walking. Status at [facility]: 10/10 at worst  Status at :8/10  Current Status: slowly progressin-8/10 at worst      Long Term Goals: To be accomplished in 12 treatments:  1) Seated right hamstring strength will increase to 5/5 without pain to improve his tolerance to climbing stairs. Status at [facility]: 4-/5 with slight pain  Status at [facility]: no change  Current Status: not met: 4/5      2) Right hip abduction strength will increase to 4/5 to provide pelvic stability for standing. Status at [facility]: 3+/5 with pain  Status at :no change  Current Status: Met, 4+/5       3) Right knee ext strength will increase to 5/5 to improve his ability to move from sit to stand.   Status at [facility]:4/5  Status at :not met:4/5  Current Status: not met: 4/5    PLAN  []  Upgrade activities as tolerated     []  Continue plan of care  []  Update interventions per flow sheet       [x]  Discharge due to:plateau in progress at this time._  []  Other:_      Rebekah Hathaway, PTA 2/21/2017  2:04 PM    No future appointments.

## 2017-02-23 NOTE — PROGRESS NOTES
[facility] at [address] [place] [address]  Phone: [phone]   Fax: [phone]    Discharge Summary    Patient name: Rajinder Nicole     Start of Care: 16  Referral source: Leela Mondragon MD    : 1933  Medical/Treatment Diagnosis: Leg pain, right [M79.604]  Onset Date:about one month ago  Prior Hospitalization: see medical history   Provider#: [id number]  Comorbidities: diabetes, arthritis, high blood pressure, hearing impaired, peripheral neuropathy, Right TKR, Right ankle replacement  Prior Level of Function:Pt used cane for ambulation due to neuropathy but had less pain with walking and standing  Medications: Verified on Patient Summary List    Visits from Start of Care: 21    Missed Visits: 1  Reporting Period : 17 to 17    Short Term Goals: To be accomplished in 6 treatments:  1)The pt will report no greater than 6/10 at worst to improve his tolerance to walking. Status at [facility]: 10/10 at worst  Status at :8/10  Current Status: slowly progressin-8/10 at worst      Long Term Goals: To be accomplished in 12 treatments:  1) Seated right hamstring strength will increase to 5/5 without pain to improve his tolerance to climbing stairs. Status at eval: 4-/5 with slight pain  Status at [facility]: no change  Current Status: not met: 4/5      2) Right hip abduction strength will increase to 4/5 to provide pelvic stability for standing. Status at eval: 3+/5 with pain  Status at :no change  Current Status: Met, 4+/5       3) Right knee ext strength will increase to 5/5 to improve his ability to move from sit to stand. Status at eval:4/5  Status at :not met:4/5  Current Status: not met: 4/5      G-Codes (GP)  Mobility    Goal  CK= 40-59%  D/C  CK= 40-59%    The severity rating is based on clinical judgment and the FOTO score. Assessment/ Summary of Care:  Moderate pain levels persist. Pt receives short-term relief from ice application at end of session. Pt continues to be challenged with full WB on right LE and is ambulating using RW for balance and safety. Treatment has included pelvic and LE strengthening exercises, mainly in seated and supine position, as pt has limited tolerance to standing activity/exercise.     RECOMMENDATIONS:  [x]Discontinue therapy: []Patient has reached or is progressing toward set goals      []Patient is non-compliant or has abdicated      [x]Due to lack of appreciable progress towards set goals    Elizabeth Delaney, PT 2/23/2017 9:23 AM

## 2017-03-06 ENCOUNTER — HOSPITAL ENCOUNTER (OUTPATIENT)
Dept: PHYSICAL THERAPY | Age: 82
Discharge: HOME | End: 2017-03-06
Payer: MEDICARE

## 2017-03-06 PROCEDURE — 97140 MANUAL THERAPY 1/> REGIONS: CPT

## 2017-03-06 PROCEDURE — G8979 MOBILITY GOAL STATUS: HCPCS

## 2017-03-06 PROCEDURE — G8978 MOBILITY CURRENT STATUS: HCPCS

## 2017-03-06 PROCEDURE — 97162 PT EVAL MOD COMPLEX 30 MIN: CPT

## 2017-03-06 NOTE — PROGRESS NOTES
[facility] at [address] [place] [address]  Phone: [phone]   Fax: [phone]    Plan of Care/ Statement of Necessity for Physical Therapy Services    Patient name: Dallas Scott Start of Care: 3/6/2017   Referral source: Joel Shin MD : 1933    Medical Diagnosis: Leg pain, right [M79.604]   Onset Date:end 2016    Treatment Diagnosis: right LE pain   Prior Hospitalization: see medical history Provider#: [id number]   Medications: Verified on Patient summary List    Comorbidities: diabetes, arthritis, high blood pressure, hearing impaired, peripheral neuropathy, Right TKR, Right ankle replacement   Prior Level of Function: ambulating with RW with less pain in right LE      The Plan of Care and following information is based on the information from the initial evaluation. Assessment/ key information: Pt well-known to this clinic now returning with similar complaints of right LE pain. Note pt was discharged from this clinic for right LE pain related to HS tendon rupture on 17. He now reports the pain radiates from right hip to right knee laterally (ITB region) that has progressively worsened in the past several weeks. Pain is still present in HS origin region, but not as intense as lateral thigh pain. On exam, pt has decreased LE strength, moderate HS tightness bilaterally, and moderate tenderness to palpation right ITB region. He ambulates with abnormal gait with RW secondary to pain. Evaluation Complexity History MEDIUM  Complexity : 1-2 comorbidities / personal factors will impact the outcome/ POC ; Examination MEDIUM Complexity : 3 Standardized tests and measures addressing body structure, function, activity limitation and / or participation in recreation  ;Presentation MEDIUM Complexity : Evolving with changing characteristics  ; Clinical Decision Making MEDIUM Complexity : FOTO score of 26-74  Overall Complexity Rating: MEDIUM  Problem List: pain affecting function, decrease ROM, decrease strength, impaired gait/ balance, decrease ADL/ functional abilitiies, decrease activity tolerance, decrease flexibility/ joint mobility and decrease transfer abilities   Treatment Plan may include any combination of the following: Therapeutic exercise, Therapeutic activities, Neuromuscular re-education, Physical agent/modality, Gait/balance training, Manual therapy, Aquatic therapy, Patient education and Functional mobility training  Patient / Family readiness to learn indicated by: asking questions, trying to perform skills and interest  Persons(s) to be included in education: patient (P)  Barriers to Learning/Limitations: None  Patient Goal (s): Jitendra Kubas relief  Patient Self Reported Health Status: fair  Rehabilitation Potential: fair    Short Term Goals: To be accomplished in 2 weeks:  1. Patient will be independent and compliant with HEP to achieve other goals. Status at eval: not independent/compliant  2. Pt will report </= 6/10 pain level at worst to increase activity/position tolerance. Status at eval: 8-9/10    Long Term Goals: To be accomplished in 4 weeks:  1. Improve FOTO score to >/= 53/100 to indicate decreased pain with ADL's. Status at eval: 47/100  2. Improve TUG score to </= 20 seconds to demo improved balance with gait. Status at eval: 30 seconds  3. Abolish tenderness to palpation to right ITB region to increase activity/position tolerance. Status at eval: moderate tenderness to palpation  4. Pt will report </= 4/10 pain level at worst to increase activity/position tolerance. Status at eval: 8-9/10    Frequency / Duration: Patient to be seen 2 times per week for 4 weeks.     Patient/ Caregiver education and instruction: Diagnosis, prognosis, other discussed POC   [x]  Plan of care has been reviewed with ALFRED    G-Codes (GP)  Mobility   Current  CK= 40-59%   Goal  CK= 40-59%    The severity rating is based on clinical judgment and the FOTO score. Certification Period: 3/6/17 - 4/5/17  Matias Joel, PT 3/6/2017 11:49 AM  _____________________________________________________________________  I certify that the above Therapy Services are being furnished while the patient is under my care. I agree with the treatment plan and certify that this therapy is necessary.     Physician's Signature:____________________  Date:__________Time:______    Please sign and return to   [facility] at [address] [place] [address]  Phone: [phone]   Fax: [phone]

## 2017-03-06 NOTE — PROGRESS NOTES
PT DAILY TREATMENT NOTE - [facility]     Patient Name: Brittanie Rodriguez  Date:3/6/2017  : 1933  [x]  Patient  Verified  Payor: [place] MEDICARE / Plan: [place] MEDICARE PART A & B / Product Type: Medicare /    In time:1110  Out time:1145  Total Treatment Time (min): 35  Total Timed Codes (min): 8  1:1 Treatment Time ( only): 35   Visit #: 1 of 8    Treatment Area: Leg pain, right [M79.604]    SUBJECTIVE  Pain Level (0-10 scale): 7/10 seated at rest Recent max: 8-9/10  Any medication changes, allergies to medications, adverse drug reactions, diagnosis change, or new procedure performed?: [x] No    [] Yes (see summary sheet for update)  Subjective functional status/changes:   [] No changes reported  See POC    OBJECTIVE    Modality rationale:    Min Type Additional Details    [] Estim:  []Unatt       []IFC  []Premod                        []Other:  []w/ice   []w/heat  Position:  Location:    [] Estim: []Att    []TENS instruct  []NMES                    []Other:  []w/US   []w/ice   []w/heat  Position:  Location:    []  Traction: [] Cervical       []Lumbar                       [] Prone          []Supine                       []Intermittent   []Continuous Lbs:  [] before manual  [] after manual    []  Ultrasound: []Continuous   [] Pulsed                           []1MHz   []3MHz W/cm2:  Location:    []  Iontophoresis with dexamethasone         Location: [] Take home patch   [] In clinic    []  Ice     []  heat  []  Ice massage  []  Laser   []  Anodyne Position:  Location:    []  Laser with stim  []  Other:  Position:  Location:    []  Vasopneumatic Device Pressure:       [] lo [] med [] hi   Temperature: [] lo [] med [] hi   [] Skin assessment post-treatment:  []intact []redness- no adverse reaction    []redness  adverse reaction:      min []Eval                  []Re-Eval        min Therapeutic Exercise:  [] See flow sheet :        min Therapeutic Activity:  []  See flow sheet :         min Neuromuscular Re-education: []  See flow sheet :       8 min Manual Therapy:  Use of \"the stick\" to right ITB region in left S/L   Rationale: decrease pain, increase tissue extensibility and decrease trigger points to normalize gait and function     min Gait Training:  ___ feet with ___ device on level surfaces with ___ level of assist   Rationale: With   [] TE   [] TA   [] neuro   [] other: Patient Education: [x] Review HEP    [] Progressed/Changed HEP based on:   [] positioning   [] body mechanics   [] transfers   [] heat/ice application    [] other:      Other Objective/Functional Measures:   Physical Therapy Evaluation- Hip  Pt RTC with similar complaints of right LE pain. Note pt was discharged from this clinic for right LE pain related to HS tendon rupture on 2/21/17. He now reports the pain radiates from right hip to right knee laterally (ITB region) that has progressively worsened in the past several weeks. Pain is still present in HS origin region, but not as intense as lateral thigh pain.   PLOF: ambulating with RW with less pain  Present Functional Limitations: walking, standing    Posture:    Gait:  [] Normal    [x] Abnormal    [x] Antalgic    [] NWB    Device:RW    Describe:         ROM/Strength        AROM                     PROM        Strength (1-5)  Hip Left Right Left Right Left Right   Flexion     4/5 4/5   Extension         Abduction in MMT position     3-/5 3/5   Adduction         ER         IR         Knee Left Right Left Right Left Right   Extension     4/5 4/5   Flexion     4+/5 4/5        Flexibility: [] Unable to assess at this time  Hamstrings:    (L) Tightness= [] WNL   [] Min   [x] Mod   [] Severe  15 degrees   (R) Tightness= [] WNL   [] Min   [x] Mod   [] Severe  15 degrees  Quadriceps:    (L) Tightness= [] WNL   [] Min   [] Mod   [] Severe    (R) Tightness= [] WNL   [] Min   [] Mod   [] Severe  Gastroc:    (L) Tightness= [] WNL   [] Min   [] Mod   [] Severe    (R) Tightness= [] WNL   [] Min   [] Mod [] Severe                                  Palpation: no TTP right HS origin region  [] Min  [x] Mod  [] Severe    Location: right ITB region  [] Min  [] Mod  [] Severe    Location:  [] Min  [] Mod  [] Severe    Location:    Optional Tests  Reuben/ Kimber Test: [] Neg    [] Pos  Phil Test:  [] Neg    [] Pos  Scouring Test: [] Neg    [] Pos  Trendelenberg:  [] Neg    [] Pos [] Left    [] Right  OberTest:   [] Neg    [] Pos  Ely's Test:  [] Neg    [] Pos  Piriformis Test:  [] Neg    [] Pos  Sub-talor alignment: [] Neurtral [] Pronation [] Supination  Forefoot alignment: [] Neutral [] Varus [] Valgus  Functional Leg Length (cm):   Right:  Left:  Discrepancy:    Other tests/ comments:  Sit to stand x 5 reps: 17 seconds  TUG score: 30 seconds  Long sit test: ( - )       Pain Level (0-10 scale) post treatment: 6/10     ASSESSMENT/Changes in Function: see POC    Patient will continue to benefit from skilled PT services to modify and progress therapeutic interventions, address functional mobility deficits, address ROM deficits, address strength deficits, analyze and address soft tissue restrictions, analyze and cue movement patterns, assess and modify postural abnormalities and address imbalance/dizziness to attain remaining goals.      [x]  See Plan of Care  []  See progress note/recertification  []  See Discharge Summary         Progress towards goals / Updated goals:  See POC    PLAN  [x]  Upgrade activities as tolerated     [x]  Continue plan of care  []  Update interventions per flow sheet       []  Discharge due to:_  [x]  Other: LE strengthening, manual techniques, mod prn, gait       Avery Silva PT 3/6/2017  10:55 AM    Future Appointments  Date Time Provider Flaquita Gamez   3/6/2017 11:00 AM Avery Silva, PT [id number]

## 2017-03-13 ENCOUNTER — HOSPITAL ENCOUNTER (OUTPATIENT)
Dept: PHYSICAL THERAPY | Age: 82
Discharge: HOME | End: 2017-03-13
Payer: MEDICARE

## 2017-03-13 PROCEDURE — 97110 THERAPEUTIC EXERCISES: CPT

## 2017-03-13 PROCEDURE — 97140 MANUAL THERAPY 1/> REGIONS: CPT

## 2017-03-13 NOTE — PROGRESS NOTES
PT DAILY TREATMENT NOTE - [facility]     Patient Name: Elsi Vigil  Date:3/13/2017  : 1933  [x]  Patient  Verified  Payor: [place] MEDICARE / Plan: VA MEDICARE PART A & B / Product Type: Medicare /    In time:325  Out time:413  Total Treatment Time (min): 48  Total Timed Codes (min): 38  1:1 Treatment Time ( [address] only): 30   Visit #: 2 of 8    Treatment Area: Leg pain, right [M79.604]    SUBJECTIVE  Pain Level (0-10 scale): 4/10  Any medication changes, allergies to medications, adverse drug reactions, diagnosis change, or new procedure performed?: [x] No    [] Yes (see summary sheet for update)  Subjective functional status/changes:   [x] No changes reported      OBJECTIVE    Modality rationale: decrease inflammation and decrease pain to improve the patients ability to increase activity/position tolerance   Min Type Additional Details    [] Estim:  []Unatt       []IFC  []Premod                        []Other:  []w/ice   []w/heat  Position:  Location:    [] Estim: []Att    []TENS instruct  []NMES                    []Other:  []w/US   []w/ice   []w/heat  Position:  Location:    []  Traction: [] Cervical       []Lumbar                       [] Prone          []Supine                       []Intermittent   []Continuous Lbs:  [] before manual  [] after manual    []  Ultrasound: []Continuous   [] Pulsed                           []1MHz   []3MHz W/cm2:  Location:    []  Iontophoresis with dexamethasone         Location: [] Take home patch   [] In clinic   10 [x]  Ice     []  heat  []  Ice massage  []  Laser   []  Anodyne Position: left S/L  Location: right ITB region    []  Laser with stim  []  Other:  Position:  Location:    []  Vasopneumatic Device Pressure:       [] lo [] med [] hi   Temperature: [] lo [] med [] hi   [] Skin assessment post-treatment:  []intact []redness- no adverse reaction    []redness  adverse reaction:      min []Eval                  []Re-Eval       30 min Therapeutic Exercise:  [x] See flow sheet :   Rationale: increase ROM, increase strength and improve balance to improve the patients ability to normalize gait     min Therapeutic Activity:  []  See flow sheet :         min Neuromuscular Re-education:  []  See flow sheet :       8 min Manual Therapy:  Use of \"the stick:\" to right ITB region with focus on distal half in left S/L   Rationale: decrease pain, increase tissue extensibility and decrease trigger points to normalize function     min Gait Training:  ___ feet with ___ device on level surfaces with ___ level of assist   Rationale: With   [] TE   [] TA   [] neuro   [] other: Patient Education: [x] Review HEP    [] Progressed/Changed HEP based on:   [] positioning   [] body mechanics   [] transfers   [] heat/ice application    [] other:      Other Objective/Functional Measures: none taken today     Pain Level (0-10 scale) post treatment: 2/10    ASSESSMENT/Changes in Function: see POC    Patient will continue to benefit from skilled PT services to modify and progress therapeutic interventions, address functional mobility deficits, address ROM deficits, address strength deficits, analyze and address soft tissue restrictions, analyze and cue movement patterns, analyze and modify body mechanics/ergonomics and assess and modify postural abnormalities to attain remaining goals.      [x]  See Plan of Care  []  See progress note/recertification  []  See Discharge Summary           PLAN  [x]  Upgrade activities as tolerated     [x]  Continue plan of care  []  Update interventions per flow sheet       []  Discharge due to:_  []  Other:_      Romelia Scott PT 3/13/2017  3:16 PM    Future Appointments  Date Time Provider Flaquita Gamez   3/13/2017 3:30 PM Shania Joyner [facility]   3/14/2017 1:00 PM EULOGIO Joyner [facility]   3/21/2017 1:00 PM EULOGIO Joyner [facility]   3/23/2017 2:00 PM EULOGIO Wiggins [facility]   3/27/2017 12:00 PM EULOGIO Joyner [facility]   3/30/2017 2:00 PM Wolfgang Sandra Carpenter PT [id number]   4/3/2017 1:00 PM [facility]OGIO Foster [facility]   4/6/2017 11:00 AM Cinderella Schwab, PTA [id number] [facility]

## 2017-03-14 ENCOUNTER — HOSPITAL ENCOUNTER (OUTPATIENT)
Dept: PHYSICAL THERAPY | Age: 82
Discharge: HOME | End: 2017-03-14
Payer: MEDICARE

## 2017-03-14 PROCEDURE — 97110 THERAPEUTIC EXERCISES: CPT

## 2017-03-14 PROCEDURE — 97140 MANUAL THERAPY 1/> REGIONS: CPT

## 2017-03-14 NOTE — PROGRESS NOTES
PT DAILY TREATMENT NOTE - [facility]     Patient Name: Sugar Randolph  Date:3/14/2017  : 1933  [x]  Patient  Verified  Payor: [place] MEDICARE / Plan: [place] MEDICARE PART A & B / Product Type: Medicare /    In time:1252  Out time:142  Total Treatment Time (min): 50  Total Timed Codes (min): 40  1:1 Treatment Time ( [address] only): 30   Visit #: 3 of 8    Treatment Area: Leg pain, right [M79.604]    SUBJECTIVE  Pain Level (0-10 scale): 610   Any medication changes, allergies to medications, adverse drug reactions, diagnosis change, or new procedure performed?: [x] No    [] Yes (see summary sheet for update)  Subjective functional status/changes:   [] No changes reported  Hurting a little more from yesterday, may be the weather.     OBJECTIVE    Modality rationale: decrease inflammation and decrease pain to improve the patients ability to increase activity/position tolerance   Min Type Additional Details    [] Estim:  []Unatt       []IFC  []Premod                        []Other:  []w/ice   []w/heat  Position:  Location:    [] Estim: []Att    []TENS instruct  []NMES                    []Other:  []w/US   []w/ice   []w/heat  Position:  Location:    []  Traction: [] Cervical       []Lumbar                       [] Prone          []Supine                       []Intermittent   []Continuous Lbs:  [] before manual  [] after manual    []  Ultrasound: []Continuous   [] Pulsed                           []1MHz   []3MHz W/cm2:  Location:    []  Iontophoresis with dexamethasone         Location: [] Take home patch   [] In clinic   10 [x]  Ice     []  heat  []  Ice massage  []  Laser   []  Anodyne Position: H/L supine  Location: right ITB region    []  Laser with stim  []  Other:  Position:  Location:    []  Vasopneumatic Device Pressure:       [] lo [] med [] hi   Temperature: [] lo [] med [] hi   [] Skin assessment post-treatment:  []intact []redness- no adverse reaction    []redness  adverse reaction:      min []Eval []Re-Eval       32 min Therapeutic Exercise:  [x] See flow sheet :   Rationale: increase ROM and increase strength to improve the patients ability to normalize gait     min Therapeutic Activity:  []  See flow sheet :         min Neuromuscular Re-education:  []  See flow sheet :       8 min Manual Therapy:  Use of \"the stick:\" to right ITB region with focus on distal half in left S/L   Rationale: decrease pain, increase ROM, increase tissue extensibility and decrease trigger points to normalize function     min Gait Training:  ___ feet with ___ device on level surfaces with ___ level of assist   Rationale: With   [] TE   [] TA   [] neuro   [] other: Patient Education: [x] Review HEP    [] Progressed/Changed HEP based on:   [] positioning   [] body mechanics   [] transfers   [] heat/ice application    [] other:      Other Objective/Functional Measures: see goal review     Pain Level (0-10 scale) post treatment: 3/10    ASSESSMENT/Changes in Function: No significant changes in TUG score time. Pain reduced by 50% at end of session. Patient will continue to benefit from skilled PT services to modify and progress therapeutic interventions, address functional mobility deficits, address ROM deficits, address strength deficits, analyze and address soft tissue restrictions, analyze and cue movement patterns and assess and modify postural abnormalities to attain remaining goals. []  See Plan of Care  []  See progress note/recertification  []  See Discharge Summary         Progress towards goals / Updated goals:  Short Term Goals: To be accomplished in 2 weeks:  1. Patient will be independent and compliant with HEP to achieve other goals. Status at eval: not independent/compliant  Current Status: progressing    2. Pt will report </= 6/10 pain level at worst to increase activity/position tolerance. Status at eval: 8-9/10  Current Status: no change     Long Term Goals: To be accomplished in 4 weeks:  1.  Improve FOTO score to >/= 53/100 to indicate decreased pain with ADL's. Status at eval: 47/100  Current Status: retest visit 5    2. Improve TUG score to </= 20 seconds to demo improved balance with gait. Status at eval: 30 seconds  Current Status: 28 seconds    3. Abolish tenderness to palpation to right ITB region to increase activity/position tolerance. Status at eval: moderate tenderness to palpation  Current Status: no change    4. Pt will report </= 4/10 pain level at worst to increase activity/position tolerance.   Status at eval: 8-9/10  Current Status: no change       PLAN  [x]  Upgrade activities as tolerated     [x]  Continue plan of care  []  Update interventions per flow sheet       []  Discharge due to:_  []  Other:_      Nikolas Pérez PT 3/14/2017  12:57 PM    Future Appointments  Date Time Provider Flaquita Gamez   3/14/2017 1:00 PM EULOGIO JiangHPTPETE THE [facility]   3/21/2017 1:00 PM Nikolas Pérez PT [facility]   3/23/2017 2:00 PM Nikolas Pérez PT [facility]   3/27/2017 12:00 PM EULOGIO JiangHPTPETE THE [facility]   3/30/2017 2:00 PM EULOGIO WigginsHPTPETE THE [facility]   4/3/2017 1:00 PM EULOGIO JiangHPTPETE THE [facility]   4/6/2017 11:00 AM ALFRED DowdHPTVY THE [facility]

## 2017-03-21 ENCOUNTER — HOSPITAL ENCOUNTER (OUTPATIENT)
Dept: PHYSICAL THERAPY | Age: 82
Discharge: HOME | End: 2017-03-21
Payer: MEDICARE

## 2017-03-21 PROCEDURE — 97140 MANUAL THERAPY 1/> REGIONS: CPT

## 2017-03-21 PROCEDURE — 97110 THERAPEUTIC EXERCISES: CPT

## 2017-03-21 NOTE — PROGRESS NOTES
PT DAILY TREATMENT NOTE - [facility]     Patient Name: Alex Forbes  Date:3/21/2017  : 1933  [x]  Patient  Verified  Payor: [place] MEDICARE / Plan: VA MEDICARE PART A & B / Product Type: Medicare /    In time:1251  Out time:141  Total Treatment Time (min): 50  Total Timed Codes (min): 40  1:1 Treatment Time ( [address] only): 38   Visit #: 4 of 8    Treatment Area: Leg pain, right [M79.604]    SUBJECTIVE  Pain Level (0-10 scale): 6/10   Any medication changes, allergies to medications, adverse drug reactions, diagnosis change, or new procedure performed?: [x] No    [] Yes (see summary sheet for update)  Subjective functional status/changes:   [] No changes reported  It's the same.     OBJECTIVE    Modality rationale: decrease inflammation and decrease pain to improve the patients ability to increase activity/position tolerance   Min Type Additional Details    [] Estim:  []Unatt       []IFC  []Premod                        []Other:  []w/ice   []w/heat  Position:  Location:    [] Estim: []Att    []TENS instruct  []NMES                    []Other:  []w/US   []w/ice   []w/heat  Position:  Location:    []  Traction: [] Cervical       []Lumbar                       [] Prone          []Supine                       []Intermittent   []Continuous Lbs:  [] before manual  [] after manual    []  Ultrasound: []Continuous   [] Pulsed                           []1MHz   []3MHz W/cm2:  Location:    []  Iontophoresis with dexamethasone         Location: [] Take home patch   [] In clinic   10 [x]  Ice     []  heat  []  Ice massage  []  Laser   []  Anodyne Position: H/L supine  Location:right ITB region    []  Laser with stim  []  Other:  Position:  Location:    []  Vasopneumatic Device Pressure:       [] lo [] med [] hi   Temperature: [] lo [] med [] hi   [] Skin assessment post-treatment:  []intact []redness- no adverse reaction    []redness  adverse reaction:      min []Eval                  []Re-Eval       32 min Therapeutic Exercise:  [x] See flow sheet :   Rationale: increase ROM and increase strength to improve the patients ability to normalize gait     min Therapeutic Activity:  []  See flow sheet :      min Neuromuscular Re-education:  []  See flow sheet :       8 min Manual Therapy:  Use of the stick to right ITB region with focus on middle to distal half in left S/L   Rationale: decrease pain, increase ROM, increase tissue extensibility and decrease trigger points to normalize function     min Gait Training:  ___ feet with ___ device on level surfaces with ___ level of assist   Rationale: With   [] TE   [] TA   [] neuro   [] other: Patient Education: [x] Review HEP    [] Progressed/Changed HEP based on:   [] positioning   [] body mechanics   [] transfers   [] heat/ice application    [] other:      Other Objective/Functional Measures: none taken today     Pain Level (0-10 scale) post treatment: 3/10    ASSESSMENT/Changes in Function: Pt receiving very short-term relief with PT but not getting carryover between sessions. Patient will continue to benefit from skilled PT services to modify and progress therapeutic interventions, address functional mobility deficits, address ROM deficits, address strength deficits, analyze and address soft tissue restrictions, analyze and cue movement patterns, analyze and modify body mechanics/ergonomics, assess and modify postural abnormalities and address imbalance/dizziness to attain remaining goals.      [x]  See Plan of Care  []  See progress note/recertification  []  See Discharge Summary             PLAN  [x]  Upgrade activities as tolerated     [x]  Continue plan of care  []  Update interventions per flow sheet       []  Discharge due to:_  [x]  Other: Shima Griffin next visit      Rasheeda Murphy PT 3/21/2017  12:54 PM    Future Appointments  Date Time Provider Flaquita Gamez   3/21/2017 1:00 PM Rasheeda Murphy PT [facility]PT[facility]   3/23/2017 2:00 PM EULOGIO Tolbert [facility]   3/27/2017 12:00 PM Bill Lizama, PT [facility]   3/30/2017 2:00 PM Bill Lizama, EULOGIO WHITFIELD [facility]   4/3/2017 1:00 PM Bill Lizama, EULOGIO NIEVESTPETE [facility]   4/6/2017 11:00 AM Khanh Pritchett PTA [facility]

## 2017-03-27 ENCOUNTER — HOSPITAL ENCOUNTER (OUTPATIENT)
Dept: PHYSICAL THERAPY | Age: 82
Discharge: HOME | End: 2017-03-27
Payer: MEDICARE

## 2017-03-27 PROCEDURE — 97110 THERAPEUTIC EXERCISES: CPT

## 2017-03-27 PROCEDURE — 97140 MANUAL THERAPY 1/> REGIONS: CPT

## 2017-03-27 NOTE — PROGRESS NOTES
PT DAILY TREATMENT NOTE - [facility]     Patient Name: Lynn Rodriguez  Date:3/27/2017  : 1933  [x]  Patient  Verified  Payor: VA MEDICARE / Plan: VA MEDICARE PART A & B / Product Type: Medicare /    In ACIH:4079  Out time:1250  Total Treatment Time (min): 52  Total Timed Codes (min): 42  1:1 Treatment Time ( only): 42   Visit #: 6 of 8    Treatment Area: Leg pain, right [M79.604]    SUBJECTIVE  Pain Level (0-10 scale): 5/10  Any medication changes, allergies to medications, adverse drug reactions, diagnosis change, or new procedure performed?: [x] No    [] Yes (see summary sheet for update)  Subjective functional status/changes:   [] No changes reported  My neuro    OBJECTIVE    Modality rationale: decrease inflammation and decrease pain to improve the patients ability to increase activity/position tolerance   Min Type Additional Details    [] Estim:  []Unatt       []IFC  []Premod                        []Other:  []w/ice   []w/heat  Position:  Location:    [] Estim: []Att    []TENS instruct  []NMES                    []Other:  []w/US   []w/ice   []w/heat  Position:  Location:    []  Traction: [] Cervical       []Lumbar                       [] Prone          []Supine                       []Intermittent   []Continuous Lbs:  [] before manual  [] after manual    []  Ultrasound: []Continuous   [] Pulsed                           []1MHz   []3MHz W/cm2:  Location:    []  Iontophoresis with dexamethasone         Location: [] Take home patch   [] In clinic   10 [x]  Ice     []  heat  []  Ice massage  []  Laser   []  Anodyne Position: left S/L  Location:right ITB region    []  Laser with stim  []  Other:  Position:  Location:    []  Vasopneumatic Device Pressure:       [] lo [] med [] hi   Temperature: [] lo [] med [] hi   [] Skin assessment post-treatment:  []intact []redness- no adverse reaction    []redness  adverse reaction:      min []Eval                  []Re-Eval       34 min Therapeutic Exercise:  [x] See flow sheet :   Rationale: increase ROM and increase strength to improve the patients ability to normalize gait     min Therapeutic Activity:  []  See flow sheet :         min Neuromuscular Re-education:  []  See flow sheet :       8 min Manual Therapy:  Use of the stick to right ITB region with focus on middle to distal half in left S/L   Rationale: decrease pain, increase ROM, increase tissue extensibility and decrease trigger points to normalize function     min Gait Training:  ___ feet with ___ device on level surfaces with ___ level of assist   Rationale: With   [] TE   [] TA   [] neuro   [] other: Patient Education: [x] Review HEP    [] Progressed/Changed HEP based on:   [] positioning   [] body mechanics   [] transfers   [] heat/ice application    [] other:      Other Objective/Functional Measures: see goal review     Pain Level (0-10 scale) post treatment: 2/10    ASSESSMENT/Changes in Function: FOTO score has mildly regressed from [facility]. Tenderness to palpation to right ITB has improved. TUG score and intensity of pain have only minimally improved. Patient will continue to benefit from skilled PT services to modify and progress therapeutic interventions, address functional mobility deficits, address strength deficits, analyze and address soft tissue restrictions, analyze and cue movement patterns, assess and modify postural abnormalities and address imbalance/dizziness to attain remaining goals. []  See Plan of Care  []  See progress note/recertification  []  See Discharge Summary         Progress towards goals / Updated goals:  Short Term Goals: To be accomplished in 2 weeks:  1. Patient will be independent and compliant with HEP to achieve other goals. Status at eval: not independent/compliant  Current Status: progressing      2. Pt will report </= 6/10 pain level at worst to increase activity/position tolerance.   Status at eval: -9/10  Current Status: slowly progressin/10 at worst      Long Term Goals: To be accomplished in 4 weeks:  1. Improve FOTO score to >/= 53/100 to indicate decreased pain with ADL's. Status at eval: 47/100  Current Status: not met: 44/100     2. Improve TUG score to </= 20 seconds to demo improved balance with gait. Status at eval: 30 seconds  Current Status: slowly progressing 27 seconds     3. Abolish tenderness to palpation to right ITB region to increase activity/position tolerance. Status at eval: moderate tenderness to palpation  Current Status: progressing: mild tenderness      4. Pt will report </= 4/10 pain level at worst to increase activity/position tolerance.   Status at eval: 8-9/10  Current Status: slowly progressin/10 at worst          PLAN  [x]  Upgrade activities as tolerated     [x]  Continue plan of care  []  Update interventions per flow sheet       []  Discharge due to:_  [x]  Other: D/C in 2 visits to HEP: pt in agreement with this plan      Jayme Duenas PT 3/27/2017  11:54 AM    Future Appointments  Date Time Provider Flaquita Gamez   3/27/2017 12:00 PM Jayme Duenas PT [facility]PT[facility]   3/30/2017 2:00 PM Jayme Duenas PT [facility]PT[facility]   4/3/2017 1:00 PM Jayme Duenas PT MIHPANDREEA [facility]   2017 11:00 AM Loni Hale, PTA MIHPTJANELLEY THE [facility]

## 2017-03-30 ENCOUNTER — HOSPITAL ENCOUNTER (OUTPATIENT)
Dept: PHYSICAL THERAPY | Age: 82
Discharge: HOME | End: 2017-03-30
Payer: MEDICARE

## 2017-03-30 PROCEDURE — 97110 THERAPEUTIC EXERCISES: CPT

## 2017-03-30 NOTE — PROGRESS NOTES
PT DAILY TREATMENT NOTE - [facility]     Patient Name: Jake Lancaster  Date:3/30/2017  : 1933  [x]  Patient  Verified  Payor: [employer] / Plan: [place] MEDICARE PART A & B / Product Type: Medicare /    In time:153  Out time:241  Total Treatment Time (min): 48  Total Timed Codes (min): 38  1:1 Treatment Time ( [address] only): 38   Visit #: 7 of 8    Treatment Area: Leg pain, right [M79.604]    SUBJECTIVE  Pain Level (0-10 scale): 4/10  Any medication changes, allergies to medications, adverse drug reactions, diagnosis change, or new procedure performed?: [x] No    [] Yes (see summary sheet for update)  Subjective functional status/changes:   [x] No changes reported      OBJECTIVE    Modality rationale: decrease inflammation and decrease pain to improve the patients ability to increase activity/position tolerance   Min Type Additional Details    [] Estim:  []Unatt       []IFC  []Premod                        []Other:  []w/ice   []w/heat  Position:  Location:    [] Estim: []Att    []TENS instruct  []NMES                    []Other:  []w/US   []w/ice   []w/heat  Position:  Location:    []  Traction: [] Cervical       []Lumbar                       [] Prone          []Supine                       []Intermittent   []Continuous Lbs:  [] before manual  [] after manual    []  Ultrasound: []Continuous   [] Pulsed                           []1MHz   []3MHz W/cm2:  Location:    []  Iontophoresis with dexamethasone         Location: [] Take home patch   [] In clinic   10 [x]  Ice     []  heat  []  Ice massage  []  Laser   []  Anodyne Position: H/L supine  Location:right ITB region    []  Laser with stim  []  Other:  Position:  Location:    []  Vasopneumatic Device Pressure:       [] lo [] med [] hi   Temperature: [] lo [] med [] hi   [] Skin assessment post-treatment:  []intact []redness- no adverse reaction    []redness  adverse reaction:      min []Eval                  []Re-Eval       38 min Therapeutic Exercise:  [x] See flow sheet :   Rationale: increase ROM and increase strength to improve the patients ability to normalize gait and function     min Therapeutic Activity:  []  See flow sheet :         min Neuromuscular Re-education:  []  See flow sheet :        min Manual Therapy:          min Gait Training:  ___ feet with ___ device on level surfaces with ___ level of assist   Rationale: With   [] TE   [] TA   [] neuro   [] other: Patient Education: [x] Review HEP    [] Progressed/Changed HEP based on:   [] positioning   [] body mechanics   [] transfers   [] heat/ice application    [] other:      Other Objective/Functional Measures: none taken today     Pain Level (0-10 scale) post treatment: 2/10    ASSESSMENT/Changes in Function: No new progress. Patient will continue to benefit from skilled PT services to modify and progress therapeutic interventions, address functional mobility deficits, address ROM deficits, address strength deficits, analyze and address soft tissue restrictions, analyze and cue movement patterns, analyze and modify body mechanics/ergonomics and assess and modify postural abnormalities to attain remaining goals.      [x]  See Plan of Care  []  See progress note/recertification  []  See Discharge Summary           PLAN  [x]  Upgrade activities as tolerated     [x]  Continue plan of care  []  Update interventions per flow sheet       []  Discharge due to:_  [x]  Other: D/C next visit to [facility] with progress plateauing at end of cert period    Manasa Monae PT 3/30/2017  1:51 PM    Future Appointments  Date Time Provider Flaquita Gamez   3/30/2017 2:00 PM Niecy Ureña THE [facility]   4/3/2017 1:00 PM EULOGIO Ureña THE [facility]   4/6/2017 11:00 AM ALFRED King THE [facility]

## 2017-04-03 ENCOUNTER — HOSPITAL ENCOUNTER (OUTPATIENT)
Dept: PHYSICAL THERAPY | Age: 82
Discharge: HOME | End: 2017-04-03
Payer: MEDICARE

## 2017-04-03 PROCEDURE — 97110 THERAPEUTIC EXERCISES: CPT

## 2017-04-03 PROCEDURE — G8979 MOBILITY GOAL STATUS: HCPCS

## 2017-04-03 PROCEDURE — G8980 MOBILITY D/C STATUS: HCPCS

## 2017-04-03 NOTE — PROGRESS NOTES
PT DAILY TREATMENT NOTE - [facility]     Patient Name: Pia Stephenson  Date:4/3/2017  : 1933  [x]  Patient  Verified  Payor: [place] MEDICARE / Plan: VA MEDICARE PART A & B / Product Type: Medicare /    In time:257  Out time:340  Total Treatment Time (min): 43  Total Timed Codes (min): 33  1:1 Treatment Time ( only): 33   Visit #: 8 of 8    Treatment Area: Leg pain, right [M79.604]    SUBJECTIVE  Pain Level (0-10 scale): 5/10  Any medication changes, allergies to medications, adverse drug reactions, diagnosis change, or new procedure performed?: [x] No    [] Yes (see summary sheet for update)  Subjective functional status/changes:   [x] No changes reported      OBJECTIVE    Modality rationale:    Min Type Additional Details    [] Estim:  []Unatt       []IFC  []Premod                        []Other:  []w/ice   []w/heat  Position:  Location:    [] Estim: []Att    []TENS instruct  []NMES                    []Other:  []w/US   []w/ice   []w/heat  Position:  Location:    []  Traction: [] Cervical       []Lumbar                       [] Prone          []Supine                       []Intermittent   []Continuous Lbs:  [] before manual  [] after manual    []  Ultrasound: []Continuous   [] Pulsed                           []1MHz   []3MHz W/cm2:  Location:    []  Iontophoresis with dexamethasone         Location: [] Take home patch   [] In clinic    []  Ice     []  heat  []  Ice massage  []  Laser   []  Anodyne Position:  Location:    []  Laser with stim  []  Other:  Position:  Location:    []  Vasopneumatic Device Pressure:       [] lo [] med [] hi   Temperature: [] lo [] med [] hi   [] Skin assessment post-treatment:  []intact []redness- no adverse reaction    []redness  adverse reaction:      min []Eval                  []Re-Eval       33 min Therapeutic Exercise:  [x] See flow sheet :   Rationale: increase ROM, increase strength, improve balance and increase proprioception to improve the patients ability to normalize gait and function     min Therapeutic Activity:  []  See flow sheet :         min Neuromuscular Re-education:  []  See flow sheet :        min Manual Therapy:          min Gait Training:  ___ feet with ___ device on level surfaces with ___ level of assist   Rationale: With   [] TE   [] TA   [] neuro   [] other: Patient Education: [x] Review HEP    [] Progressed/Changed HEP based on:   [] positioning   [] body mechanics   [] transfers   [] heat/ice application    [] other:      Other Objective/Functional Measures: see goal review     Pain Level (0-10 scale) post treatment: 2/10    ASSESSMENT/Changes in Function: Pain has mildly improved from [facility]. Pt's comorbid conditions of neuropathy and pre-existing back pathology have limited his overall progress and function. Pt in agreement to D/C PT at this time with continued self-management of symptoms and he is to F/U with MD later this month. []  See Plan of Care  []  See progress note/recertification  [x]  See Discharge Summary         Progress towards goals / Updated goals:  Short Term Goals: To be accomplished in 2 weeks:  1. Patient will be independent and compliant with HEP to achieve other goals. Status at eval: not independent/compliant  Current Status:pt reports compliance      2. Pt will report </= 6/10 pain level at worst to increase activity/position tolerance. Status at eval: 8-9/10  Current Status: slowly progressin/10 at worst      Long Term Goals: To be accomplished in 4 weeks:  1. Improve FOTO score to >/= 53/100 to indicate decreased pain with ADL's. Status at eval: 47/100  Current Status: not met: 44/100      2. Improve TUG score to </= 20 seconds to demo improved balance with gait. Status at eval: 30 seconds  Current Status: not met: 28 seconds      3. Abolish tenderness to palpation to right ITB region to increase activity/position tolerance.   Status at eval: moderate tenderness to palpation  Current Status: progressing: mild tenderness       4. Pt will report </= 4/10 pain level at worst to increase activity/position tolerance.   Status at eval: 8-9/10  Current Status: not met: 7/10 at worst        PLAN  []  Upgrade activities as tolerated     []  Continue plan of care  []  Update interventions per flow sheet       [x]  Discharge due to: completion of program with progress plateaued  []  Other:_      Alberto Mccray, PT 4/3/2017  3:00 PM    Future Appointments  Date Time Provider Flaquita Gamez   4/6/2017 11:00 AM Reno Rosen, PTA MIHPTVY THE [facility]

## 2017-04-06 ENCOUNTER — APPOINTMENT (OUTPATIENT)
Dept: PHYSICAL THERAPY | Age: 82
End: 2017-04-06
Payer: MEDICARE

## 2017-04-11 NOTE — PROGRESS NOTES
[facility] at [address] [place] [address]  Phone: [phone]   Fax: [phone]    Discharge Summary    Patient name: Genevieve Moeller     Start of Care: 3/6/17  Referral source: Charna Frankel, MD    : 1933  Medical/Treatment Diagnosis: Leg pain, right [M79.604]  Onset Date:end 2016  Prior Hospitalization: see medical history   Provider#: [id number]  Comorbidities: diabetes, arthritis, high blood pressure, hearing impaired, peripheral neuropathy, Right TKR, Right ankle replacement  Prior Level of Function:ambulating with RW with less pain in right LE  Medications: Verified on Patient Summary List    Visits from Start of Care: 8    Missed Visits: 0  Reporting Period : 3/6/17 to 4/3/17    Short Term Goals: To be accomplished in 2 weeks:  1. Patient will be independent and compliant with HEP to achieve other goals. Status at eval: not independent/compliant  Current Status:pt reports compliance      2. Pt will report </= 6/10 pain level at worst to increase activity/position tolerance. Status at eval: 8-/10  Current Status: slowly progressin/10 at worst      Long Term Goals: To be accomplished in 4 weeks:  1. Improve FOTO score to >/= 53/100 to indicate decreased pain with ADL's. Status at eval: 47/100  Current Status: not met: 44/100      2. Improve TUG score to </= 20 seconds to demo improved balance with gait. Status at eval: 30 seconds  Current Status: not met: 28 seconds      3. Abolish tenderness to palpation to right ITB region to increase activity/position tolerance. Status at eval: moderate tenderness to palpation  Current Status: progressing: mild tenderness       4. Pt will report </= 4/10 pain level at worst to increase activity/position tolerance.   Status at eval: 8-9/10  Current Status: not met: 7/10 at worst      G-Codes (GP)  Mobility    Goal  CK= 40-59%  D/C  CK= 40-59%    The severity rating is based on clinical judgment and the FOTO score. Assessment/ Summary of Care: Pain has mildly improved from [facility] with progress currently plateaued. Pt's comorbid conditions of neuropathy and pre-existing back pathology have limited his overall progress and function. Pt in agreement to D/C PT at this time with HEP to continue self-management of symptoms and he is to F/U with MD later this month.     RECOMMENDATIONS:  [x]Discontinue therapy: []Patient has reached or is progressing toward set goals      []Patient is non-compliant or has abdicated      [x]Due to lack of appreciable progress towards set goals    Sarah Gaston, PT 4/11/2017 1:24 PM

## 2017-05-09 ENCOUNTER — HOSPITAL ENCOUNTER (OUTPATIENT)
Dept: PREADMISSION TESTING | Age: 82
Discharge: HOME | End: 2017-05-09
Payer: MEDICARE

## 2017-05-09 PROBLEM — I10 HTN (HYPERTENSION): Chronic | Status: ACTIVE | Noted: 2017-05-09

## 2017-05-09 PROBLEM — S76.111A RUPTURE OF RIGHT QUADRICEPS TENDON: Chronic | Status: ACTIVE | Noted: 2017-05-09

## 2017-05-09 PROBLEM — E78.5 HYPERLIPIDEMIA: Chronic | Status: ACTIVE | Noted: 2017-05-09

## 2017-05-09 PROBLEM — E11.9 DIABETES MELLITUS TYPE 2, CONTROLLED (HCC): Chronic | Status: ACTIVE | Noted: 2017-05-09

## 2017-05-09 LAB
ANION GAP BLD CALC-SCNC: 9 MMOL/L (ref 3–18)
ATRIAL RATE: 80 BPM
BUN SERPL-MCNC: 27 MG/DL (ref 7–18)
BUN/CREAT SERPL: 19 (ref 12–20)
CALCIUM SERPL-MCNC: 9.3 MG/DL (ref 8.5–10.1)
CALCULATED P AXIS, ECG09: 14 DEGREES
CALCULATED R AXIS, ECG10: 12 DEGREES
CALCULATED T AXIS, ECG11: 67 DEGREES
CHLORIDE SERPL-SCNC: 103 MMOL/L (ref 100–108)
CO2 SERPL-SCNC: 32 MMOL/L (ref 21–32)
CREAT SERPL-MCNC: 1.41 MG/DL (ref 0.6–1.3)
DIAGNOSIS, 93000: NORMAL
ERYTHROCYTE [DISTWIDTH] IN BLOOD BY AUTOMATED COUNT: 15.4 % (ref 11.6–14.5)
GLUCOSE SERPL-MCNC: 126 MG/DL (ref 74–99)
HCT VFR BLD AUTO: 37.5 % (ref 36–48)
HGB BLD-MCNC: 12.1 G/DL (ref 13–16)
MCH RBC QN AUTO: 28.3 PG (ref 24–34)
MCHC RBC AUTO-ENTMCNC: 32.3 G/DL (ref 31–37)
MCV RBC AUTO: 87.6 FL (ref 74–97)
P-R INTERVAL, ECG05: 204 MS
PLATELET # BLD AUTO: 140 K/UL (ref 135–420)
PMV BLD AUTO: 10.3 FL (ref 9.2–11.8)
POTASSIUM SERPL-SCNC: 4.4 MMOL/L (ref 3.5–5.5)
Q-T INTERVAL, ECG07: 370 MS
QRS DURATION, ECG06: 84 MS
QTC CALCULATION (BEZET), ECG08: 426 MS
RBC # BLD AUTO: 4.28 M/UL (ref 4.7–5.5)
SODIUM SERPL-SCNC: 144 MMOL/L (ref 136–145)
VENTRICULAR RATE, ECG03: 80 BPM
WBC # BLD AUTO: 7.3 K/UL (ref 4.6–13.2)

## 2017-05-09 PROCEDURE — 36415 COLL VENOUS BLD VENIPUNCTURE: CPT | Performed by: ORTHOPAEDIC SURGERY

## 2017-05-09 PROCEDURE — 93005 ELECTROCARDIOGRAM TRACING: CPT

## 2017-05-09 PROCEDURE — 80048 BASIC METABOLIC PNL TOTAL CA: CPT | Performed by: ORTHOPAEDIC SURGERY

## 2017-05-09 PROCEDURE — 85027 COMPLETE CBC AUTOMATED: CPT | Performed by: ORTHOPAEDIC SURGERY

## 2017-05-09 NOTE — H&P
History and Physical        Patient: Pia Stephenson               Sex: male          DOA: (Not on file)         YOB: 1933      Age:  80 y.o.        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 The patient is in for follow up. We have discussed the different options for his right knee. He wants to see if we can try to get his quadriceps repaired. X-rays of the right knee were reviewed from before show patella baja. Past Medical History:   Diagnosis Date    Arthritis     Diabetes ([address] 2005    diet controlled    Hypercholesteremia     Hypertension 2005    Urinary urgency        Past Surgical History:   Procedure Laterality Date    HX APPENDECTOMY      HX BACK SURGERY      HX CATARACT REMOVAL      bilateral    HX VASECTOMY         No family history on file. Social History     Social History    Marital status:      Spouse name: N/A    Number of children: N/A    Years of education: N/A     Social History Main Topics    Smoking status: Never Smoker    Smokeless tobacco: Not on file    Alcohol use 1.2 oz/week     1 Cans of beer, 1 Glasses of wine per week      Comment: twice a month    Drug use: No    Sexual activity: Not on file     Other Topics Concern    Not on file     Social History Narrative    No narrative on file       Prior to Admission medications    Medication Sig Start Date End Date Taking? Authorizing Provider   trandolapril (MAVIK) 2 mg tablet Take 2 mg by mouth daily. Indications: hypertension    Historical Provider   ezetimibe (ZETIA) 10 mg tablet Take  by mouth nightly. Historical Provider   rosuvastatin (CRESTOR) 10 mg tablet Take 10 mg by mouth nightly. Historical Provider   pregabalin (LYRICA) 150 mg capsule Take  by mouth two (2) times a day.  Indications: DIABETIC PERIPHERAL NEUROPATHY    Historical Provider   potassium chloride SR (KLOR-CON 8) 8 mEq tablet Take 8 mEq by mouth daily. Historical Provider   amLODIPine (NORVASC) 5 mg tablet Take 5 mg by mouth daily. Historical Provider   tolterodine ER (DETROL LA) 4 mg ER capsule Take 4 mg by mouth nightly. Indications: URINARY URGENCY    Historical Provider   furosemide (LASIX) 20 mg tablet Take  by mouth daily. Indications: h/o cellulitis    Historical Provider   methocarbamol (ROBAXIN) 750 mg tablet Take  by mouth three (3) times daily as needed. Historical Provider   traMADol (ULTRAM) 50 mg tablet Take 50 mg by mouth every six (6) hours as needed for Pain. Indications: Pain    Historical Provider       No Known Allergies    Review of Systems  A comprehensive review of systems was negative except for that written in the History of Present Illness. Physical Exam:      There were no vitals taken for this visit. Physical Exam:  On exam the right knee has a palpable defect in the superior area of the patella, with a one to two inch gap between the distal quad and this. He has a straight leg raise at about -25 degrees. He has varus and valgus instability of his knee at mid-flexion. Assessment/Plan     Principal Problem:    Rupture of right quadriceps tendon (5/9/2017)    Active Problems:    Diabetes mellitus type 2, controlled ([facility] [address] (5/9/2017)      Hyperlipidemia (5/9/2017)      HTN (hypertension) (5/9/2017)       He has decided to go ahead and proceed with the operation of right quadriceps tendon repair. With the partial rupture he had from before it may be complete now and it may require partial repair as well as some modifications to try to improve his situation. Dr. Lisandra Rosa talked to him about the long-term postop rehab with the range of motion brace afterwards. Dr. Lisandra Rosa issued Roxicodone and Keflex for perioperative use. Dr. Lisandra Rosa will see him one week postop.   He talked about ice and elevation postoperatively and Dr. Mayer Shall' postop protocol. Dr. Mayer Shall talked to the patient about the risks, the alternatives, and the benefits including infection, pain and bleeding.

## 2017-05-10 RX ORDER — DIPHENHYDRAMINE HCL 25 MG
25 CAPSULE ORAL
Status: CANCELLED | OUTPATIENT
Start: 2017-05-10

## 2017-05-10 RX ORDER — DEXTROSE 50 % IN WATER (D50W) INTRAVENOUS SYRINGE
50 AS NEEDED
Status: CANCELLED | OUTPATIENT
Start: 2017-05-10

## 2017-05-10 RX ORDER — SODIUM CHLORIDE 0.9 % (FLUSH) 0.9 %
5-10 SYRINGE (ML) INJECTION AS NEEDED
Status: CANCELLED | OUTPATIENT
Start: 2017-05-10

## 2017-05-10 RX ORDER — SODIUM CHLORIDE 0.9 % (FLUSH) 0.9 %
5-10 SYRINGE (ML) INJECTION EVERY 8 HOURS
Status: CANCELLED | OUTPATIENT
Start: 2017-05-10

## 2017-05-10 RX ORDER — INSULIN LISPRO 100 [IU]/ML
INJECTION, SOLUTION INTRAVENOUS; SUBCUTANEOUS ONCE
Status: CANCELLED | OUTPATIENT
Start: 2017-05-11 | End: 2017-05-11

## 2017-05-11 ENCOUNTER — ANESTHESIA EVENT (OUTPATIENT)
Dept: SURGERY | Age: 82
End: 2017-05-11
Payer: MEDICARE

## 2017-05-11 ENCOUNTER — HOSPITAL ENCOUNTER (OUTPATIENT)
Age: 82
Setting detail: OUTPATIENT SURGERY
Discharge: HOME | End: 2017-05-11
Attending: ORTHOPAEDIC SURGERY | Admitting: ORTHOPAEDIC SURGERY
Payer: MEDICARE

## 2017-05-11 ENCOUNTER — ANESTHESIA (OUTPATIENT)
Dept: SURGERY | Age: 82
End: 2017-05-11
Payer: MEDICARE

## 2017-05-11 VITALS
RESPIRATION RATE: 18 BRPM | HEART RATE: 82 BPM | SYSTOLIC BLOOD PRESSURE: 110 MMHG | WEIGHT: 211.8 LBS | DIASTOLIC BLOOD PRESSURE: 46 MMHG | OXYGEN SATURATION: 95 % | BODY MASS INDEX: 34.04 KG/M2 | HEIGHT: 66 IN | TEMPERATURE: 97.4 F

## 2017-05-11 LAB
GLUCOSE BLD STRIP.AUTO-MCNC: 102 MG/DL (ref 70–110)
GLUCOSE BLD STRIP.AUTO-MCNC: 104 MG/DL (ref 70–110)

## 2017-05-11 PROCEDURE — 76010000153 HC OR TIME 1.5 TO 2 HR: Performed by: ORTHOPAEDIC SURGERY

## 2017-05-11 PROCEDURE — 82962 GLUCOSE BLOOD TEST: CPT

## 2017-05-11 PROCEDURE — 76060000034 HC ANESTHESIA 1.5 TO 2 HR: Performed by: ORTHOPAEDIC SURGERY

## 2017-05-11 PROCEDURE — 77030003666 HC NDL SPINAL BD -A: Performed by: ORTHOPAEDIC SURGERY

## 2017-05-11 PROCEDURE — 76210000006 HC OR PH I REC 0.5 TO 1 HR: Performed by: ORTHOPAEDIC SURGERY

## 2017-05-11 PROCEDURE — 74011000250 HC RX REV CODE- 250: Performed by: ORTHOPAEDIC SURGERY

## 2017-05-11 PROCEDURE — A6209 FOAM DRSG <=16 SQ IN W/O BDR: HCPCS | Performed by: ORTHOPAEDIC SURGERY

## 2017-05-11 PROCEDURE — 74011250636 HC RX REV CODE- 250/636: Performed by: ANESTHESIOLOGY

## 2017-05-11 PROCEDURE — 87116 MYCOBACTERIA CULTURE: CPT | Performed by: ORTHOPAEDIC SURGERY

## 2017-05-11 PROCEDURE — 74011250636 HC RX REV CODE- 250/636: Performed by: PHYSICIAN ASSISTANT

## 2017-05-11 PROCEDURE — 87070 CULTURE OTHR SPECIMN AEROBIC: CPT | Performed by: ORTHOPAEDIC SURGERY

## 2017-05-11 PROCEDURE — 74011250636 HC RX REV CODE- 250/636

## 2017-05-11 PROCEDURE — 76210000026 HC REC RM PH II 1 TO 1.5 HR: Performed by: ORTHOPAEDIC SURGERY

## 2017-05-11 PROCEDURE — 87075 CULTR BACTERIA EXCEPT BLOOD: CPT | Performed by: ORTHOPAEDIC SURGERY

## 2017-05-11 PROCEDURE — A6212 FOAM DRG <=16 SQ IN W/BORDER: HCPCS | Performed by: ORTHOPAEDIC SURGERY

## 2017-05-11 PROCEDURE — 77030002922 HC SUT FBRWRE ARTH -B: Performed by: ORTHOPAEDIC SURGERY

## 2017-05-11 PROCEDURE — 74011250636 HC RX REV CODE- 250/636: Performed by: ORTHOPAEDIC SURGERY

## 2017-05-11 PROCEDURE — 77030018790 HC NDL SUT ASPE -A: Performed by: ORTHOPAEDIC SURGERY

## 2017-05-11 PROCEDURE — 77030008190 HC RTVR SUT ARTH S&N -B: Performed by: ORTHOPAEDIC SURGERY

## 2017-05-11 PROCEDURE — 77030018836 HC SOL IRR NACL ICUM -A: Performed by: ORTHOPAEDIC SURGERY

## 2017-05-11 PROCEDURE — 77030034479 HC ADH SKN CLSR PRINEO J&J -B: Performed by: ORTHOPAEDIC SURGERY

## 2017-05-11 PROCEDURE — 77030027355 HC HNDPC IRR SURGLAV STRY -B: Performed by: ORTHOPAEDIC SURGERY

## 2017-05-11 PROCEDURE — 77030020754 HC CUF TRNQT 2BLA STRY -B: Performed by: ORTHOPAEDIC SURGERY

## 2017-05-11 PROCEDURE — 77030012508 HC MSK AIRWY LMA AMBU -A: Performed by: ANESTHESIOLOGY

## 2017-05-11 PROCEDURE — 77030020782 HC GWN BAIR PAWS FLX 3M -B: Performed by: ORTHOPAEDIC SURGERY

## 2017-05-11 PROCEDURE — 87102 FUNGUS ISOLATION CULTURE: CPT | Performed by: ORTHOPAEDIC SURGERY

## 2017-05-11 PROCEDURE — 77030031139 HC SUT VCRL2 J&J -A: Performed by: ORTHOPAEDIC SURGERY

## 2017-05-11 PROCEDURE — L1810 KO ELASTIC WITH JOINTS: HCPCS | Performed by: ORTHOPAEDIC SURGERY

## 2017-05-11 PROCEDURE — 74011000250 HC RX REV CODE- 250

## 2017-05-11 RX ORDER — PROPOFOL 10 MG/ML
INJECTION, EMULSION INTRAVENOUS AS NEEDED
Status: DISCONTINUED | OUTPATIENT
Start: 2017-05-11 | End: 2017-05-11 | Stop reason: HOSPADM

## 2017-05-11 RX ORDER — ACETAMINOPHEN 10 MG/ML
1000 INJECTION, SOLUTION INTRAVENOUS ONCE
Status: COMPLETED | OUTPATIENT
Start: 2017-05-11 | End: 2017-05-11

## 2017-05-11 RX ORDER — SODIUM CHLORIDE 0.9 % (FLUSH) 0.9 %
5-10 SYRINGE (ML) INJECTION EVERY 8 HOURS
Status: DISCONTINUED | OUTPATIENT
Start: 2017-05-11 | End: 2017-05-11 | Stop reason: HOSPADM

## 2017-05-11 RX ORDER — ONDANSETRON 2 MG/ML
INJECTION INTRAMUSCULAR; INTRAVENOUS AS NEEDED
Status: DISCONTINUED | OUTPATIENT
Start: 2017-05-11 | End: 2017-05-11 | Stop reason: HOSPADM

## 2017-05-11 RX ORDER — GLYCOPYRROLATE 0.2 MG/ML
INJECTION INTRAMUSCULAR; INTRAVENOUS AS NEEDED
Status: DISCONTINUED | OUTPATIENT
Start: 2017-05-11 | End: 2017-05-11 | Stop reason: HOSPADM

## 2017-05-11 RX ORDER — SODIUM CHLORIDE 0.9 % (FLUSH) 0.9 %
5-10 SYRINGE (ML) INJECTION AS NEEDED
Status: DISCONTINUED | OUTPATIENT
Start: 2017-05-11 | End: 2017-05-11 | Stop reason: HOSPADM

## 2017-05-11 RX ORDER — FENTANYL CITRATE 50 UG/ML
INJECTION, SOLUTION INTRAMUSCULAR; INTRAVENOUS AS NEEDED
Status: DISCONTINUED | OUTPATIENT
Start: 2017-05-11 | End: 2017-05-11 | Stop reason: HOSPADM

## 2017-05-11 RX ORDER — CEPHALEXIN 500 MG/1
500 CAPSULE ORAL 4 TIMES DAILY
Qty: 4 CAP | Refills: 0 | Status: SHIPPED
Start: 2017-05-11 | End: 2017-05-12

## 2017-05-11 RX ORDER — INSULIN LISPRO 100 [IU]/ML
INJECTION, SOLUTION INTRAVENOUS; SUBCUTANEOUS ONCE
Status: DISCONTINUED | OUTPATIENT
Start: 2017-05-11 | End: 2017-05-11 | Stop reason: HOSPADM

## 2017-05-11 RX ORDER — MIDAZOLAM HYDROCHLORIDE 1 MG/ML
INJECTION, SOLUTION INTRAMUSCULAR; INTRAVENOUS AS NEEDED
Status: DISCONTINUED | OUTPATIENT
Start: 2017-05-11 | End: 2017-05-11 | Stop reason: HOSPADM

## 2017-05-11 RX ORDER — NALOXONE HYDROCHLORIDE 0.4 MG/ML
0.4 INJECTION, SOLUTION INTRAMUSCULAR; INTRAVENOUS; SUBCUTANEOUS AS NEEDED
Status: DISCONTINUED | OUTPATIENT
Start: 2017-05-11 | End: 2017-05-11 | Stop reason: HOSPADM

## 2017-05-11 RX ORDER — FLUMAZENIL 0.1 MG/ML
0.2 INJECTION INTRAVENOUS
Status: DISCONTINUED | OUTPATIENT
Start: 2017-05-11 | End: 2017-05-11 | Stop reason: HOSPADM

## 2017-05-11 RX ORDER — OXYCODONE HYDROCHLORIDE 5 MG/1
5-10 TABLET ORAL
Qty: 60 TAB | Refills: 0 | Status: SHIPPED
Start: 2017-05-11

## 2017-05-11 RX ORDER — MAGNESIUM SULFATE 100 %
4 CRYSTALS MISCELLANEOUS AS NEEDED
Status: DISCONTINUED | OUTPATIENT
Start: 2017-05-11 | End: 2017-05-11 | Stop reason: HOSPADM

## 2017-05-11 RX ORDER — FENTANYL CITRATE 50 UG/ML
50 INJECTION, SOLUTION INTRAMUSCULAR; INTRAVENOUS
Status: DISCONTINUED | OUTPATIENT
Start: 2017-05-11 | End: 2017-05-11 | Stop reason: HOSPADM

## 2017-05-11 RX ORDER — LIDOCAINE HYDROCHLORIDE 20 MG/ML
INJECTION, SOLUTION EPIDURAL; INFILTRATION; INTRACAUDAL; PERINEURAL AS NEEDED
Status: DISCONTINUED | OUTPATIENT
Start: 2017-05-11 | End: 2017-05-11 | Stop reason: HOSPADM

## 2017-05-11 RX ORDER — EPHEDRINE SULFATE/0.9% NACL/PF 25 MG/5 ML
SYRINGE (ML) INTRAVENOUS AS NEEDED
Status: DISCONTINUED | OUTPATIENT
Start: 2017-05-11 | End: 2017-05-11 | Stop reason: HOSPADM

## 2017-05-11 RX ORDER — DEXTROSE 50 % IN WATER (D50W) INTRAVENOUS SYRINGE
25-50 AS NEEDED
Status: DISCONTINUED | OUTPATIENT
Start: 2017-05-11 | End: 2017-05-11 | Stop reason: HOSPADM

## 2017-05-11 RX ORDER — SODIUM CHLORIDE, SODIUM LACTATE, POTASSIUM CHLORIDE, CALCIUM CHLORIDE 600; 310; 30; 20 MG/100ML; MG/100ML; MG/100ML; MG/100ML
125 INJECTION, SOLUTION INTRAVENOUS CONTINUOUS
Status: DISCONTINUED | OUTPATIENT
Start: 2017-05-11 | End: 2017-05-11 | Stop reason: HOSPADM

## 2017-05-11 RX ORDER — BUPIVACAINE HYDROCHLORIDE AND EPINEPHRINE 2.5; 5 MG/ML; UG/ML
INJECTION, SOLUTION EPIDURAL; INFILTRATION; INTRACAUDAL; PERINEURAL AS NEEDED
Status: DISCONTINUED | OUTPATIENT
Start: 2017-05-11 | End: 2017-05-11 | Stop reason: HOSPADM

## 2017-05-11 RX ORDER — ASPIRIN 81 MG/1
81 TABLET ORAL DAILY
COMMUNITY

## 2017-05-11 RX ORDER — CEFAZOLIN SODIUM 2 G/50ML
2 SOLUTION INTRAVENOUS ONCE
Status: COMPLETED | OUTPATIENT
Start: 2017-05-11 | End: 2017-05-11

## 2017-05-11 RX ADMIN — Medication 5 MG: at 11:26

## 2017-05-11 RX ADMIN — FENTANYL CITRATE 25 MCG: 50 INJECTION, SOLUTION INTRAMUSCULAR; INTRAVENOUS at 11:45

## 2017-05-11 RX ADMIN — PROPOFOL 120 MG: 10 INJECTION, EMULSION INTRAVENOUS at 11:20

## 2017-05-11 RX ADMIN — Medication 5 MG: at 11:24

## 2017-05-11 RX ADMIN — CEFAZOLIN SODIUM 2 G: 2 SOLUTION INTRAVENOUS at 11:26

## 2017-05-11 RX ADMIN — ONDANSETRON 4 MG: 2 INJECTION INTRAMUSCULAR; INTRAVENOUS at 11:38

## 2017-05-11 RX ADMIN — ACETAMINOPHEN 1000 MG: 10 INJECTION, SOLUTION INTRAVENOUS at 11:35

## 2017-05-11 RX ADMIN — FENTANYL CITRATE 25 MCG: 50 INJECTION, SOLUTION INTRAMUSCULAR; INTRAVENOUS at 11:20

## 2017-05-11 RX ADMIN — FENTANYL CITRATE 25 MCG: 50 INJECTION, SOLUTION INTRAMUSCULAR; INTRAVENOUS at 11:31

## 2017-05-11 RX ADMIN — LIDOCAINE HYDROCHLORIDE 60 MG: 20 INJECTION, SOLUTION EPIDURAL; INFILTRATION; INTRACAUDAL; PERINEURAL at 11:20

## 2017-05-11 RX ADMIN — SODIUM CHLORIDE, SODIUM LACTATE, POTASSIUM CHLORIDE, AND CALCIUM CHLORIDE: 600; 310; 30; 20 INJECTION, SOLUTION INTRAVENOUS at 12:11

## 2017-05-11 RX ADMIN — SODIUM CHLORIDE, SODIUM LACTATE, POTASSIUM CHLORIDE, AND CALCIUM CHLORIDE 125 ML/HR: 600; 310; 30; 20 INJECTION, SOLUTION INTRAVENOUS at 09:17

## 2017-05-11 RX ADMIN — MIDAZOLAM HYDROCHLORIDE 2 MG: 1 INJECTION, SOLUTION INTRAMUSCULAR; INTRAVENOUS at 11:10

## 2017-05-11 RX ADMIN — FENTANYL CITRATE 25 MCG: 50 INJECTION, SOLUTION INTRAMUSCULAR; INTRAVENOUS at 11:10

## 2017-05-11 RX ADMIN — GLYCOPYRROLATE 0.2 MG: 0.2 INJECTION INTRAMUSCULAR; INTRAVENOUS at 11:10

## 2017-05-11 NOTE — IP AVS SNAPSHOT
[address] [place] [id number] Patient: Lou Gonzalez MRN: [id number] :1933 You are allergic to the following No active allergies Recent Documentation Height Weight BMI Smoking Status 1.676 m 96.1 kg 34.19 kg/m2 Never Smoker Unresulted Labs Order Current Status AFB CULTURE + SMEAR W/RFLX PCR AND ID In process CULTURE, ANAEROBIC In process CULTURE, BODY FLUID W GRAM STAIN In process CULTURE, FUNGUS In process Emergency Contacts Name Discharge Info Relation Home Work Mobile Chelle Hooks DISCHARGE CAREGIVER [3] Spouse [3] [phone] About your hospitalization You were admitted on:  May 11, 2017 [facility] You were discharged on:  May 11, 2017 Unit phone number:    
  
Why you were hospitalized Your primary diagnosis was:  Rupture Of Right Quadriceps Tendon Your diagnoses also included:  Diabetes Mellitus Type 2, Controlled (Hcc), Hyperlipidemia, Htn (Hypertension) Providers Seen During Your Hospitalizations Provider Role Specialty Primary office phone Doretha Nicole MD Attending Provider Orthopedic Surgery [phone] Your Primary Care Physician (PCP) Primary Care Physician Office Phone Office Fax Lanier Mcardle [address] Follow-up Information Follow up With Details Comments Contact Info Becca Mcelroy MD   [address] [place] [address] 
[phone] Current Discharge Medication List  
  
START taking these medications Dose & Instructions Dispensing Information Comments Morning Noon Evening Bedtime  
 cephALEXin 500 mg capsule Commonly known as:  Jessi Acosta Your last dose was:     
   
Your next dose is:    
   
   
 Dose:  500 mg  
 Take 1 Cap by mouth four (4) times daily for 1 day. Quantity:  4 Cap Refills:  0  
     
   
   
   
  
 oxyCODONE IR 5 mg immediate release tablet Commonly known as:  Michaell Lunch Your last dose was: Your next dose is:    
   
   
 Dose:  5-10 mg Take 1-2 Tabs by mouth every four (4) hours as needed for Pain. Max Daily Amount: 60 mg.  
 Quantity:  60 Tab Refills:  0 CONTINUE these medications which have NOT CHANGED Dose & Instructions Dispensing Information Comments Morning Noon Evening Bedtime  
 aspirin delayed-release 81 mg tablet Your last dose was: Your next dose is: Take  by mouth daily. Refills:  0  
     
   
   
   
  
 CRESTOR 10 mg tablet Generic drug:  rosuvastatin Your last dose was: Your next dose is:    
   
   
 Dose:  10 mg Take 10 mg by mouth nightly. Refills:  0  
     
   
   
   
  
 LASIX 20 mg tablet Generic drug:  furosemide Your last dose was: Your next dose is: Take  by mouth daily. Indications: h/o cellulitis Refills:  0 LYRICA 150 mg capsule Generic drug:  pregabalin Your last dose was: Your next dose is: Take  by mouth two (2) times a day. Indications: DIABETIC PERIPHERAL NEUROPATHY Refills:  0 MAVIK 2 mg tablet Generic drug:  trandolapril Your last dose was: Your next dose is:    
   
   
 Dose:  2 mg Take 2 mg by mouth daily. Indications: hypertension Refills:  0  
     
   
   
   
  
 methocarbamol 750 mg tablet Commonly known as:  ROBAXIN Your last dose was: Your next dose is: Take  by mouth three (3) times daily as needed. Refills:  0 NORVASC 5 mg tablet Generic drug:  amLODIPine Your last dose was: Your next dose is:    
   
   
 Dose:  5 mg Take 5 mg by mouth daily. Refills:  0  
     
   
   
   
  
 potassium chloride SR 8 mEq tablet Commonly known as:  KLOR-CON 8 Your last dose was: Your next dose is:    
   
   
 Dose:  8 mEq Take 8 mEq by mouth daily. Refills:  0  
     
   
   
   
  
 tolterodine ER 4 mg ER capsule Commonly known as:  Mimi Sharma Your last dose was: Your next dose is:    
   
   
 Dose:  4 mg Take 4 mg by mouth nightly. Indications: URINARY URGENCY Refills:  0 ZETIA 10 mg tablet Generic drug:  ezetimibe Your last dose was: Your next dose is: Take  by mouth nightly. Refills:  0 STOP taking these medications   
 traMADol 50 mg tablet Commonly known as:  ULTRAM  
   
  
  
  
Where to Get Your Medications Information on where to get these meds will be given to you by the nurse or doctor. ! Ask your nurse or doctor about these medications  
  cephALEXin 500 mg capsule  
 oxyCODONE IR 5 mg immediate release tablet Discharge Instructions DISCHARGE SUMMARY from Nurse The following personal items are in your possession at time of discharge: 
 
Dental Appliances: None Visual Aid: Glasses Home Medications: None Jewelry: None Clothing: Pants, Shirt, Footwear, Undergarments (with spouse) Other Valuables: Eyeglasses (with  spouse) PATIENT INSTRUCTIONS: 
 
 
F-face looks uneven A-arms unable to move or move unevenly S-speech slurred or non-existent T-time-call 911 as soon as signs and symptoms begin-DO NOT go  
 Back to bed or wait to see if you get better-TIME IS BRAIN. Warning Signs of HEART ATTACK Call 911 if you have these symptoms: 
? Chest discomfort. Most heart attacks involve discomfort in the center of the chest that lasts more than a few minutes, or that goes away and comes back. It can feel like uncomfortable pressure, squeezing, fullness, or pain. ? Discomfort in other areas of the upper body. Symptoms can include pain or discomfort in one or both arms, the back, neck, jaw, or stomach. ? Shortness of breath with or without chest discomfort. ? Other signs may include breaking out in a cold sweat, nausea, or lightheadedness. Don't wait more than five minutes to call [address]! Fast action can save your life. Calling 911 is almost always the fastest way to get lifesaving treatment. Emergency Medical Services staff can begin treatment when they arrive  up to an hour sooner than if someone gets to the hospital by car. The discharge information has been reviewed with the patient and caregiver. The patient and caregiver verbalized understanding. Discharge medications reviewed with the patient and caregiver and appropriate educational materials and side effects teaching were provided. Rose Holley III, MD Loree Haley, PA-C Lower Extremity Surgery Discharge Instructions Please take the time to review the following instructions before you leave the hospital and use them as guidelines during your recovery from surgery. If you have any questions you may contact my office at [phone]. Wound Care/Dressing Changes: 
 
   Keep the surgical dressing on for two days from the day of your surgery. Once you remove this, no dressing is necessary if there is no drainage. You may change your dressing as needed. Beginning the 2 days after you are discharged from the hospital you can change your dressing daily.   A big, bulky dressing isn't necessary as long as there isn't any drainage from the incisions. You can put a band-aid or a pice of gauze over each incision and wear an ACE bandage as needed for comfort and swelling. Always replace the immobilizer as soon as the new dressing is in place. Don't remove your dressing or get them wet. · It isn't necessary to apply antibiotic ointment to your incisions. Sutures will be removed at your one week post-op visit. Staples (if you have them) are removed in two weeks. If you have steri-strips over your incision they will start to peel off in 7-10 days as you get them wet. They don't need to be removed prior to that. When they begin to peel off you can remove them. They should all be removed by 14 days from your surgery. If your wound is closed with Dermabond nothing has to be done or removed. Showering/Bathing: You may shower 2 days after surgery. Your dressing may be removed for showering. You may get your incisions wet in the shower. Don't vigorously scrub the area where your incisions are. Apply a clean, dry dressing after drying off the area of your incisions. Don't take a tub bath, get in a swimming pool or Jacuzzi until the incisions are completely healed. Do not soak your incisions under water. You must keep your knee straight at all times. If you do not feel comfortable in the shower without your knee immobilizer, then you must keep it on. If this is the case please cover the knee immobilizer so that it does not get wet. Do not get the dressing wet. Once you remove it two days from surgery, you may get the incision wet if there is no drainage. Weight Bearing Status/Braces/Activity: 
 
   You may walk as tolerated and perform your normal daily activities. Use crutches, a walker, or a cane only if you need them. You should strive to achieve full range of motion in your knee as soon as possible. Please start using a stationary bike or walking for exercise 3 or 4 days after surgery. We would like for you to return to your normal activities as soon as possible. If you feel comfortable returning to work, you may do so at any time. Weight bearing as tolerated with the knee immobilizer in place. Use crutches, cane, or walker as needed. You should sleep in your knee immobilizer. Non-weight bearing. Please do not bear any weight on your leg. You may use your toes for balance when walking with a cane or crutches. Ice/Elevation: 
 
Continue ice and elevation consistently for 48 hours after surgery. When elevating your knee elevate it on about 4 pillows to be sure it is above your heart. After 48 hours, you should ice your knee 3 times per day, for 20 minutes at a time for the next 5 days. After one week from surgery, you may use ice and elevation as needed for pain and swelling. Diet: 
 
You may advance to your regular diet as tolerated. Medication: 
 
1. You will be given a prescription for pain medications when you are discharged from the hospital.  Take the medication as needed according to the directions on the prescription bottle. Possible side effects of the medication include dizziness, headache, nausea, vomiting, constipation and urinary retention. If you experience any of these side effects call the office so that we can assist you in relieving them. Discontinue the use of the pain medication if you develop itching, rash, shortness of breath or difficulties swallowing. If these symptoms become severe or aren't relieved by discontinuing the medication you should seek immediate medical attention. Refills of pain medication are authorized during office hours only (8AM - 5PM Mon thru Fri). 2. If you were prescribed Percocet/oxycodone or Dilaudid/hydromorphone you must have a written prescription. These medications legally cannot be called in to a pharmacy. 3. You may take over the counter Ibuprofen/Advil/Aleve between dosages of your pain medication if needed. Do not take Tylenol in addition to your pain medication as most of the pain medication already contains Tylenol. Exceptions include Dilaudid/hydromorphone, Demerol/meperidine and roxicodone. Do not exceed 3000 mg of Tylenol per day. Ex:  (hydrocodon 5/325mg = 325 mg of Tylenol) 4. If you have had a joint replacement you should take Xarelto 10 mg daily for 28 days from the date of your surgery. This will help to prevent blood clots from forming in your legs. 5. You may resume the medication you were taking prior to your surgery. Pain medication may change the effects of any antidepressant medication. If you have any questions about possible interactions between your regular medications and the pain medication you should consult the physician who prescribes your regular medications. Follow Up Appointment: If you are unsure of your follow-up appointment date and time, please call [phone]. Please let our  know you are scheduling a post-op appointment. Most appointments should be between 7-14 days after your surgery. Physical Therapy: 
 
   Physical therapy will be discussed with you at your first follow-up appointment with Dr. Fritz Levi. You don't need to begin physical therapy prior to that visit. You are to participate with [address] [place] [address] as arranged pre-operatively in the convience of your own home. Physical therapy will be discussed with you at your first follow-up appointment with Dr. Fritz Levi. You don't need to begin physical therapy prior to that visit. If you already have a therapy appointment, please be sure to attend your sessions as scheduled. If you do not have physical therapy scheduled, please call Dr. Fritz Levi' office to set up your first appointment as soon as possible. You do not require Physical Therapy. Important signs and symptoms: 
 
If any of the following signs and symptoms occurs, you should contact Dr. Pretty Noble' office. Please be advised if a problem arises which you feel required immediate medical attention or your are unable to contact Dr. Pretty Noble' office you should seek immediate medical attention. Signs and symptoms to watch for include: 1. A sudden increase in swelling and/or redness or warmth at the area your surgery was performed which isn't relieved by rest, ice and elevation. 2. Oral temperature greater than 101.5 degrees for 12 hours or more which isn't relieved by an increase in fluid intake and taking two Tylenol every 4-6 hours. Do not exceed 3000 mg of Tylenol per day. 3. Excessive drainage from your incisions or drainage that hasn't stopped by 72 hours. 4. Calf pian, tenderness, redness or swelling which isn't relieved with rest and elevation. 5. Fever, chills, shortness of breath, chest pain, nausea, vomiting or other signs and symptoms which are of concern to you. Patient armband removed and shredded Discharge Orders None Introducing [facility]! [facility] introduces [employer] patient portal. Now you can access parts of your medical record, email your doctor's office, and request medication refills online. 1. In your internet browser, go to [email or web address] 2. Click on the First Time User? Click Here link in the Sign In box. You will see the New Member Sign Up page. 3. Enter your [employer] Access Code exactly as it appears below. You will not need to use this code after youve completed the sign-up process. If you do not sign up before the expiration date, you must request a new code. · [employer] Access Code: [email or web address] Expires: 6/27/2017  2:51 PM 
 
4. Enter the last four digits of your Social Security Number (xxxx) and Date of Birth (mm/dd/yyyy) as indicated and click Submit. You will be taken to the next sign-up page. 5. Create a [employer] ID. This will be your [employer] login ID and cannot be changed, so think of one that is secure and easy to remember. 6. Create a [employer] password. You can change your password at any time. 7. Enter your Password Reset Question and Answer. This can be used at a later time if you forget your password. 8. Enter your e-mail address. You will receive e-mail notification when new information is available in [address] 9. Click Sign Up. You can now view and download portions of your medical record. 10. Click the Download Summary menu link to download a portable copy of your medical information. If you have questions, please visit the Frequently Asked Questions section of the [employer] website. Remember, [employer] is NOT to be used for urgent needs. For medical emergencies, dial 911. Now available from your iPhone and Android! General Information Please provide this summary of care documentation to your next provider. Patient Signature:  ____________________________________________________________ Date:  ____________________________________________________________  
  
Chaz Cantrell Provider Signature:  ____________________________________________________________ Date:  ____________________________________________________________

## 2017-05-11 NOTE — OP NOTES
[address] [place] [address]  OPERATIVE REPORT    Name:  Chio Chavarria  MR#:  [id number]  :  1933  Account #:  [de-identified]  Date of Adm:  2017  Date of Surgery:  2017      PREOPERATIVE DIAGNOSES  1. Right knee chronic quadriceps rupture, status post right total knee  arthroplasty. 2. Medial and lateral collateral laxity. 3. Questionable joint infection. POSTOPERATIVE DIAGNOSES  1. Right knee chronic quadriceps rupture, status post right total knee  arthroplasty. 2. Medial and lateral collateral laxity. 3. Questionable joint infection. PROCEDURES PERFORMED: Right knee open quadriceps tendon  repair. SURGEON: Terell Aragon MD    FIRST ASSISTANT: Kathleen Garcia. KENDALL Roque    ESTIMATED BLOOD LOSS: 15cc    SPECIMENS REMOVED: none    ANESTHESIA: General.    COMPLICATIONS: None. TOURNIQUET TIME: Approximately 45 minutes at 350 mmHg. INDICATIONS: The patient is an 78-year-old white male status post a  right TKA done several years ago by Dr. Jasmin March. He had a partial  tear of his quadriceps last summer and then just recently completed it  to the point that he has a lack of terminal extension. He now presents  for surgical correction. DESCRIPTION OF PROCEDURE: The patient was brought to the  operating theater and after adequate anesthesia the right knee was  prepped and draped in the typical sterile fashion. The findings showed  that he had slight hyperextension. He had medial and lateral collateral  laxity in extension, mid flexion and at flexion. The patient had an  obvious defect in the quad that was palpable just like the office. Once  the limb was exsanguinated, the incision was then made from the  quadriceps tendon above down to just below the patella. The incision  was probably 6 inches in length. The incision was taken down to the  quad. I exposed the vastus lateralis along with the vastus medialis and  kept the tendon clear in between.  You could feel the soft tendon. It was  about 4 cm defect. I excised this from the patella, all the way up to a  good quad tendon. I then used a Krackow stitch with a #2 FiberWire  weaving it at the edge of the quad tendon and all the way up to the end  of the quad tendon beside the vastus medialis and vastus lateralis. I  wove this over an area about 5 cm. I did another stitch identical to this  parallel to it about the width of the patella and then coming down as I  went superiorly into the smaller tendon itself. There was actually  excellent purchase within this tendon. I could pull the tendon down to  the superior pole of patella. It had surprising flexibility for a chronic  issue. The patella was then prepared on its superior extent and  debrided back to a good bleeding surface. The patella looked like it  was still well fixed. When I first entered the joint, there was a cloudy  serous fluid that was seen. It did not really looked truly infected but  was definitely inflamed, so I sent this all for culture and sensitivity just  to confirm if there is a deeper infection that may have been there  chronically. The suprapatellar was then cleaned down to a good bony  edge and then using an appropriate sized drill bit for the suture  retriever, I drilled 3 drill holes equidistant apart in the midportion of the  patella near the prosthetic cement interface. I passed the suture  retriever from distal to proximal on the outside 2 stitches and brought  the outside stitch distal and then passed through the central drill hole  from proximal to distal and brought both of these up through the central  bony tunnel. I used a towel clip to hold the tendon to the patella itself  and tied each one individually with surgeon's knot and 5 alternating  half hitches in alternating posts. This was done for both with good  repair.  I oversewed the edges of the retinaculum with interrupted  figure-of-eight #1 Vicryl and oversewed the repair with the FiberWire. The patient could get to about 40 degrees of knee bend before there  was tension on the repair. The skin was then closed with inverted 2-0  Vicryl and then sealed with Dermabond Prineo skin system. Some 3-0  Monocryl were placed in subcuticular area just for any opening spaces. Approximately 30 mL of 0.25% Marcaine with epinephrine was injected  in the skin and around the tendon itself. This was then dressed with  Mepilex, 4 x 4's, and an Ace wrap from the toes to mid thigh. The  patient was put in a range of motion brace locked in extension and  returned to the recovery room awake, in stable condition. All  instrument, sponge and needle counts were correct. MD Kevin Andersen / Zena Wheatley  D:  05/11/2017   12:21  T:  05/11/2017   13:19  Job #:  [id number]

## 2017-05-11 NOTE — DISCHARGE INSTRUCTIONS
DISCHARGE SUMMARY from Nurse    The following personal items are in your possession at time of discharge:    Dental Appliances: None  Visual Aid: Glasses     Home Medications: None  Jewelry: None  Clothing: Pants, Shirt, Footwear, Undergarments (with spouse)  Other Valuables: Eyeglasses (with  spouse)             PATIENT INSTRUCTIONS:    After general anesthesia or intravenous sedation, for 24 hours or while taking prescription Narcotics:  · Limit your activities  · Do not drive and operate hazardous machinery  · Do not make important personal or business decisions  · Do  not drink alcoholic beverages  · If you have not urinated within 8 hours after discharge, please contact your surgeon on call. Report the following to your surgeon:  · Excessive pain, swelling, redness or odor of or around the surgical area  · Temperature over 100.5  · Nausea and vomiting lasting longer than 4 hours or if unable to take medications  · Any signs of decreased circulation or nerve impairment to extremity: change in color, persistent  numbness, tingling, coldness or increase pain  · Any questions        What to do at Home:  Recommended activity: Activity as tolerated and no driving for today, Ambulate in house, No lifting, Driving, or Strenuous exercise until advised and No driving while on analgesics. If you experience any of the following symptoms as above, please follow up with Dr. Raad Hunt. *  Please give a list of your current medications to your Primary Care Provider. *  Please update this list whenever your medications are discontinued, doses are      changed, or new medications (including over-the-counter products) are added. *  Please carry medication information at all times in case of emergency situations.           These are general instructions for a healthy lifestyle:    No smoking/ No tobacco products/ Avoid exposure to second hand smoke    Surgeon General's Warning:  Quitting smoking now greatly reduces serious risk to your health. Obesity, smoking, and sedentary lifestyle greatly increases your risk for illness    A healthy diet, regular physical exercise & weight monitoring are important for maintaining a healthy lifestyle    You may be retaining fluid if you have a history of heart failure or if you experience any of the following symptoms:  Weight gain of 3 pounds or more overnight or 5 pounds in a week, increased swelling in our hands or feet or shortness of breath while lying flat in bed. Please call your doctor as soon as you notice any of these symptoms; do not wait until your next office visit. Recognize signs and symptoms of STROKE:    F-face looks uneven    A-arms unable to move or move unevenly    S-speech slurred or non-existent    T-time-call 911 as soon as signs and symptoms begin-DO NOT go       Back to bed or wait to see if you get better-TIME IS BRAIN. Warning Signs of HEART ATTACK     Call 911 if you have these symptoms:   Chest discomfort. Most heart attacks involve discomfort in the center of the chest that lasts more than a few minutes, or that goes away and comes back. It can feel like uncomfortable pressure, squeezing, fullness, or pain.  Discomfort in other areas of the upper body. Symptoms can include pain or discomfort in one or both arms, the back, neck, jaw, or stomach.  Shortness of breath with or without chest discomfort.  Other signs may include breaking out in a cold sweat, nausea, or lightheadedness. Don't wait more than five minutes to call 911 - MINUTES MATTER! Fast action can save your life. Calling 911 is almost always the fastest way to get lifesaving treatment. Emergency Medical Services staff can begin treatment when they arrive -- up to an hour sooner than if someone gets to the hospital by car. The discharge information has been reviewed with the patient and caregiver. The patient and caregiver verbalized understanding.     Discharge medications reviewed with the patient and caregiver and appropriate educational materials and side effects teaching were provided. John Laughlin III, MD Mellissa Reas, PA-C    Lower Extremity Surgery  Discharge Instructions      Please take the time to review the following instructions before you leave the hospital and use them as guidelines during your recovery from surgery. If you have any questions you may contact my office at [phone]. Wound Care/Dressing Changes:    []   Keep the surgical dressing on for two days from the day of your surgery. Once you remove this, no dressing is necessary if there is no drainage. [x]   You may change your dressing as needed. Beginning the 2 days after you are discharged from the hospital you can change your dressing daily. A big, bulky dressing isn't necessary as long as there isn't any drainage from the incisions. You can put a band-aid or a pice of gauze over each incision and wear an ACE bandage as needed for comfort and swelling. Always replace the immobilizer as soon as the new dressing is in place. []   Don't remove your dressing or get them wet. · It isn't necessary to apply antibiotic ointment to your incisions. Sutures will be removed at your one week post-op visit. Staples (if you have them) are removed in two weeks. If you have steri-strips over your incision they will start to peel off in 7-10 days as you get them wet. They don't need to be removed prior to that. When they begin to peel off you can remove them. They should all be removed by 14 days from your surgery. If your wound is closed with Dermabond nothing has to be done or removed. Showering/Bathing:    [x]   You may shower 2 days after surgery. Your dressing may be removed for showering. You may get your incisions wet in the shower. Don't vigorously scrub the area where your incisions are. Apply a clean, dry dressing after drying off the area of your incisions. Don't take a tub bath, get in a swimming pool or Jacuzzi until the incisions are completely healed. Do not soak your incisions under water. You must keep your knee straight at all times. If you do not feel comfortable in the shower without your knee immobilizer, then you must keep it on. If this is the case please cover the knee immobilizer so that it does not get wet. []   Do not get the dressing wet. Once you remove it two days from surgery, you may get the incision wet if there is no drainage. Weight Bearing Status/Braces/Activity:    []   You may walk as tolerated and perform your normal daily activities. Use crutches, a walker, or a cane only if you need them. You should strive to achieve full range of motion in your knee as soon as possible. Please start using a stationary bike or walking for exercise 3 or 4 days after surgery. We would like for you to return to your normal activities as soon as possible. If you feel comfortable returning to work, you may do so at any time. [x]   Weight bearing as tolerated with the knee immobilizer in place. Use crutches, cane, or walker as needed. You should sleep in your knee immobilizer. []   Non-weight bearing. Please do not bear any weight on your leg. You may use your toes for balance when walking with a cane or crutches. Ice/Elevation:    Continue ice and elevation consistently for 48 hours after surgery. When elevating your knee elevate it on about 4 pillows to be sure it is above your heart. After 48 hours, you should ice your knee 3 times per day, for 20 minutes at a time for the next 5 days. After one week from surgery, you may use ice and elevation as needed for pain and swelling. Diet:    You may advance to your regular diet as tolerated. Medication:    1.  You will be given a prescription for pain medications when you are discharged from the hospital.  Take the medication as needed according to the directions on the prescription bottle. Possible side effects of the medication include dizziness, headache, nausea, vomiting, constipation and urinary retention. If you experience any of these side effects call the office so that we can assist you in relieving them. Discontinue the use of the pain medication if you develop itching, rash, shortness of breath or difficulties swallowing. If these symptoms become severe or aren't relieved by discontinuing the medication you should seek immediate medical attention. Refills of pain medication are authorized during office hours only (8AM - 5PM Mon thru Fri). 2. If you were prescribed Percocet/oxycodone or Dilaudid/hydromorphone you must have a written prescription. These medications legally cannot be called in to a pharmacy. 3. You may take over the counter Ibuprofen/Advil/Aleve between dosages of your pain medication if needed. Do not take Tylenol in addition to your pain medication as most of the pain medication already contains Tylenol. Exceptions include Dilaudid/hydromorphone, Demerol/meperidine and roxicodone. Do not exceed 3000 mg of Tylenol per day. Ex:  (hydrocodon 5/325mg = 325 mg of Tylenol)   4. If you have had a joint replacement you should take Xarelto 10 mg daily for 28 days from the date of your surgery. This will help to prevent blood clots from forming in your legs. 5. You may resume the medication you were taking prior to your surgery. Pain medication may change the effects of any antidepressant medication. If you have any questions about possible interactions between your regular medications and the pain medication you should consult the physician who prescribes your regular medications. Follow Up Appointment:    If you are unsure of your follow-up appointment date and time, please call [phone]. Please let our  know you are scheduling a post-op appointment. Most appointments should be between 7-14 days after your surgery.     Physical Therapy:    []   Physical therapy will be discussed with you at your first follow-up appointment with Dr. Erica Schmidt. You don't need to begin physical therapy prior to that visit. You are to participate with [address] [place] [address] as arranged pre-operatively in the convience of your own home. [x]   Physical therapy will be discussed with you at your first follow-up appointment with Dr. Erica Schmidt. You don't need to begin physical therapy prior to that visit. []   If you already have a therapy appointment, please be sure to attend your sessions as scheduled. If you do not have physical therapy scheduled, please call Dr. Erica Schmidt' office to set up your first appointment as soon as possible.    []   You do not require Physical Therapy. Important signs and symptoms:    If any of the following signs and symptoms occurs, you should contact Dr. Erica Schmidt' office. Please be advised if a problem arises which you feel required immediate medical attention or your are unable to contact Dr. Erica Schmidt' office you should seek immediate medical attention. Signs and symptoms to watch for include:  1. A sudden increase in swelling and/or redness or warmth at the area your surgery was performed which isn't relieved by rest, ice and elevation. 2. Oral temperature greater than 101.5 degrees for 12 hours or more which isn't relieved by an increase in fluid intake and taking two Tylenol every 4-6 hours. Do not exceed 3000 mg of Tylenol per day. 3. Excessive drainage from your incisions or drainage that hasn't stopped by 72 hours. 4. Calf pian, tenderness, redness or swelling which isn't relieved with rest and elevation. 5. Fever, chills, shortness of breath, chest pain, nausea, vomiting or other signs and symptoms which are of concern to you.     Patient armband removed and shredded

## 2017-05-11 NOTE — PERIOP NOTES
Verbal SBAR to WAYLON Espinoza, phase two level of care. Also spoke with Devika Rodríguez and advised that knee brace needs to be on at all times, for 6 weeks, may remove dressing in 2days but knee immobilizer must stay in place, in extended locked position.

## 2017-05-11 NOTE — INTERVAL H&P NOTE
H&P Update:  Lou Gonzalez was seen and examined. History and physical has been reviewed. The patient has been examined. There have been no significant clinical changes since the completion of the originally dated History and Physical.  Patient identified by surgeon; surgical site was confirmed by patient and surgeon.     Signed By: Doretha Nicole MD     May 11, 2017 9:45 AM

## 2017-05-11 NOTE — ANESTHESIA POSTPROCEDURE EVALUATION
Post-Anesthesia Evaluation & Assessment    Visit Vitals    /51    Pulse 85    Temp 37.2 °C (99 °F)    Resp 17    Ht 5' 6\" (1.676 m)    Wt 96.1 kg (211 lb 12.8 oz)    SpO2 95%    BMI 34.19 kg/m2       Nausea/Vomiting: Controlled. Post-operative hydration adequate. Pain Scale 1: Numeric (0 - 10) (05/11/17 1321)  Pain Intensity 1: 0 (05/11/17 1321)   Managed    Pain score at or below stated goal level. Mental status & Level of consciousness: alert and oriented x 3    Neurological status: moves all extremities, sensation grossly intact    Pulmonary status: airway patent, adequate oxygenation. Complications related to anesthesia: none    Patient has met all PACU discharge requirements.       Nba Aguilar DO

## 2017-05-11 NOTE — ANESTHESIA PREPROCEDURE EVALUATION
Anesthetic History   No history of anesthetic complications            Review of Systems / Medical History  Patient summary reviewed, nursing notes reviewed and pertinent labs reviewed    Pulmonary        Sleep apnea: No treatment      Pertinent negatives: No COPD, asthma, recent URI and smoker     Neuro/Psych   Within defined limits           Cardiovascular    Hypertension: well controlled          Hyperlipidemia  Pertinent negatives: No past MI, CAD, PAD, dysrhythmias, angina and CHF  Exercise tolerance: >4 METS     GI/Hepatic/Renal  Within defined limits              Endo/Other    Diabetes    Obesity and arthritis  Pertinent negatives: No hypothyroidism, hyperthyroidism, morbid obesity and blood dyscrasia   Other Findings              Physical Exam    Airway  Mallampati: III  TM Distance: 4 - 6 cm  Neck ROM: decreased range of motion   Mouth opening: Normal     Cardiovascular  Regular rate and rhythm,  S1 and S2 normal,  no murmur, click, rub, or gallop             Dental         Pulmonary  Breath sounds clear to auscultation               Abdominal  GI exam deferred       Other Findings            Anesthetic Plan    ASA: 2  Anesthesia type: general          Induction: Intravenous  Anesthetic plan and risks discussed with: Patient and Spouse      GA/LMA

## 2017-05-16 LAB
BACTERIA SPEC CULT: NORMAL
SERVICE CMNT-IMP: NORMAL

## 2017-05-17 LAB
BACTERIA SPEC CULT: NORMAL
GRAM STN SPEC: NORMAL
GRAM STN SPEC: NORMAL
SERVICE CMNT-IMP: NORMAL

## 2017-06-12 LAB
BACTERIA SPEC CULT: NORMAL
FUNGUS SMEAR,FNGSMR: NORMAL
SERVICE CMNT-IMP: NORMAL

## 2017-06-27 LAB
ACID FAST STN SPEC: NEGATIVE
MYCOBACTERIUM SPEC QL CULT: NEGATIVE
SPECIMEN PREPARATION: NORMAL
SPECIMEN SOURCE: NORMAL

## 2017-11-16 ENCOUNTER — APPOINTMENT (OUTPATIENT)
Dept: MRI IMAGING | Age: 82
End: 2017-11-16
Attending: EMERGENCY MEDICINE
Payer: MEDICARE

## 2017-11-16 ENCOUNTER — APPOINTMENT (OUTPATIENT)
Dept: GENERAL RADIOLOGY | Age: 82
End: 2017-11-16
Attending: EMERGENCY MEDICINE
Payer: MEDICARE

## 2017-11-16 ENCOUNTER — HOSPITAL ENCOUNTER (EMERGENCY)
Age: 82
Discharge: ANOTHER HOSPITAL | End: 2017-11-16
Attending: EMERGENCY MEDICINE
Payer: MEDICARE

## 2017-11-16 VITALS
HEIGHT: 66 IN | DIASTOLIC BLOOD PRESSURE: 55 MMHG | OXYGEN SATURATION: 97 % | WEIGHT: 213 LBS | SYSTOLIC BLOOD PRESSURE: 124 MMHG | HEART RATE: 57 BPM | TEMPERATURE: 97.9 F | BODY MASS INDEX: 34.23 KG/M2 | RESPIRATION RATE: 10 BRPM

## 2017-11-16 DIAGNOSIS — M48.04 SPINAL STENOSIS OF THORACIC REGION: ICD-10-CM

## 2017-11-16 DIAGNOSIS — G95.20 SPINAL CORD COMPRESSION (HCC): Primary | ICD-10-CM

## 2017-11-16 DIAGNOSIS — R29.898 WEAKNESS OF BOTH LOWER EXTREMITIES: ICD-10-CM

## 2017-11-16 DIAGNOSIS — R33.9 URINARY RETENTION: ICD-10-CM

## 2017-11-16 LAB
ALBUMIN SERPL-MCNC: 2.7 G/DL (ref 3.4–5)
ALBUMIN/GLOB SERPL: 0.6 {RATIO} (ref 0.8–1.7)
ALP SERPL-CCNC: 113 U/L (ref 45–117)
ALT SERPL-CCNC: 56 U/L (ref 16–61)
ANION GAP SERPL CALC-SCNC: 8 MMOL/L (ref 3–18)
APPEARANCE UR: CLEAR
AST SERPL-CCNC: 97 U/L (ref 15–37)
BASOPHILS # BLD: 0 K/UL (ref 0–0.06)
BASOPHILS NFR BLD: 0 % (ref 0–2)
BILIRUB SERPL-MCNC: 0.6 MG/DL (ref 0.2–1)
BILIRUB UR QL: NEGATIVE
BNP SERPL-MCNC: 165 PG/ML (ref 0–1800)
BUN SERPL-MCNC: 27 MG/DL (ref 7–18)
BUN/CREAT SERPL: 20 (ref 12–20)
CALCIUM SERPL-MCNC: 9.4 MG/DL (ref 8.5–10.1)
CHLORIDE SERPL-SCNC: 102 MMOL/L (ref 100–108)
CO2 SERPL-SCNC: 31 MMOL/L (ref 21–32)
COLOR UR: YELLOW
CREAT SERPL-MCNC: 1.38 MG/DL (ref 0.6–1.3)
DIFFERENTIAL METHOD BLD: ABNORMAL
EOSINOPHIL # BLD: 0 K/UL (ref 0–0.4)
EOSINOPHIL NFR BLD: 0 % (ref 0–5)
ERYTHROCYTE [DISTWIDTH] IN BLOOD BY AUTOMATED COUNT: 15.1 % (ref 11.6–14.5)
GLOBULIN SER CALC-MCNC: 4.9 G/DL (ref 2–4)
GLUCOSE SERPL-MCNC: 159 MG/DL (ref 74–99)
GLUCOSE UR STRIP.AUTO-MCNC: NEGATIVE MG/DL
HCT VFR BLD AUTO: 37.7 % (ref 36–48)
HGB BLD-MCNC: 11.9 G/DL (ref 13–16)
HGB UR QL STRIP: NEGATIVE
INR PPP: 1 (ref 0.8–1.2)
KETONES UR QL STRIP.AUTO: NEGATIVE MG/DL
LEUKOCYTE ESTERASE UR QL STRIP.AUTO: NEGATIVE
LYMPHOCYTES # BLD: 1.3 K/UL (ref 0.9–3.6)
LYMPHOCYTES NFR BLD: 18 % (ref 21–52)
MAGNESIUM SERPL-MCNC: 1.7 MG/DL (ref 1.6–2.6)
MCH RBC QN AUTO: 27.7 PG (ref 24–34)
MCHC RBC AUTO-ENTMCNC: 31.6 G/DL (ref 31–37)
MCV RBC AUTO: 87.9 FL (ref 74–97)
MONOCYTES # BLD: 0.6 K/UL (ref 0.05–1.2)
MONOCYTES NFR BLD: 8 % (ref 3–10)
NEUTS SEG # BLD: 5.5 K/UL (ref 1.8–8)
NEUTS SEG NFR BLD: 74 % (ref 40–73)
NITRITE UR QL STRIP.AUTO: NEGATIVE
PH UR STRIP: 5 [PH] (ref 5–8)
PLATELET # BLD AUTO: 156 K/UL (ref 135–420)
PMV BLD AUTO: 10 FL (ref 9.2–11.8)
POTASSIUM SERPL-SCNC: 4.7 MMOL/L (ref 3.5–5.5)
PROT SERPL-MCNC: 7.6 G/DL (ref 6.4–8.2)
PROT UR STRIP-MCNC: NEGATIVE MG/DL
PROTHROMBIN TIME: 13 SEC (ref 11.5–15.2)
RBC # BLD AUTO: 4.29 M/UL (ref 4.7–5.5)
SODIUM SERPL-SCNC: 141 MMOL/L (ref 136–145)
SP GR UR REFRACTOMETRY: 1.01 (ref 1–1.03)
TROPONIN I SERPL-MCNC: <0.02 NG/ML (ref 0–0.06)
UROBILINOGEN UR QL STRIP.AUTO: 1 EU/DL (ref 0.2–1)
WBC # BLD AUTO: 7.4 K/UL (ref 4.6–13.2)

## 2017-11-16 PROCEDURE — 81003 URINALYSIS AUTO W/O SCOPE: CPT | Performed by: EMERGENCY MEDICINE

## 2017-11-16 PROCEDURE — 71010 XR CHEST PORT: CPT

## 2017-11-16 PROCEDURE — 83735 ASSAY OF MAGNESIUM: CPT | Performed by: EMERGENCY MEDICINE

## 2017-11-16 PROCEDURE — 85025 COMPLETE CBC W/AUTO DIFF WBC: CPT | Performed by: EMERGENCY MEDICINE

## 2017-11-16 PROCEDURE — 72148 MRI LUMBAR SPINE W/O DYE: CPT

## 2017-11-16 PROCEDURE — 99285 EMERGENCY DEPT VISIT HI MDM: CPT

## 2017-11-16 PROCEDURE — 83880 ASSAY OF NATRIURETIC PEPTIDE: CPT | Performed by: EMERGENCY MEDICINE

## 2017-11-16 PROCEDURE — 84484 ASSAY OF TROPONIN QUANT: CPT | Performed by: EMERGENCY MEDICINE

## 2017-11-16 PROCEDURE — 85610 PROTHROMBIN TIME: CPT | Performed by: EMERGENCY MEDICINE

## 2017-11-16 PROCEDURE — 96360 HYDRATION IV INFUSION INIT: CPT

## 2017-11-16 PROCEDURE — 80053 COMPREHEN METABOLIC PANEL: CPT | Performed by: EMERGENCY MEDICINE

## 2017-11-16 PROCEDURE — 74011250636 HC RX REV CODE- 250/636: Performed by: EMERGENCY MEDICINE

## 2017-11-16 PROCEDURE — 93970 EXTREMITY STUDY: CPT

## 2017-11-16 RX ORDER — SODIUM CHLORIDE 9 MG/ML
100 INJECTION, SOLUTION INTRAVENOUS CONTINUOUS
Status: DISCONTINUED | OUTPATIENT
Start: 2017-11-16 | End: 2017-11-16 | Stop reason: HOSPADM

## 2017-11-16 RX ADMIN — SODIUM CHLORIDE 100 ML/HR: 900 INJECTION, SOLUTION INTRAVENOUS at 18:51

## 2017-11-16 NOTE — PROCEDURES
[facility]  *** FINAL REPORT ***    Name: Louis Birch  MRN: [id number]    Outpatient  : 27 Dec 1933  HIS Order #: [id number]  [address] [facility] Visit #: [id number]  Date: 2017    TYPE OF TEST: Peripheral Venous Testing    REASON FOR TEST  Limb swelling    Right Leg:-  Deep venous thrombosis:           No  Superficial venous thrombosis:    No  Deep venous insufficiency:        Not examined  Superficial venous insufficiency: Not examined    Left Leg:-  Deep venous thrombosis:           No  Superficial venous thrombosis:    No  Deep venous insufficiency:        Not examined  Superficial venous insufficiency: Not examined      INTERPRETATION/FINDINGS  Duplex images were obtained using 2-D gray scale, color flow, and  spectral Doppler analysis. Right leg :  1. Deep vein(s) visualized include the common femoral, deep femoral,  proximal femoral, mid femoral, distal femoral, popliteal(above knee),  popliteal(fossa) and popliteal(below knee) veins. 2. No evidence of deep venous thrombosis detected in the veins  visualized. 3. Superficial vein(s) visualized include the great saphenous vein. 4. No evidence of superficial thrombosis detected. 5. Prominent lymph node noted in right groin measuring 2.4 x 0.8 x 1.8    Left leg :  1. Deep vein(s) visualized include the common femoral, deep femoral,  proximal femoral, mid femoral, distal femoral, popliteal(above knee),  popliteal(fossa) and popliteal(below knee) veins. 2. No evidence of deep venous thrombosis detected in the veins  visualized. 3. Superficial vein(s) visualized include the great saphenous vein. 4. No evidence of superficial thrombosis detected. ADDITIONAL COMMENTS    I have personally reviewed the data relevant to the interpretation of  this  study. TECHNOLOGIST: [employer]  Signed: 2017 01:12 PM    PHYSICIAN: Jim Jackson.  Caro Reyna MD  Signed: 2017 08:54 AM

## 2017-11-16 NOTE — ED PROVIDER NOTES
[address]  EMERGENCY DEPARTMENT HISTORY AND PHYSICAL EXAM       Date: 11/16/2017   Patient Name: Judi Thapa   YOB: 1933  Medical Record Number: [id number]    History of Presenting Illness       Chief Complaint   Patient presents with    Fall    Leg Swelling     donnie leg and feet  weakness and swelling        History Provided By:  patient and EMS personnel    Additional History: 11:40 AM  Judi Thapa is a 80 y.o. 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 Associated sxs include urinary incontinence (wears adult diaper), and difficulty walking. States he is currently unable to get out of bed on his own. Generally able to ambulate, but has been needing his sons assistance to walk to and from bathroom today. When trying on his own, has slid down to floor due to weakness. Has been seen by PCP is scheduled for an MRI of lumbar spine by Dr. Humera Bridges (pain management). LBM was 4-5 days ago. Pshx of lumbar spine surgery due to spinal stenosis (last 5 years ago) and knee replacement. Denies bowel incontinence, new numbness/tingling, new swelling to BLE, CP, SOB, back pain, and any other Sx or complaints. Primary Care Provider: Maya Villeda MD   Specialist: Armando Burgos. Humera Bridges M.D. (Pain Management) and Roselyn Bowers DO (Orthopedic Surgeon)    Past History     Past Medical History:   Past Medical History:   Diagnosis Date    Arthritis     Diabetes ([address] 2005    diet controlled    Hypercholesteremia     Hypertension 2005    Sleep apnea     no treatment    Urinary urgency         Past Surgical History:   Past Surgical History:   Procedure Laterality Date    HX APPENDECTOMY      HX BACK SURGERY      HX CATARACT REMOVAL      bilateral    HX VASECTOMY          Family History:   History reviewed. No pertinent family history.      Social History:   Social History   Substance Use Topics    Smoking status: Never Smoker    Smokeless tobacco: Never Used    Alcohol use 1.2 oz/week     1 Glasses of wine, 1 Cans of beer per week      Comment: twice a month        Allergies: Allergies   Allergen Reactions    Oxycodone Other (comments)     :makes him crazy per wife        Review of Systems   Review of Systems   Constitutional: Negative for chills and fever. Respiratory: Negative for shortness of breath. Cardiovascular: Negative for chest pain and leg swelling. Genitourinary: Positive for difficulty urinating (incontinence). Musculoskeletal: Positive for gait problem. Negative for back pain. Neurological: Positive for weakness. Negative for numbness. All other systems reviewed and are negative. Physical Exam  Vitals:    11/16/17 1700 11/16/17 1715 11/16/17 1730 11/16/17 1745   BP: 118/58 139/65 138/61 137/51   Pulse: (!) 59 70 62 (!) 59   Resp: 12 18 14 15   Temp:       SpO2:   100% 100%   Weight:       Height:           Physical Exam   Nursing note and vitals reviewed. Constitutional: Alert. Well appearing, no acute distress  Head: Normocephalic, Atraumatic  Eyes: Pupils are equal, round, and reactive to light, EOMI  ENT: Moist mucous membranes, oropharynx clear. Neck: Supple, non-tender  Cardiovascular: Regular rate and rhythm, no murmurs, rubs, or gallops  Chest: Normal work of breathing and chest excursion bilaterally  Lungs: Clear to ausculation bilaterally  Abdomen: Soft, non tender, non distended, normoactive bowel sounds  Rectal: Has normal perineal sensation, normal rectal tone  Back: No midline spine tenderness. No evidence of trauma or deformity. No CVA Tenderness. Extremities: No evidence of trauma or deformity. 3+ pitting edema to the mid-thigh on right. 2+ pitting edema to mid-thigh on left. No tenderness of the calves bilaterally. Skin: Warm and dry  Neuro: Alert and appropriate.  Diminished sensation of bilateral legs in stocking glove distribution. 2/5 strength with flexion of the right hip and extension of right knee. 3/5 strength with flexion of left hip and extension of left knee. 4+/5 strength bilaterally with dorsi and plantar flexion of the feet. Withdraws both feet to painful stimuli but unable to lift them from the bed. Facial movement symmetric, normal speech, normal coordination. Psychiatric: Normal mood and affect       Diagnostic Study Results     Labs -      Recent Results (from the past 12 hour(s))   CBC WITH AUTOMATED DIFF    Collection Time: 11/16/17 12:18 PM   Result Value Ref Range    WBC 7.4 4.6 - 13.2 K/uL    RBC 4.29 (L) 4.70 - 5.50 M/uL    HGB 11.9 (L) 13.0 - 16.0 g/dL    HCT 37.7 36.0 - 48.0 %    MCV 87.9 74.0 - 97.0 FL    MCH 27.7 24.0 - 34.0 PG    MCHC 31.6 31.0 - 37.0 g/dL    RDW 15.1 (H) 11.6 - 14.5 %    PLATELET 065 570 - 873 K/uL    MPV 10.0 9.2 - 11.8 FL    NEUTROPHILS 74 (H) 40 - 73 %    LYMPHOCYTES 18 (L) 21 - 52 %    MONOCYTES 8 3 - 10 %    EOSINOPHILS 0 0 - 5 %    BASOPHILS 0 0 - 2 %    ABS. NEUTROPHILS 5.5 1.8 - 8.0 K/UL    ABS. LYMPHOCYTES 1.3 0.9 - 3.6 K/UL    ABS. MONOCYTES 0.6 0.05 - 1.2 K/UL    ABS. EOSINOPHILS 0.0 0.0 - 0.4 K/UL    ABS.  BASOPHILS 0.0 0.0 - 0.06 K/UL    DF AUTOMATED     PROTHROMBIN TIME + INR    Collection Time: 11/16/17 12:18 PM   Result Value Ref Range    Prothrombin time 13.0 11.5 - 15.2 sec    INR 1.0 0.8 - 1.2     MAGNESIUM    Collection Time: 11/16/17 12:18 PM   Result Value Ref Range    Magnesium 1.7 1.6 - 2.6 mg/dL   METABOLIC PANEL, COMPREHENSIVE    Collection Time: 11/16/17 12:18 PM   Result Value Ref Range    Sodium 141 136 - 145 mmol/L    Potassium 4.7 3.5 - 5.5 mmol/L    Chloride 102 100 - 108 mmol/L    CO2 31 21 - 32 mmol/L    Anion gap 8 3.0 - 18 mmol/L    Glucose 159 (H) 74 - 99 mg/dL    BUN 27 (H) 7.0 - 18 MG/DL    Creatinine 1.38 (H) 0.6 - 1.3 MG/DL    BUN/Creatinine ratio 20 12 - 20      GFR est AA 60 (L) >60 ml/min/1.73m2    GFR est non-AA 49 (L) >60 ml/min/1.73m2 Calcium 9.4 8.5 - 10.1 MG/DL    Bilirubin, total 0.6 0.2 - 1.0 MG/DL    ALT (SGPT) 56 16 - 61 U/L    AST (SGOT) 97 (H) 15 - 37 U/L    Alk. phosphatase 113 45 - 117 U/L    Protein, total 7.6 6.4 - 8.2 g/dL    Albumin 2.7 (L) 3.4 - 5.0 g/dL    Globulin 4.9 (H) 2.0 - 4.0 g/dL    A-G Ratio 0.6 (L) 0.8 - 1.7     NT-PRO BNP    Collection Time: 11/16/17 12:18 PM   Result Value Ref Range    NT pro- 0 - 1800 PG/ML   TROPONIN I    Collection Time: 11/16/17 12:18 PM   Result Value Ref Range    Troponin-I, Qt. <0.02 0.00 - 0.06 NG/ML   URINALYSIS W/ RFLX MICROSCOPIC    Collection Time: 11/16/17  2:00 PM   Result Value Ref Range    Color YELLOW      Appearance CLEAR      Specific gravity 1.010 1.005 - 1.030      pH (UA) 5.0 5.0 - 8.0      Protein NEGATIVE  NEG mg/dL    Glucose NEGATIVE  NEG mg/dL    Ketone NEGATIVE  NEG mg/dL    Bilirubin NEGATIVE  NEG      Blood NEGATIVE  NEG      Urobilinogen 1.0 0.2 - 1.0 EU/dL    Nitrites NEGATIVE  NEG      Leukocyte Esterase NEGATIVE  NEG         Radiologic Studies -  The following have been ordered and reviewed:   MRI LUMB SPINE WO CONT   Final Result  IMPRESSION:     1. Severe spinal canal stenosis present at several levels, most notably at T11-T12 where there is deformation of both the dorsal and ventral distal spinal cord. No discrete abnormal intracord signal is demonstrated at this level. Additional significant areas of spinal canal stenosis are noted at T12-L1, as well as L3-L4.     2. Marrow signal abnormalities involving the inferior endplate of C62, superior and inferior endplate of O46, and to a lesser degree the superior endplate of L1. There is propagation of bone marrow edema into the pedicles of the T11 and T12 vertebral bodies bilaterally. These findings are most in keeping with sequela of trabecular microfracture, without associated compression deformity.     3. 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     4. Prior posterior decompression L4-L5 and L5-S1.     5. Advanced multilevel spondylosis, most pronounced at L3-L4, with associated discogenic/intradiscal signal abnormality. Adjacent superior and inferior endplates are intact.     6. Advanced, multilevel facet osteoarthrosis and areas of significant neuroforaminal narrowing as described in detail by level above.     The findings findings of severe spinal canal stenosis, as well as the marrow signal abnormalities the level of the thoracolumbar junction were discussed with Dr. Daly Lucas, emergency room physician caring for the patient prior to dictation at approximately 5:08 PM.    As read by the radiologist.       XR CHEST PORT   Final Result  Impression:  No radiographic evidence of an acute abnormality. As read by the radiologist.       Arther Libman LOWER EXT VENOUS BILAT   INTERPRETATION/FINDINGS    Right le. Deep vein(s) visualized include the common femoral, deep femoral, proximal femoral, mid femoral, distal femoral, popliteal(above knee), popliteal(fossa) and popliteal(below knee) veins. 2. No evidence of deep venous thrombosis detected in the veins visualized. 3. Superficial vein(s) visualized include the great saphenous vein. 4. No evidence of superficial thrombosis detected. 5. Prominent lymph node noted in right groin measuring 2.4 x 0.8 x 1.8     Left leg :  1. Deep vein(s) visualized include the common femoral, deep femoral, proximal femoral, mid femoral, distal femoral, popliteal(above knee), popliteal(fossa) and popliteal(below knee) veins. 2. Superficial vein(s) visualized include the great saphenous vein. 3. No evidence of superficial thrombosis detected. As read by the technologist.          Medical Decision Making   I am the first provider for this patient. I reviewed the vital signs, available nursing notes, past medical history, past surgical history, family history and social history. Vital Signs-Reviewed the patient's vital signs.    Patient Vitals for the past 12 hrs:   Temp Pulse Resp BP SpO2   11/16/17 1745 - (!) 59 15 137/51 100 %   11/16/17 1730 - 62 14 138/61 100 %   11/16/17 1715 - 70 18 139/65 -   11/16/17 1700 - (!) 59 12 118/58 -   11/16/17 1645 - 67 13 133/55 98 %   11/16/17 1545 - 69 13 122/55 96 %   11/16/17 1530 - 65 12 111/53 97 %   11/16/17 1525 - 68 17 105/57 96 %   11/16/17 1520 - 60 10 105/48 96 %   11/16/17 1515 - 65 11 101/55 97 %   11/16/17 1500 - 61 11 96/48 96 %   11/16/17 1450 - 75 19 111/51 97 %   11/16/17 1445 - 67 12 103/53 -   11/16/17 1440 - 61 11 96/51 -   11/16/17 1430 - 60 10 104/48 97 %   11/16/17 1425 - 65 9 105/55 97 %   11/16/17 1420 - (!) 59 10 97/48 97 %   11/16/17 1415 - 62 10 113/53 99 %   11/16/17 1410 - 62 11 106/49 97 %   11/16/17 1400 - 65 17 104/63 97 %   11/16/17 1355 - 64 10 112/51 97 %   11/16/17 1345 - 68 16 111/53 99 %   11/16/17 1335 - 69 16 115/53 99 %   11/16/17 1330 - 78 15 111/54 98 %   11/16/17 1320 - 70 16 124/56 98 %   11/16/17 1315 - 72 19 114/57 98 %   11/16/17 1230 - 77 16 119/66 100 %   11/16/17 1225 - 69 17 128/61 98 %   11/16/17 1220 - 73 17 147/60 97 %   11/16/17 1210 - 71 17 131/60 97 %   11/16/17 1205 - 74 19 124/58 97 %   11/16/17 1200 - 72 15 139/57 98 %   11/16/17 1150 97.9 °F (36.6 °C) - - - -       Pulse Oximetry Analysis - Normal 97% on RA     ED Course:  11:40 AM  Initial assessment performed. The patients presenting problems have been discussed, and they are in agreement with the care plan formulated and outlined with them. I have encouraged them to ask questions as they arise throughout their visit. 5:25 PM Discussed patient's history, exam, and available diagnostics results with Maranda Navas MD (Orthopedic Surgeon), who will call Gabby Monique DO (Orthopedic Surgeon) directly and call me back.      5:29 PM Discussed patient's history, exam, and available diagnostics results with Gabby Monique DO (Orthopedic Surgeon), who agree with feels as though pt is going to need neurosurgical procedure and feel that we should transfer pt to [facility] neurosurgical center. 5:49 PM Discussed patient's history, exam, and available diagnostics results with Dr. Maritza Herbert, neurosurgeon at [facility], who feels uncomfortable accepting pt directly to his service given he cannot view images or examine pt prior to accepting him. States if I feel a transfer would be necessary, recommends ER to ER transfer, so he can further evaluate the patient. 5:55 PM Discussed patient's history, exam, and available diagnostics results with Dr. Odell Allen MD, Emergency Medicine at [facility] ED, who agrees to accept pt for transfer. Medications Given in the ED:  Medications - No data to display    Transfer Note:  5:57 PM  Discussed impending transfer with Patient and/or family. Pt and/or family instructed that Pt would be transferred to [facility] ED. Discussed reasoning for transfer and future treatment plan. Family and Pt understands and agrees with care plan.       Labs Reviewed   CBC WITH AUTOMATED DIFF - Abnormal; Notable for the following:        Result Value    RBC 4.29 (*)     HGB 11.9 (*)     RDW 15.1 (*)     NEUTROPHILS 74 (*)     LYMPHOCYTES 18 (*)     All other components within normal limits   METABOLIC PANEL, COMPREHENSIVE - Abnormal; Notable for the following:     Glucose 159 (*)     BUN 27 (*)     Creatinine 1.38 (*)     GFR est AA 60 (*)     GFR est non-AA 49 (*)     AST (SGOT) 97 (*)     Albumin 2.7 (*)     Globulin 4.9 (*)     A-G Ratio 0.6 (*)     All other components within normal limits   PROTHROMBIN TIME + INR   MAGNESIUM   URINALYSIS W/ RFLX MICROSCOPIC   NT-PRO BNP   TROPONIN I   HEPATIC FUNCTION PANEL       Recent Results (from the past 12 hour(s))   CBC WITH AUTOMATED DIFF    Collection Time: 11/16/17 12:18 PM   Result Value Ref Range    WBC 7.4 4.6 - 13.2 K/uL    RBC 4.29 (L) 4.70 - 5.50 M/uL    HGB 11.9 (L) 13.0 - 16.0 g/dL    HCT 37.7 36.0 - 48.0 %    MCV 87.9 74.0 - 97.0 FL    MCH 27.7 24.0 - 34.0 PG    MCHC 31.6 31.0 - 37.0 g/dL    RDW 15.1 (H) 11.6 - 14.5 %    PLATELET 529 594 - 997 K/uL    MPV 10.0 9.2 - 11.8 FL    NEUTROPHILS 74 (H) 40 - 73 %    LYMPHOCYTES 18 (L) 21 - 52 %    MONOCYTES 8 3 - 10 %    EOSINOPHILS 0 0 - 5 %    BASOPHILS 0 0 - 2 %    ABS. NEUTROPHILS 5.5 1.8 - 8.0 K/UL    ABS. LYMPHOCYTES 1.3 0.9 - 3.6 K/UL    ABS. MONOCYTES 0.6 0.05 - 1.2 K/UL    ABS. EOSINOPHILS 0.0 0.0 - 0.4 K/UL    ABS. BASOPHILS 0.0 0.0 - 0.06 K/UL    DF AUTOMATED     PROTHROMBIN TIME + INR    Collection Time: 11/16/17 12:18 PM   Result Value Ref Range    Prothrombin time 13.0 11.5 - 15.2 sec    INR 1.0 0.8 - 1.2     MAGNESIUM    Collection Time: 11/16/17 12:18 PM   Result Value Ref Range    Magnesium 1.7 1.6 - 2.6 mg/dL   METABOLIC PANEL, COMPREHENSIVE    Collection Time: 11/16/17 12:18 PM   Result Value Ref Range    Sodium 141 136 - 145 mmol/L    Potassium 4.7 3.5 - 5.5 mmol/L    Chloride 102 100 - 108 mmol/L    CO2 31 21 - 32 mmol/L    Anion gap 8 3.0 - 18 mmol/L    Glucose 159 (H) 74 - 99 mg/dL    BUN 27 (H) 7.0 - 18 MG/DL    Creatinine 1.38 (H) 0.6 - 1.3 MG/DL    BUN/Creatinine ratio 20 12 - 20      GFR est AA 60 (L) >60 ml/min/1.73m2    GFR est non-AA 49 (L) >60 ml/min/1.73m2    Calcium 9.4 8.5 - 10.1 MG/DL    Bilirubin, total 0.6 0.2 - 1.0 MG/DL    ALT (SGPT) 56 16 - 61 U/L    AST (SGOT) 97 (H) 15 - 37 U/L    Alk.  phosphatase 113 45 - 117 U/L    Protein, total 7.6 6.4 - 8.2 g/dL    Albumin 2.7 (L) 3.4 - 5.0 g/dL    Globulin 4.9 (H) 2.0 - 4.0 g/dL    A-G Ratio 0.6 (L) 0.8 - 1.7     NT-PRO BNP    Collection Time: 11/16/17 12:18 PM   Result Value Ref Range    NT pro- 0 - 1800 PG/ML   TROPONIN I    Collection Time: 11/16/17 12:18 PM   Result Value Ref Range    Troponin-I, Qt. <0.02 0.00 - 0.06 NG/ML   URINALYSIS W/ RFLX MICROSCOPIC    Collection Time: 11/16/17  2:00 PM   Result Value Ref Range    Color YELLOW      Appearance CLEAR      Specific gravity 1.010 1.005 - 1.030      pH (UA) 5.0 5.0 - 8.0      Protein NEGATIVE  NEG mg/dL Glucose NEGATIVE  NEG mg/dL    Ketone NEGATIVE  NEG mg/dL    Bilirubin NEGATIVE  NEG      Blood NEGATIVE  NEG      Urobilinogen 1.0 0.2 - 1.0 EU/dL    Nitrites NEGATIVE  NEG      Leukocyte Esterase NEGATIVE  NEG           Critical Care Time:  I have spent 60 minutes of critical care time involved in lab review, consultations with specialist, family decision-making, and documentation. During this entire length of time I was immediately available to the patient. Critical Care: The reason for providing this level of medical care for this critically ill patient was due a critical illness that impaired one or more vital organ systems such that there was a high probability of imminent or life threatening deterioration in the patients condition. This care involved high complexity decision making to assess, manipulate, and support vital system functions, to treat this degreee vital organ system failure and to prevent further life threatening deterioration of the patients condition. Diagnosis   Clinical Impression:   1. Spinal cord compression ([address]    2. Spinal stenosis of thoracic region    3. Weakness of both lower extremities    4. Urinary retention         Discussion:  80 y.o. male presents for LE weakness with inability to urinate and urinary retention with overflow incontinence, due to significant weakness on his exam and MRI was performed which shows severe stenosis at T11/T12 level with an associated bulging disc and spinal cord narrowing to 4 mm. Initially discussed this pt with Anna Bianchi DO (Orthopedic Surgeon) pt's primary ortho spine doctor who is concerned due to level of stenosis in the thoracic area, he will require neurosurgical intervention. He is requesting transfer to [facility]. Spoke with neurosurgery at [facility] who states he cannot accept pt directly and if pt is transferred ER to ER he will evaluate pt at [facility] ER department.       _______________________________   Attestations:      This note is prepared by Ru Aguirre, acting as a Scribe for Eugene Brody MD.    Eugene Brody MD: The scribe's documentation has been prepared under my direction and personally reviewed by me in its entirety.  I confirm that the note above accurately reflects all work, treatment, procedures, and medical decision making performed by me.    _______________________________

## 2017-11-17 NOTE — ED NOTES
Pt left via [address] ambulance to [facility] for continuation of care. No distress. PIV removed in chart for purposes of documentation. Cox catheter also in place when pt left.

## 2017-11-17 NOTE — ED NOTES
Pt received from previous nurse. VS stable on cardiac monitor. Pt alert and oriented x3. No signs of acute distress at this time. Breathing with ease on room air. NO complaints. Pt with bilateral lower extremity pitting edema +3. Right 20G AC PIV with no signs of infiltration. Luevano catheter in place with clear yellow urine noted. Pt to be transferred to [address] [place] [address]. Awaiting transport. Continue to monitor.

## 2018-01-31 ENCOUNTER — APPOINTMENT (OUTPATIENT)
Dept: CT IMAGING | Age: 83
DRG: 463 | End: 2018-01-31
Attending: EMERGENCY MEDICINE
Payer: MEDICARE

## 2018-01-31 ENCOUNTER — APPOINTMENT (OUTPATIENT)
Dept: GENERAL RADIOLOGY | Age: 83
DRG: 463 | End: 2018-01-31
Attending: EMERGENCY MEDICINE
Payer: MEDICARE

## 2018-01-31 ENCOUNTER — HOSPITAL ENCOUNTER (OUTPATIENT)
Dept: PREADMISSION TESTING | Age: 83
Discharge: HOME | DRG: 463 | End: 2018-01-31
Payer: MEDICARE

## 2018-01-31 ENCOUNTER — HOSPITAL ENCOUNTER (INPATIENT)
Age: 83
LOS: 7 days | Discharge: NURSING, REHAB OR LONG-TERM CARE | DRG: 463 | End: 2018-02-07
Attending: EMERGENCY MEDICINE | Admitting: INTERNAL MEDICINE
Payer: MEDICARE

## 2018-01-31 VITALS — WEIGHT: 196 LBS | HEIGHT: 65 IN | BODY MASS INDEX: 32.65 KG/M2

## 2018-01-31 DIAGNOSIS — A41.9 SEPSIS, DUE TO UNSPECIFIED ORGANISM: Primary | ICD-10-CM

## 2018-01-31 DIAGNOSIS — Z89.529 ACQUIRED ABSENCE OF KNEE JOINT FOLLOWING EXPLANTATION OF JOINT PROSTHESIS WITH PRESENCE OF ANTIBIOTIC-IMPREGNATED CEMENT SPACER: ICD-10-CM

## 2018-01-31 DIAGNOSIS — M00.9 PYOGENIC ARTHRITIS OF RIGHT KNEE JOINT, DUE TO UNSPECIFIED ORGANISM (HCC): ICD-10-CM

## 2018-01-31 PROBLEM — R55 SYNCOPE: Status: ACTIVE | Noted: 2018-01-31

## 2018-01-31 PROBLEM — Z96.659 INFECTED PROSTHETIC KNEE JOINT (HCC): Status: ACTIVE | Noted: 2018-01-31

## 2018-01-31 PROBLEM — T84.59XA INFECTED PROSTHETIC KNEE JOINT (HCC): Status: ACTIVE | Noted: 2018-01-31

## 2018-01-31 LAB
ALBUMIN SERPL-MCNC: 2.4 G/DL (ref 3.4–5)
ALBUMIN SERPL-MCNC: 2.5 G/DL (ref 3.4–5)
ALBUMIN/GLOB SERPL: 0.5 {RATIO} (ref 0.8–1.7)
ALBUMIN/GLOB SERPL: 0.5 {RATIO} (ref 0.8–1.7)
ALP SERPL-CCNC: 85 U/L (ref 45–117)
ALP SERPL-CCNC: 92 U/L (ref 45–117)
ALT SERPL-CCNC: 39 U/L (ref 16–61)
ALT SERPL-CCNC: 44 U/L (ref 16–61)
ANION GAP SERPL CALC-SCNC: 8 MMOL/L (ref 3–18)
ANION GAP SERPL CALC-SCNC: 9 MMOL/L (ref 3–18)
APPEARANCE UR: CLEAR
APTT PPP: 34.4 SEC (ref 23–36.4)
AST SERPL-CCNC: 35 U/L (ref 15–37)
AST SERPL-CCNC: 37 U/L (ref 15–37)
BACTERIA URNS QL MICRO: ABNORMAL /HPF
BASOPHILS # BLD: 0 K/UL (ref 0–0.06)
BASOPHILS NFR BLD: 0 % (ref 0–2)
BILIRUB DIRECT SERPL-MCNC: 0.2 MG/DL (ref 0–0.2)
BILIRUB SERPL-MCNC: 0.6 MG/DL (ref 0.2–1)
BILIRUB SERPL-MCNC: 0.6 MG/DL (ref 0.2–1)
BILIRUB UR QL: NEGATIVE
BUN SERPL-MCNC: 16 MG/DL (ref 7–18)
BUN SERPL-MCNC: 17 MG/DL (ref 7–18)
BUN/CREAT SERPL: 15 (ref 12–20)
BUN/CREAT SERPL: 16 (ref 12–20)
CALCIUM SERPL-MCNC: 9 MG/DL (ref 8.5–10.1)
CALCIUM SERPL-MCNC: 9.1 MG/DL (ref 8.5–10.1)
CHLORIDE SERPL-SCNC: 99 MMOL/L (ref 100–108)
CHLORIDE SERPL-SCNC: 99 MMOL/L (ref 100–108)
CO2 SERPL-SCNC: 29 MMOL/L (ref 21–32)
CO2 SERPL-SCNC: 32 MMOL/L (ref 21–32)
COLOR UR: YELLOW
CREAT SERPL-MCNC: 1.07 MG/DL (ref 0.6–1.3)
CREAT SERPL-MCNC: 1.1 MG/DL (ref 0.6–1.3)
CRP SERPL-MCNC: 3.8 MG/DL (ref 0–0.3)
DIFFERENTIAL METHOD BLD: ABNORMAL
EOSINOPHIL # BLD: 0 K/UL (ref 0–0.4)
EOSINOPHIL NFR BLD: 1 % (ref 0–5)
EPITH CASTS URNS QL MICRO: ABNORMAL /LPF (ref 0–5)
ERYTHROCYTE [DISTWIDTH] IN BLOOD BY AUTOMATED COUNT: 18.7 % (ref 11.6–14.5)
ERYTHROCYTE [DISTWIDTH] IN BLOOD BY AUTOMATED COUNT: 18.8 % (ref 11.6–14.5)
ERYTHROCYTE [SEDIMENTATION RATE] IN BLOOD: 85 MM/HR (ref 0–20)
ERYTHROCYTE [SEDIMENTATION RATE] IN BLOOD: 96 MM/HR (ref 0–20)
EST. AVERAGE GLUCOSE BLD GHB EST-MCNC: 148 MG/DL
GLOBULIN SER CALC-MCNC: 4.8 G/DL (ref 2–4)
GLOBULIN SER CALC-MCNC: 5.3 G/DL (ref 2–4)
GLUCOSE BLD STRIP.AUTO-MCNC: 150 MG/DL (ref 70–110)
GLUCOSE SERPL-MCNC: 142 MG/DL (ref 74–99)
GLUCOSE SERPL-MCNC: 153 MG/DL (ref 74–99)
GLUCOSE UR STRIP.AUTO-MCNC: NEGATIVE MG/DL
HBA1C MFR BLD: 6.8 % (ref 4.5–5.6)
HCT VFR BLD AUTO: 34.9 % (ref 36–48)
HCT VFR BLD AUTO: 35.3 % (ref 36–48)
HGB BLD-MCNC: 11.1 G/DL (ref 13–16)
HGB BLD-MCNC: 11.4 G/DL (ref 13–16)
HGB UR QL STRIP: NEGATIVE
INR PPP: 1.1 (ref 0.8–1.2)
INR PPP: 1.2 (ref 0.8–1.2)
KETONES UR QL STRIP.AUTO: NEGATIVE MG/DL
LACTATE SERPL-SCNC: 1.6 MMOL/L (ref 0.4–2)
LEUKOCYTE ESTERASE UR QL STRIP.AUTO: NEGATIVE
LYMPHOCYTES # BLD: 0.5 K/UL (ref 0.9–3.6)
LYMPHOCYTES NFR BLD: 9 % (ref 21–52)
MAGNESIUM SERPL-MCNC: 1.5 MG/DL (ref 1.6–2.6)
MCH RBC QN AUTO: 27.1 PG (ref 24–34)
MCH RBC QN AUTO: 27.7 PG (ref 24–34)
MCHC RBC AUTO-ENTMCNC: 31.8 G/DL (ref 31–37)
MCHC RBC AUTO-ENTMCNC: 32.3 G/DL (ref 31–37)
MCV RBC AUTO: 85.3 FL (ref 74–97)
MCV RBC AUTO: 85.9 FL (ref 74–97)
MONOCYTES # BLD: 0.5 K/UL (ref 0.05–1.2)
MONOCYTES NFR BLD: 10 % (ref 3–10)
NEUTS SEG # BLD: 4.1 K/UL (ref 1.8–8)
NEUTS SEG NFR BLD: 80 % (ref 40–73)
NITRITE UR QL STRIP.AUTO: NEGATIVE
PH UR STRIP: 6.5 [PH] (ref 5–8)
PLATELET # BLD AUTO: 221 K/UL (ref 135–420)
PLATELET # BLD AUTO: 222 K/UL (ref 135–420)
PMV BLD AUTO: 9.7 FL (ref 9.2–11.8)
PMV BLD AUTO: 9.9 FL (ref 9.2–11.8)
POTASSIUM SERPL-SCNC: 4.2 MMOL/L (ref 3.5–5.5)
POTASSIUM SERPL-SCNC: 4.4 MMOL/L (ref 3.5–5.5)
PROT SERPL-MCNC: 7.3 G/DL (ref 6.4–8.2)
PROT SERPL-MCNC: 7.7 G/DL (ref 6.4–8.2)
PROT UR STRIP-MCNC: 30 MG/DL
PROTHROMBIN TIME: 13.8 SEC (ref 11.5–15.2)
PROTHROMBIN TIME: 14.4 SEC (ref 11.5–15.2)
RBC # BLD AUTO: 4.09 M/UL (ref 4.7–5.5)
RBC # BLD AUTO: 4.11 M/UL (ref 4.7–5.5)
RBC #/AREA URNS HPF: NEGATIVE /HPF (ref 0–5)
SODIUM SERPL-SCNC: 137 MMOL/L (ref 136–145)
SODIUM SERPL-SCNC: 139 MMOL/L (ref 136–145)
SP GR UR REFRACTOMETRY: 1.02 (ref 1–1.03)
UROBILINOGEN UR QL STRIP.AUTO: 1 EU/DL (ref 0.2–1)
WBC # BLD AUTO: 5.1 K/UL (ref 4.6–13.2)
WBC # BLD AUTO: 5.2 K/UL (ref 4.6–13.2)
WBC URNS QL MICRO: NEGATIVE /HPF (ref 0–5)

## 2018-01-31 PROCEDURE — 85730 THROMBOPLASTIN TIME PARTIAL: CPT | Performed by: ORTHOPAEDIC SURGERY

## 2018-01-31 PROCEDURE — 74011000250 HC RX REV CODE- 250: Performed by: EMERGENCY MEDICINE

## 2018-01-31 PROCEDURE — 77030011943

## 2018-01-31 PROCEDURE — 74011250636 HC RX REV CODE- 250/636: Performed by: INTERNAL MEDICINE

## 2018-01-31 PROCEDURE — 74011250636 HC RX REV CODE- 250/636: Performed by: EMERGENCY MEDICINE

## 2018-01-31 PROCEDURE — 85025 COMPLETE CBC W/AUTO DIFF WBC: CPT | Performed by: EMERGENCY MEDICINE

## 2018-01-31 PROCEDURE — 85027 COMPLETE CBC AUTOMATED: CPT | Performed by: ORTHOPAEDIC SURGERY

## 2018-01-31 PROCEDURE — 96365 THER/PROPH/DIAG IV INF INIT: CPT

## 2018-01-31 PROCEDURE — 80076 HEPATIC FUNCTION PANEL: CPT | Performed by: EMERGENCY MEDICINE

## 2018-01-31 PROCEDURE — 65660000000 HC RM CCU STEPDOWN

## 2018-01-31 PROCEDURE — 81001 URINALYSIS AUTO W/SCOPE: CPT | Performed by: EMERGENCY MEDICINE

## 2018-01-31 PROCEDURE — 94761 N-INVAS EAR/PLS OXIMETRY MLT: CPT

## 2018-01-31 PROCEDURE — 83605 ASSAY OF LACTIC ACID: CPT | Performed by: EMERGENCY MEDICINE

## 2018-01-31 PROCEDURE — 71045 X-RAY EXAM CHEST 1 VIEW: CPT

## 2018-01-31 PROCEDURE — 80053 COMPREHEN METABOLIC PANEL: CPT | Performed by: ORTHOPAEDIC SURGERY

## 2018-01-31 PROCEDURE — 85652 RBC SED RATE AUTOMATED: CPT | Performed by: EMERGENCY MEDICINE

## 2018-01-31 PROCEDURE — 99285 EMERGENCY DEPT VISIT HI MDM: CPT

## 2018-01-31 PROCEDURE — 85652 RBC SED RATE AUTOMATED: CPT | Performed by: ORTHOPAEDIC SURGERY

## 2018-01-31 PROCEDURE — 82962 GLUCOSE BLOOD TEST: CPT

## 2018-01-31 PROCEDURE — 83735 ASSAY OF MAGNESIUM: CPT | Performed by: EMERGENCY MEDICINE

## 2018-01-31 PROCEDURE — 80048 BASIC METABOLIC PNL TOTAL CA: CPT | Performed by: EMERGENCY MEDICINE

## 2018-01-31 PROCEDURE — 83036 HEMOGLOBIN GLYCOSYLATED A1C: CPT | Performed by: ORTHOPAEDIC SURGERY

## 2018-01-31 PROCEDURE — 70450 CT HEAD/BRAIN W/O DYE: CPT

## 2018-01-31 PROCEDURE — 96375 TX/PRO/DX INJ NEW DRUG ADDON: CPT

## 2018-01-31 PROCEDURE — 87641 MR-STAPH DNA AMP PROBE: CPT | Performed by: ORTHOPAEDIC SURGERY

## 2018-01-31 PROCEDURE — 85610 PROTHROMBIN TIME: CPT | Performed by: ORTHOPAEDIC SURGERY

## 2018-01-31 PROCEDURE — 87086 URINE CULTURE/COLONY COUNT: CPT | Performed by: EMERGENCY MEDICINE

## 2018-01-31 PROCEDURE — 87040 BLOOD CULTURE FOR BACTERIA: CPT | Performed by: EMERGENCY MEDICINE

## 2018-01-31 PROCEDURE — 86140 C-REACTIVE PROTEIN: CPT | Performed by: EMERGENCY MEDICINE

## 2018-01-31 PROCEDURE — 74011250637 HC RX REV CODE- 250/637: Performed by: INTERNAL MEDICINE

## 2018-01-31 PROCEDURE — 93005 ELECTROCARDIOGRAM TRACING: CPT

## 2018-01-31 RX ORDER — CEFAZOLIN SODIUM 2 G/50ML
2 SOLUTION INTRAVENOUS ONCE
Status: CANCELLED | OUTPATIENT
Start: 2018-01-31 | End: 2018-01-31

## 2018-01-31 RX ORDER — INSULIN LISPRO 100 [IU]/ML
0-5 INJECTION, SOLUTION INTRAVENOUS; SUBCUTANEOUS 4 TIMES DAILY
COMMUNITY

## 2018-01-31 RX ORDER — BISACODYL 5 MG
10 TABLET, DELAYED RELEASE (ENTERIC COATED) ORAL DAILY PRN
COMMUNITY

## 2018-01-31 RX ORDER — ATORVASTATIN CALCIUM 20 MG/1
20 TABLET, FILM COATED ORAL DAILY
Status: DISCONTINUED | OUTPATIENT
Start: 2018-02-01 | End: 2018-02-07 | Stop reason: HOSPADM

## 2018-01-31 RX ORDER — LISINOPRIL 5 MG/1
5 TABLET ORAL DAILY
Status: DISCONTINUED | OUTPATIENT
Start: 2018-02-01 | End: 2018-02-02

## 2018-01-31 RX ORDER — VANCOMYCIN HYDROCHLORIDE 750 MG/150ML
750 INJECTION, SOLUTION INTRAVENOUS EVERY 12 HOURS
Status: DISCONTINUED | OUTPATIENT
Start: 2018-02-01 | End: 2018-02-01 | Stop reason: DRUGHIGH

## 2018-01-31 RX ORDER — DOXYCYCLINE 100 MG/1
1 CAPSULE ORAL 2 TIMES DAILY
COMMUNITY
Start: 2018-01-13 | End: 2018-02-07

## 2018-01-31 RX ORDER — ALFUZOSIN HYDROCHLORIDE 10 MG/1
10 TABLET, EXTENDED RELEASE ORAL DAILY
Status: DISCONTINUED | OUTPATIENT
Start: 2018-02-01 | End: 2018-02-07 | Stop reason: HOSPADM

## 2018-01-31 RX ORDER — ATORVASTATIN CALCIUM 20 MG/1
20 TABLET, FILM COATED ORAL DAILY
COMMUNITY

## 2018-01-31 RX ORDER — ALFUZOSIN HYDROCHLORIDE 10 MG/1
10 TABLET, EXTENDED RELEASE ORAL DAILY
COMMUNITY
Start: 2017-11-22

## 2018-01-31 RX ORDER — BETHANECHOL CHLORIDE 25 MG/1
25 TABLET ORAL 3 TIMES DAILY
COMMUNITY
Start: 2017-11-21

## 2018-01-31 RX ORDER — TRAMADOL HYDROCHLORIDE 50 MG/1
50 TABLET ORAL
Status: ON HOLD | COMMUNITY
End: 2018-02-05

## 2018-01-31 RX ORDER — ENOXAPARIN SODIUM 100 MG/ML
40 INJECTION SUBCUTANEOUS DAILY
Status: ON HOLD | COMMUNITY
Start: 2017-11-22 | End: 2018-02-05

## 2018-01-31 RX ORDER — ENOXAPARIN SODIUM 100 MG/ML
40 INJECTION SUBCUTANEOUS DAILY
Status: DISCONTINUED | OUTPATIENT
Start: 2018-02-01 | End: 2018-02-01

## 2018-01-31 RX ORDER — LISINOPRIL 5 MG/1
5 TABLET ORAL DAILY
COMMUNITY

## 2018-01-31 RX ORDER — SODIUM CHLORIDE 9 MG/ML
100 INJECTION, SOLUTION INTRAVENOUS CONTINUOUS
Status: DISCONTINUED | OUTPATIENT
Start: 2018-01-31 | End: 2018-02-07 | Stop reason: HOSPADM

## 2018-01-31 RX ORDER — BETHANECHOL CHLORIDE 10 MG/1
25 TABLET ORAL 3 TIMES DAILY
Status: DISCONTINUED | OUTPATIENT
Start: 2018-01-31 | End: 2018-02-07 | Stop reason: HOSPADM

## 2018-01-31 RX ORDER — SODIUM CHLORIDE 0.9 % (FLUSH) 0.9 %
5-10 SYRINGE (ML) INJECTION AS NEEDED
Status: DISCONTINUED | OUTPATIENT
Start: 2018-01-31 | End: 2018-02-04 | Stop reason: SDUPTHER

## 2018-01-31 RX ORDER — HEPARIN SODIUM 5000 [USP'U]/ML
5000 INJECTION, SOLUTION INTRAVENOUS; SUBCUTANEOUS EVERY 8 HOURS
Status: DISCONTINUED | OUTPATIENT
Start: 2018-01-31 | End: 2018-01-31

## 2018-01-31 RX ADMIN — SODIUM CHLORIDE 125 ML/HR: 900 INJECTION, SOLUTION INTRAVENOUS at 21:26

## 2018-01-31 RX ADMIN — SODIUM CHLORIDE 2667 ML: 900 INJECTION, SOLUTION INTRAVENOUS at 17:13

## 2018-01-31 RX ADMIN — WATER 2 G: 1 INJECTION INTRAMUSCULAR; INTRAVENOUS; SUBCUTANEOUS at 23:50

## 2018-01-31 RX ADMIN — WATER 2 G: 1 INJECTION INTRAMUSCULAR; INTRAVENOUS; SUBCUTANEOUS at 17:18

## 2018-01-31 RX ADMIN — BETHANECHOL CHLORIDE 25 MG: 10 TABLET ORAL at 21:31

## 2018-01-31 RX ADMIN — VANCOMYCIN HYDROCHLORIDE 1750 MG: 10 INJECTION, POWDER, LYOPHILIZED, FOR SOLUTION INTRAVENOUS at 17:26

## 2018-01-31 NOTE — PROGRESS NOTES
Pharmacy Dosing Services: Vancomycin    Consult for Vancomycin Dosing by Pharmacy by   Consult provided for this 80y.o. year old male , for indication of Bone/Joint Infection  Day of Therapy 1    Ht Readings from Last 1 Encounters:   01/31/18 165.1 cm (65\")        Wt Readings from Last 1 Encounters:   01/31/18 88.9 kg (196 lb)        Previous Regimen NA   Last Level NA   Other Current Antibiotics Cefepime 2 gm Q8H   Significant Cultures Pending   Serum Creatinine Lab Results   Component Value Date/Time    Creatinine 1.07 01/31/2018 03:49 PM      Creatinine Clearance Estimated Creatinine Clearance: 52.7 mL/min (based on Cr of 1.07). BUN Lab Results   Component Value Date/Time    BUN 17 01/31/2018 03:49 PM      WBC Lab Results   Component Value Date/Time    WBC 5.1 01/31/2018 03:49 PM      H/H Lab Results   Component Value Date/Time    HGB 11.4 01/31/2018 03:49 PM      Platelets Lab Results   Component Value Date/Time    PLATELET 129 47/44/1510 03:49 PM      Temp (!) 103 °F (39.4 °C)       Start Vancomycin therapy, with loading dose of 1750 (mg) at 1700 on 1/31/18 (time/date). Follow with maintenance dose of 750 (mg) at 0500 on 2/1/18 (time/date), every 12 hours (frequency). Dose calculated to approximate a therapeutic trough of 15-20 mcg/mL. Pharmacy to follow daily and will make changes to dose and/or frequency based on clinical status.       Pharmacist Abel Ling, [address] [facility]

## 2018-01-31 NOTE — ED PROVIDER NOTES
EMERGENCY DEPARTMENT HISTORY AND PHYSICAL EXAM    Date: 1/31/2018  Patient Name: Radha Snow    History of Presenting Illness     Chief Complaint   Patient presents with    Altered mental status         History Provided By: Patient    Chief Complaint: syncope  Duration: PTA   Timing:  Acute  Location: N/A  Quality: n/a  Severity: 0 out of 10  Modifying Factors: None  Associated Symptoms: denies any other associated signs or symptoms    Additional History (Context):   3:29 PM  Radha Snow is a 80 y.o. male with PMHX of HLD, urgency, HTN, POLINA, Arthritis, DM, and coagulation disorder who presents to the emergency department via EMS C/O an unrecalled syncope episode where pt had a blank stare without responding to any questions onset PTA while at this facility. Pt trying to be admitted to rehab facility due to knee infection that had surgery. While pt was being brought to pre-admission testing at this facility, pt had an episode where he was unable to answer questions for 30 seconds while in his wheelchair and he stared blankly. Unable to assess neuro at the time, but pt was unable to recall anything that happened during this episode. No jerking during episode. No CP, no SOB. Pain in knee but no other symptoms. No cough. Symptoms improved on own. Still some minor confusion per family at bedside. Allergies reported to Oxycodone. PSHx includes appendectomy, cataract removal, vasectomy, back surgery, and orthopedic surgery. Pt denies weakness, head pain or injury, and any other sxs or complaints.      PCP: Becky Mayorga MD    Current Facility-Administered Medications   Medication Dose Route Frequency Provider Last Rate Last Dose    cefepime (MAXIPIME) 2 g in sterile water (preservative free) 10 mL IV syringe  2 g IntraVENous Q12H Kasandra Lopez MD   2 g at 02/01/18 1147    vancomycin (VANCOCIN) 1,000 mg in 0.9% sodium chloride (MBP/ADV) 250 mL adv  1,000 mg IntraVENous Q12H MD Rodolfo Carlson ON 2/2/2018] amLODIPine (NORVASC) tablet 5 mg  5 mg Oral DAILY Lisy Loera PA-C        traMADol Becca Poster) tablet 50 mg  50 mg Oral Q6H PRN Lisy Loera PA-C        acetaminophen (TYLENOL) tablet 650 mg  650 mg Oral Q6H PRN iLsy Loera PA-C   650 mg at 02/01/18 1551    traZODone (DESYREL) tablet 50 mg  50 mg Oral QHS Lisy Loera PA-C        sodium chloride (NS) flush 5-10 mL  5-10 mL IntraVENous PRN Christoph Calvillo MD        alfuzosin SR (UROXATRAL) tablet 10 mg  10 mg Oral DAILY Omari Orantes MD   10 mg at 02/01/18 1147    atorvastatin (LIPITOR) tablet 20 mg  20 mg Oral DAILY Omari Orantes MD   20 mg at 02/01/18 1145    bethanechol (URECHOLINE) tablet 25 mg  25 mg Oral TID Omari Orantes MD   25 mg at 02/01/18 0700    lisinopril (PRINIVIL, ZESTRIL) tablet 5 mg  5 mg Oral DAILY Omari Orantes MD   5 mg at 02/01/18 1145    0.9% sodium chloride infusion  125 mL/hr IntraVENous CONTINUOUS Omari Orantes  mL/hr at 01/31/18 2126 125 mL/hr at 01/31/18 2126       Past History     Past Medical History:  Past Medical History:   Diagnosis Date    Arthritis     Coagulation disorder ([facility] [address]     Lovenox    Diabetes ([facility] [address] 2005    diet controlled, on insulin at this time (1/31/18)    Hypercholesteremia     Hypertension 2005    Sleep apnea 2008    no treatment    Urinary urgency        Past Surgical History:  Past Surgical History:   Procedure Laterality Date    HX APPENDECTOMY      HX BACK SURGERY  11/2017    X 3-last surgery 11/2017    HX BACK SURGERY  1990's    cervcal neck    HX CATARACT REMOVAL      bilateral    HX ORTHOPAEDIC  2013    right knee replacement    HX ORTHOPAEDIC  05/2017    ligament repair    HX VASECTOMY         Family History:  History reviewed. No pertinent family history.     Social History:  Social History   Substance Use Topics    Smoking status: Never Smoker    Smokeless tobacco: Never Used    Alcohol use No      Comment: Allergies: Allergies   Allergen Reactions    Oxycodone Other (comments)     :makes him crazy per wife         Review of Systems   Review of Systems   Constitutional: Negative for fever. HENT:        (-) Head pain   Respiratory: Negative for shortness of breath. Cardiovascular: Negative for chest pain. Genitourinary: Negative for urgency. Musculoskeletal: Negative for neck pain and neck stiffness. Skin: Negative for rash. Neurological: Positive for syncope and speech difficulty. Negative for seizures, weakness and headaches. All other systems reviewed and are negative. Physical Exam     Vitals:    02/01/18 0710 02/01/18 1111 02/01/18 1157 02/01/18 1431   BP: 174/62 168/59  (!) 133/95   Pulse: 93 86  94   Resp: 19 18 18   Temp: (!) 101.9 °F (38.8 °C) 99.5 °F (37.5 °C)  100 °F (37.8 °C)   SpO2: 96% 97% 96% 100%   Weight:       Height:         Physical Exam   Nursing note and vitals reviewed. Vital signs and nursing notes reviewed    CONSTITUTIONAL: Alert, in no apparent distress; well-developed; well-nourished. HEAD:  Normocephalic, atraumatic  EYES: PERRL; EOM's intact. ENTM: Nose: no rhinorrhea; Throat: no erythema or exudate, mucous membranes moist  Neck:  No JVD, supple without lymphadenopathy  RESP: Chest clear, equal breath sounds. CV: S1 and S2 WNL; No murmurs, gallops or rubs. GI: Normal bowel sounds, abdomen soft and non-tender. No masses or organomegaly. : No costo-vertebral angle tenderness. BACK:  Non-tender  UPPER EXT:  Normal inspection  LOWER EXT: No edema, no calf tenderness. Distal pulses intact. Knee immobilizer on the right knee. Old scar over right knee. Mild swelling with warmth. NEURO: CN intact, no motor deficits on exam  SKIN: No rashes; Normal for age and stage.   PSYCH:  Alert and oriented, slow to anser questions    Diagnostic Study Results     Labs -     Recent Results (from the past 12 hour(s))   METABOLIC PANEL, BASIC    Collection Time: 02/01/18  5:30 AM   Result Value Ref Range    Sodium 141 136 - 145 mmol/L    Potassium 3.8 3.5 - 5.5 mmol/L    Chloride 105 100 - 108 mmol/L    CO2 24 21 - 32 mmol/L    Anion gap 12 3.0 - 18 mmol/L    Glucose 130 (H) 74 - 99 mg/dL    BUN 16 7.0 - 18 MG/DL    Creatinine 1.00 0.6 - 1.3 MG/DL    BUN/Creatinine ratio 16 12 - 20      GFR est AA >60 >60 ml/min/1.73m2    GFR est non-AA >60 >60 ml/min/1.73m2    Calcium 8.4 (L) 8.5 - 10.1 MG/DL   CBC W/O DIFF    Collection Time: 02/01/18  5:30 AM   Result Value Ref Range    WBC 3.8 (L) 4.6 - 13.2 K/uL    RBC 3.56 (L) 4.70 - 5.50 M/uL    HGB 9.6 (L) 13.0 - 16.0 g/dL    HCT 30.0 (L) 36.0 - 48.0 %    MCV 84.3 74.0 - 97.0 FL    MCH 27.0 24.0 - 34.0 PG    MCHC 32.0 31.0 - 37.0 g/dL    RDW 18.9 (H) 11.6 - 14.5 %    PLATELET 493 635 - 099 K/uL    MPV 9.5 9.2 - 11.8 FL   TYPE & SCREEN    Collection Time: 02/01/18 12:15 PM   Result Value Ref Range    Crossmatch Expiration 02/15/2018     ABO/Rh(D) Lilia Tomas NEGATIVE     Antibody screen NEG        Radiologic Studies -     CT Results  (Last 48 hours)               01/31/18 1551  CT HEAD WO CONT Final result    Impression:  IMPRESSION:       Evidence of atrophy and chronic microvascular changes mainly in the deep white   matter. No evidence of an acute intracranial process seen. No hemorrhage, definite acute   infarct, or mass effect noted. The findings were called to Dr. Silviano Romero on 1/31/2018 at [phone] by Dr. Radha West. Narrative:  EXAM: CT head       INDICATION: Transient altered mental status, code S.       COMPARISON: None. TECHNIQUE: Axial CT imaging of the head was performed without intravenous   contrast. Coronal and sagittal reconstructions were obtained. Dose reduction: One or more dose reduction techniques were used on this CT:   automated exposure control, adjustment of the mAs and/or kVp according to   patient's size, and iterative reconstruction techniques.  The specific techniques   utilized on this CT exam have been documented in the patient's electronic   medical record.   _______________       FINDINGS:       BRAIN PARENCHYMA: There is no evidence of acute intracranial hemorrhage, mass   effect, midline shift, or herniation. No definite CT evidence of acute cortical   infarct is seen. Patchy periventricular white matter low density changes are   compatible with areas of chronic ischemia and microinfarcts. Mild vascular   calcifications are present. VENTRICLES/EXTRA-AXIAL SPACES/MENINGES: There is mild diffuse atrophy. OSSEOUS STRUCTURES: No fracture is seen. PARANASAL SINUSES/MASTOIDS: Visualized paranasal sinuses and mastoid air cells   are clear. ORBITS: The visualized orbits are unremarkable. OTHER: None.         _______________             As read by the radiologist.    CXR Results  (Last 48 hours)               01/31/18 1659  XR CHEST PORT Final result    Impression:  Impression:   Mildly underexpanded lungs without superimposed acute radiographic abnormality. Narrative:  Chest, single view       Indication: Altered mental status. Suspected stroke. Comparison: Several prior exams, most recently 11/16/2017       Findings:  Portable upright AP view of the chest was obtained. The lungs are   mildly underexpanded but clear. There is no focal pneumonic consolidation. No   pneumothorax or pleural effusion.  Cardiac size and mediastinal contours are   normal. There is no acute osseous abnormality             As read by the radiologist.    Medications given in the ED-  Medications   sodium chloride (NS) flush 5-10 mL (not administered)   alfuzosin SR (UROXATRAL) tablet 10 mg (10 mg Oral Given 2/1/18 1147)   atorvastatin (LIPITOR) tablet 20 mg (20 mg Oral Given 2/1/18 1145)   bethanechol (URECHOLINE) tablet 25 mg (25 mg Oral Given 2/1/18 0700)   lisinopril (PRINIVIL, ZESTRIL) tablet 5 mg (5 mg Oral Given 2/1/18 1145)   0.9% sodium chloride infusion (125 mL/hr IntraVENous New Bag 1/31/18 2126)   cefepime (MAXIPIME) 2 g in sterile water (preservative free) 10 mL IV syringe ( IntraVENous Canceled Entry 2/1/18 1300)   vancomycin (VANCOCIN) 1,000 mg in 0.9% sodium chloride (MBP/ADV) 250 mL adv (not administered)   amLODIPine (NORVASC) tablet 5 mg (not administered)   traMADol (ULTRAM) tablet 50 mg (not administered)   acetaminophen (TYLENOL) tablet 650 mg (650 mg Oral Given 2/1/18 1551)   traZODone (DESYREL) tablet 50 mg (not administered)   sodium chloride 0.9 % bolus infusion 2,667 mL (2,667 mL IntraVENous New Bag 1/31/18 1713)   vancomycin (VANCOCIN) 1,750 mg in 0.9% sodium chloride 500 mL IVPB (1,750 mg IntraVENous New Bag 1/31/18 1726)   perflutren lipid microspheres (DEFINITY) in NS bolus IV (1 mL IntraVENous Given 2/1/18 0944)   iopamidol (ISOVUE-370) 76 % injection 100 mL (100 mL IntraVENous Given 2/1/18 1049)         Medical Decision Making   I am the first provider for this patient. I reviewed the vital signs, available nursing notes, past medical history, past surgical history, family history and social history. Vital Signs-Reviewed the patient's vital signs. Pulse Oximetry Analysis - 100% on room air. Cardiac Monitor:  Rate: 132 bpm  Rhythm: sinus tachycardia    EKG interpretation: (Preliminary)  3:35 PM   Sinus tachycardia at 102 bpm. WA interval at 182 ms. Negative STEMI. EKG read by Navneet Ramos MD     Records Reviewed: Nursing Notes    Provider Notes (Medical Decision Making): DDx: Sepsis, most likely due to soft tissue infection on knee. Procedures:  Procedures    ED Course:   3:29 PM Initial assessment performed. The patients presenting problems have been discussed, and they are in agreement with the care plan formulated and outlined with them. I have encouraged them to ask questions as they arise throughout their visit.    4:02 PM CT scan without acute abnormalities.     SEPSIS ASSESSMENT NOTE:   4:03 PM  Oni Blackburn MD has seen and assessed the patient as follows:    Capillary Refill:normal/brisk  Cardiopulmonary Evaluation:   Lung Sounds: clear   Cardiac Sounds: regular  Peripheral Pulses: present  Skin Exam: skin unremarkable    Visit Vitals    BP (!) 133/95    Pulse 94    Temp 100 °F (37.8 °C)    Resp 18    Ht 5' 5\" (1.651 m)    Wt 93.1 kg (205 lb 4.8 oz)    SpO2 100%    BMI 34.16 kg/m2       Written by JOSE Ingramibmaryuri, as dictated by Keren Jimenez MD     5:47 PM Discussed patient's history, exam, and available diagnostics results with Getachew Hill MD, internal medicine, who agree with admitting pt to telemetry. 5:59 PM Discussed patient's history, exam, and available diagnostics results with Jose Galo DO, orthopedic surgery, who agree with plan to admit pt to Medical Floor. Diagnosis and Disposition     Critical Care Time:     Core Measures:  For Hospitalized Patients:    1. Hospitalization Decision Time:  The decision to hospitalize the patient was made by Keren Jimenez MD at 4:19 PM on 1/31/2018    2. Aspirin: Aspirin was not given because the patient did not present with a stroke at the time of their Emergency Department evaluation    4:19 PM  Patient is being admitted to the hospital by Getachew Hill MD. The results of their tests and reasons for their admission have been discussed with them and/or available family. They convey agreement and understanding for the need to be admitted and for their admission diagnosis. CONDITIONS ON ADMISSION:  Sepsis is present at the time of admission. Deep Vein Thrombosis is not present at the time of admission. Thrombosis is not present at the time of admission. Urinary Tract Infection is not present at the time of admission. Pneumonia is not present at the time of admission. MRSA is not present at the time of admission. Wound infection is not present at the time of admission. Pressure Ulcer is not present at the time of admission. CLINICAL IMPRESSION:    1.  Sepsis, due to unspecified organism [facility])    2. Pyogenic arthritis of right knee joint, due to unspecified organism ([address]        PLAN:  1. ADMIT  Admit for sepsis with presumed source of right knee. Ortho consulted. Admit to medicine. No distress. N/v intact. Low prob of TIA. Neg CT head. Well appearing. IMproved with IVF. Family updated of plan and comfortable with plan.  ___________________________    Attestations: This note is prepared by Lizandro Weiss, acting as Scribe for Tiesha Mcnair MD.    Tiesha Mcnair MD:  The scribe's documentation has been prepared under my direction and personally reviewed by me in its entirety.   I confirm that the note above accurately reflects all work, treatment, procedures, and medical decision making performed by me.  _______________________________

## 2018-01-31 NOTE — ED NOTES
Pt turned and cleaned of incontinence of urine. Clean chux and draw sheet placed beneath pt, urinal within reach. Pt in NAD at this time, calm and cooperative. Family at bedside. Side rails raised x 2.

## 2018-01-31 NOTE — ED TRIAGE NOTES
Pt brought from [facility] r/t [facility]. Per wife, pt experienced approximately 30 second of AMS while answering questions prior to procedure where he was looking blankly ahead and not responding to questions. ED charge nurse reports that [place] [facility] [place] brought to ED and Code S called by King Silva MD. FSBS 150 mg/dL. Sepsis Screening completed    ( X )Patient meets SIRS criteria. (  )Patient does not meet SIRS criteria.       SIRS Criteria is achieved when two or more of the following are present   Temperature < 96.8°F (36°C) or > 100.9°F (38.3°C)   Heart Rate > 90 beats per minute   Respiratory Rate > 20 breaths per minute   WBC count > 12,000 or <4,000 or > 10% bands

## 2018-01-31 NOTE — IP AVS SNAPSHOT
[address] [address] 
[phone] Patient: Steve Anand MRN: [id number] :1933 A check kurt indicates which time of day the medication should be taken. My Medications START taking these medications Instructions Each Dose to Equal  
 Morning Noon Evening Bedtime  
 ferrous sulfate 325 mg (65 mg iron) tablet Your last dose was: Your next dose is: Take 1 Tab by mouth two (2) times daily (with meals). 325 mg CHANGE how you take these medications Instructions Each Dose to Equal  
 Morning Noon Evening Bedtime  
 docusate sodium 100 mg capsule Commonly known as:  Demetri Carey What changed:   
- medication strength 
- how much to take - when to take this 
- reasons to take this Your last dose was: Your next dose is: Take 1 Cap by mouth two (2) times a day for 30 days. 100 mg CONTINUE taking these medications Instructions Each Dose to Equal  
 Morning Noon Evening Bedtime  
 enoxaparin 40 mg/0.4 mL Commonly known as:  LOVENOX Your last dose was: Your next dose is: 0.4 mL by SubCUTAneous route daily. 40 mg  
    
   
   
   
  
 traMADol 50 mg tablet Commonly known as:  ULTRAM  
   
Your last dose was: Your next dose is: Take 1 Tab by mouth every six (6) hours as needed for Pain. Max Daily Amount: 200 mg. Indications: NEUROPATHIC PAIN  
 50 mg ASK your doctor about these medications Instructions Each Dose to Equal  
 Morning Noon Evening Bedtime  
 alfuzosin SR 10 mg SR tablet Commonly known as:  Jesse Pizano Your last dose was: Your next dose is: Take 10 mg by mouth daily. Indications: Urolithiasis 10 mg  
    
   
   
   
  
 aspirin delayed-release 81 mg tablet Your last dose was: Your next dose is: Take 81 mg by mouth daily. 81 mg  
    
   
   
   
  
 atorvastatin 20 mg tablet Commonly known as:  LIPITOR Your last dose was: Your next dose is: Take 20 mg by mouth daily. 20 mg  
    
   
   
   
  
 bethanechol 25 mg tablet Commonly known as:  URECHOLINE Your last dose was: Your next dose is: Take 25 mg by mouth three (3) times daily. 25 mg  
    
   
   
   
  
 CRESTOR 10 mg tablet Generic drug:  rosuvastatin Your last dose was: Your next dose is: Take 10 mg by mouth daily. Indications: hyperlipidemia 10 mg  
    
   
   
   
  
 doxycycline 100 mg capsule Commonly known as:  VIBRAMYCIN Your last dose was: Your next dose is:    
   
   
 1 Cap two (2) times a day. 1 Cap DULCOLAX (BISACODYL) 5 mg EC tablet Generic drug:  bisacodyl Your last dose was: Your next dose is: Take 10 mg by mouth daily as needed for Constipation. Indications: constipation 10 mg  
    
   
   
   
  
 insulin lispro 100 unit/mL injection Commonly known as:  HUMALOG Your last dose was: Your next dose is:    
   
   
 0-5 Units by SubCUTAneous route four (4) times daily. Indications: type 2 diabetes mellitus 0-5 Units LASIX 20 mg tablet Generic drug:  furosemide Your last dose was: Your next dose is: Take 20 mg by mouth daily. Indications: Edema, h/o cellulitis 20 mg  
    
   
   
   
  
 lisinopril 5 mg tablet Commonly known as:  Horner Boor Your last dose was: Your next dose is: Take 5 mg by mouth daily. 5 mg LYRICA 150 mg capsule Generic drug:  pregabalin Your last dose was: Your next dose is: Take 150 mg by mouth daily. Indications: Diabetic Peripheral Neuropathy 150 mg MAVIK 2 mg tablet Generic drug:  trandolapril Your last dose was: Your next dose is: Take 2 mg by mouth daily. Indications: hypertension 2 mg NORVASC 5 mg tablet Generic drug:  amLODIPine Your last dose was: Your next dose is: Take 5 mg by mouth daily. Indications: hypertension 5 mg  
    
   
   
   
  
 oxyCODONE IR 5 mg immediate release tablet Commonly known as:  Padmaja Argueta Your last dose was: Your next dose is: Take 1-2 Tabs by mouth every four (4) hours as needed for Pain. Max Daily Amount: 60 mg.  
 5-10 mg  
    
   
   
   
  
 potassium chloride SR 8 mEq tablet Commonly known as:  KLOR-CON 8 Your last dose was: Your next dose is: Take 8 mEq by mouth daily. 8 mEq ZETIA 10 mg tablet Generic drug:  ezetimibe Your last dose was: Your next dose is: Take  by mouth nightly. Where to Get Your Medications Information on where to get these meds will be given to you by the nurse or doctor. ! Ask your nurse or doctor about these medications  
  docusate sodium 100 mg capsule  
 enoxaparin 40 mg/0.4 mL  
 ferrous sulfate 325 mg (65 mg iron) tablet  
 traMADol 50 mg tablet

## 2018-01-31 NOTE — IP AVS SNAPSHOT
Rene Fragoso 
 
 
 [address] [facility] [address] 
[phone] Patient: Ron Hooker MRN: [id number] :1933 About your hospitalization You were admitted on:  2018 You last received care in the:  [address] [place] [address] You were discharged on:  2018 Why you were hospitalized Your primary diagnosis was: Infected Prosthetic Knee Joint (Hcc) Your diagnoses also included:  Sepsis (Hcc), Diabetes Mellitus Type 2, Controlled (Hcc), Htn (Hypertension), Hyperlipidemia, Syncope, Influenza Follow-up Information Follow up With Details Comments Contact Info Lucho Riley MD   [address] [place] [address] Rd [address] [facility] [address] 
[phone] Devin Beck DO On 2018 Follow-up appointment @ 11:00 a.m. [address] [facility] and [address] [place] [address] [address] [facility] [address] 
[phone] Discharge Orders None A check kurt indicates which time of day the medication should be taken. My Medications START taking these medications Instructions Each Dose to Equal  
 Morning Noon Evening Bedtime  
 ferrous sulfate 325 mg (65 mg iron) tablet Your last dose was: Your next dose is: Take 1 Tab by mouth two (2) times daily (with meals). 325 mg CHANGE how you take these medications Instructions Each Dose to Equal  
 Morning Noon Evening Bedtime  
 docusate sodium 100 mg capsule Commonly known as:  Janet Gowers What changed:   
- medication strength 
- how much to take - when to take this 
- reasons to take this Your last dose was: Your next dose is: Take 1 Cap by mouth two (2) times a day for 30 days. 100 mg CONTINUE taking these medications  Instructions Each Dose to Equal  
 Morning Noon Evening Bedtime  
 enoxaparin 40 mg/0.4 mL  
 Commonly known as:  LOVENOX Your last dose was: Your next dose is: 0.4 mL by SubCUTAneous route daily. 40 mg  
    
   
   
   
  
 traMADol 50 mg tablet Commonly known as:  ULTRAM  
   
Your last dose was: Your next dose is: Take 1 Tab by mouth every six (6) hours as needed for Pain. Max Daily Amount: 200 mg. Indications: NEUROPATHIC PAIN  
 50 mg ASK your doctor about these medications Instructions Each Dose to Equal  
 Morning Noon Evening Bedtime  
 alfuzosin SR 10 mg SR tablet Commonly known as:  Robert Hendrickson Your last dose was: Your next dose is: Take 10 mg by mouth daily. Indications: Urolithiasis 10 mg  
    
   
   
   
  
 aspirin delayed-release 81 mg tablet Your last dose was: Your next dose is: Take 81 mg by mouth daily. 81 mg  
    
   
   
   
  
 atorvastatin 20 mg tablet Commonly known as:  LIPITOR Your last dose was: Your next dose is: Take 20 mg by mouth daily. 20 mg  
    
   
   
   
  
 bethanechol 25 mg tablet Commonly known as:  URECHOLINE Your last dose was: Your next dose is: Take 25 mg by mouth three (3) times daily. 25 mg  
    
   
   
   
  
 CRESTOR 10 mg tablet Generic drug:  rosuvastatin Your last dose was: Your next dose is: Take 10 mg by mouth daily. Indications: hyperlipidemia 10 mg  
    
   
   
   
  
 doxycycline 100 mg capsule Commonly known as:  VIBRAMYCIN Your last dose was: Your next dose is:    
   
   
 1 Cap two (2) times a day. 1 Cap DULCOLAX (BISACODYL) 5 mg EC tablet Generic drug:  bisacodyl Your last dose was: Your next dose is: Take 10 mg by mouth daily as needed for Constipation. Indications: constipation  10 mg  
    
   
   
   
  
 insulin lispro 100 unit/mL injection Commonly known as:  HUMALOG Your last dose was: Your next dose is:    
   
   
 0-5 Units by SubCUTAneous route four (4) times daily. Indications: type 2 diabetes mellitus 0-5 Units LASIX 20 mg tablet Generic drug:  furosemide Your last dose was: Your next dose is: Take 20 mg by mouth daily. Indications: Edema, h/o cellulitis 20 mg  
    
   
   
   
  
 lisinopril 5 mg tablet Commonly known as:  Madonna Cliche Your last dose was: Your next dose is: Take 5 mg by mouth daily. 5 mg LYRICA 150 mg capsule Generic drug:  pregabalin Your last dose was: Your next dose is: Take 150 mg by mouth daily. Indications: Diabetic Peripheral Neuropathy 150 mg MAVIK 2 mg tablet Generic drug:  trandolapril Your last dose was: Your next dose is: Take 2 mg by mouth daily. Indications: hypertension 2 mg NORVASC 5 mg tablet Generic drug:  amLODIPine Your last dose was: Your next dose is: Take 5 mg by mouth daily. Indications: hypertension 5 mg  
    
   
   
   
  
 oxyCODONE IR 5 mg immediate release tablet Commonly known as:  Hermann Bowie Your last dose was: Your next dose is: Take 1-2 Tabs by mouth every four (4) hours as needed for Pain. Max Daily Amount: 60 mg.  
 5-10 mg  
    
   
   
   
  
 potassium chloride SR 8 mEq tablet Commonly known as:  KLOR-CON 8 Your last dose was: Your next dose is: Take 8 mEq by mouth daily. 8 mEq ZETIA 10 mg tablet Generic drug:  ezetimibe Your last dose was: Your next dose is: Take  by mouth nightly. Where to Get Your Medications Information on where to get these meds will be given to you by the nurse or doctor. ! Ask your nurse or doctor about these medications  
  docusate sodium 100 mg capsule  
 enoxaparin 40 mg/0.4 mL  
 ferrous sulfate 325 mg (65 mg iron) tablet  
 traMADol 50 mg tablet Discharge Instructions Sepsis: Care Instructions Your Care Instructions Sepsis is an infection that has spread throughout your body. It is a life-threatening condition and often causes extremely low blood pressure. This can lead to problems with many different organs. The cause of sepsis is not always clear, but it can happen as part of a long-term or sudden illness. Sometimes even a mild illness can lead to sepsis. Follow-up care is a key part of your treatment and safety. Be sure to make and go to all appointments, and call your doctor if you are having problems. It's also a good idea to know your test results and keep a list of the medicines you take. How can you care for yourself at home? · If your doctor prescribed antibiotics, take them as directed. Do not stop taking them just because you feel better. You need to take the full course of antibiotics. · Drink plenty of fluids, enough so that your urine is light yellow or clear like water. Choose water or caffeine-free clear liquids until you feel better. If you have kidney, heart, or liver disease and have to limit fluids, talk with your doctor before you increase your fluid intake. You can try rehydration drinks, such as Gatorade or Powerade. · Do not drink alcohol. · Eat a healthy diet. Include fruits, vegetables, and whole grains in your diet every day. · Walking is an easy way to get exercise. Gradually increase the amount you walk every day. Make sure your doctor knows that you are starting an exercise program. 
· Do not smoke or use other tobacco products.  If you need help quitting, talk to your doctor about stop-smoking programs and medicines. These can increase your chances of quitting for good. When should you call for help? Call 911 anytime you think you may need emergency care. For example, call if: 
? · You passed out (lost consciousness). ?Call your doctor now or seek immediate medical care if: 
? · You have a fever or chills. ? · You have cool, pale, or clammy skin. ? · You are dizzy or lightheaded, or you feel like you may faint. ? · You have any new symptoms, such as a cough, pain in one part of your body, or urinary problems. ? Watch closely for changes in your health, and be sure to contact your doctor if: 
? · You do not get better as expected. Where can you learn more? Go to http://chasidy-jayme.info/. Enter I496 in the search box to learn more about \"Sepsis: Care Instructions. \" Current as of: March 20, 2017 Content Version: 11.4 © 7465-2299 [employer]. Care instructions adapted under license by [employer] (which disclaims liability or warranty for this information). If you have questions about a medical condition or this instruction, always ask your healthcare professional. [place] [address] any warranty or liability for your use of this information. Influenza (Flu): Care Instructions Your Care Instructions Influenza (flu) is an infection in the lungs and breathing passages. It is caused by the influenza virus. There are different strains, or types, of the flu virus from year to year. Unlike the common cold, the flu comes on suddenly and the symptoms, such as a cough, congestion, fever, chills, fatigue, aches, and pains, are more severe. These symptoms may last up to 10 days. Although the flu can make you feel very sick, it usually doesn't cause serious health problems. Home treatment is usually all you need for flu symptoms.  But your doctor may prescribe antiviral medicine to prevent other health problems, such as pneumonia, from developing. Older people and those who have a long-term health condition, such as lung disease, are most at risk for having pneumonia or other health problems. Follow-up care is a key part of your treatment and safety. Be sure to make and go to all appointments, and call your doctor if you are having problems. It's also a good idea to know your test results and keep a list of the medicines you take. How can you care for yourself at home? · Get plenty of rest. 
· Drink plenty of fluids, enough so that your urine is light yellow or clear like water. If you have kidney, heart, or liver disease and have to limit fluids, talk with your doctor before you increase the amount of fluids you drink. · Take an over-the-counter pain medicine if needed, such as acetaminophen (Tylenol), ibuprofen (Advil, Motrin), or naproxen (Aleve), to relieve fever, headache, and muscle aches. Read and follow all instructions on the label. No one younger than 20 should take aspirin. It has been linked to Reye syndrome, a serious illness. · Do not smoke. Smoking can make the flu worse. If you need help quitting, talk to your doctor about stop-smoking programs and medicines. These can increase your chances of quitting for good. · Breathe moist air from a hot shower or from a sink filled with hot water to help clear a stuffy nose. · Before you use cough and cold medicines, check the label. These medicines may not be safe for young children or for people with certain health problems. · If the skin around your nose and lips becomes sore, put some petroleum jelly on the area. · To ease coughing: ¨ Drink fluids to soothe a scratchy throat. ¨ Suck on cough drops or plain hard candy. ¨ Take an over-the-counter cough medicine that contains dextromethorphan to help you get some sleep. Read and follow all instructions on the label. ¨ Raise your head at night with an extra pillow. This may help you rest if coughing keeps you awake. · Take any prescribed medicine exactly as directed. Call your doctor if you think you are having a problem with your medicine. To avoid spreading the flu · Wash your hands regularly, and keep your hands away from your face. · Stay home from school, work, and other public places until you are feeling better and your fever has been gone for at least 24 hours. The fever needs to have gone away on its own without the help of medicine. · Ask people living with you to talk to their doctors about preventing the flu. They may get antiviral medicine to keep from getting the flu from you. · To prevent the flu in the future, get a flu vaccine every fall. Encourage people living with you to get the vaccine. · Cover your mouth when you cough or sneeze. When should you call for help? Call 911 anytime you think you may need emergency care. For example, call if: 
? · You have severe trouble breathing. ?Call your doctor now or seek immediate medical care if: 
? · You have new or worse trouble breathing. ? · You seem to be getting much sicker. ? · You feel very sleepy or confused. ? · You have a new or higher fever. ? · You get a new rash. ? Watch closely for changes in your health, and be sure to contact your doctor if: 
? · You begin to get better and then get worse. ? · You are not getting better after 1 week. Where can you learn more? Go to http://chasidy-jayme.info/. Enter P419 in the search box to learn more about \"Influenza (Flu): Care Instructions. \" Current as of: May 12, 2017 Content Version: 11.4 © 9999-5038 [employer]. Care instructions adapted under license by [employer] (which disclaims liability or warranty for this information).  If you have questions about a medical condition or this instruction, always ask your healthcare professional. [employer] disclaims any warranty or liability for your use of this information. Introducing [facility]! Jason Brock introduces Learndot patient portal. Now you can access parts of your medical record, email your doctor's office, and request medication refills online. 1. In your internet browser, go to [email or web address] 2. Click on the First Time User? Click Here link in the Sign In box. You will see the New Member Sign Up page. 3. Enter your Learndot Access Code exactly as it appears below. You will not need to use this code after youve completed the sign-up process. If you do not sign up before the expiration date, you must request a new code. · Learndot Access Code: [email or web address] Expires: 2/14/2018 11:41 AM 
 
4. Enter the last four digits of your Social Security Number (xxxx) and Date of Birth (mm/dd/yyyy) as indicated and click Submit. You will be taken to the next sign-up page. 5. Create a Learndot ID. This will be your Learndot login ID and cannot be changed, so think of one that is secure and easy to remember. 6. Create a Learndot password. You can change your password at any time. 7. Enter your Password Reset Question and Answer. This can be used at a later time if you forget your password. 8. Enter your e-mail address. You will receive e-mail notification when new information is available in [address] 9. Click Sign Up. You can now view and download portions of your medical record. 10. Click the Download Summary menu link to download a portable copy of your medical information. If you have questions, please visit the Frequently Asked Questions section of the Learndot website. Remember, Learndot is NOT to be used for urgent needs. For medical emergencies, dial 911. Now available from your iPhone and Android! Unresulted Labs-Please follow up with your PCP about these lab tests Order Current Status CULTURE, ANAEROBIC Preliminary result CULTURE, ANAEROBIC Preliminary result CULTURE, ANAEROBIC Preliminary result CULTURE, ANAEROBIC Preliminary result CULTURE, BLOOD Preliminary result CULTURE, BODY FLUID W GRAM STAIN Preliminary result CULTURE, TISSUE W GRAM STAIN Preliminary result CULTURE, TISSUE W GRAM STAIN Preliminary result CULTURE, WOUND W GRAM STAIN Preliminary result EKG, 12 LEAD, INITIAL Preliminary result EKG, 12 LEAD, SUBSEQUENT Preliminary result Providers Seen During Your Hospitalization Provider Specialty Primary office phone Tanisha Arora MD Emergency Medicine [phone] Teddy Gautam MD Internal Medicine [phone] Your Primary Care Physician (PCP) Primary Care Physician Office Phone Office Fax Rogers Estevez [address] You are allergic to the following Allergen Reactions Oxycodone Other (comments)  
 :makes him crazy per wife Recent Documentation Height Weight BMI Smoking Status 1.651 m 98.7 kg 36.21 kg/m2 Never Smoker Emergency Contacts Name Discharge Info Relation Home Work Mobile [facility] CAREGIVER [3] Spouse [3] [phone] Patient Belongings The following personal items are in your possession at time of discharge: 
  Dental Appliances: None  Visual Aid: None   Hearing Aids/Status: At home  Home Medications: None   Jewelry: None  Clothing: Footwear, Shirt, Jacket/Coat    Other Valuables: Splint  Personal Items Sent to Safe: none Please provide this summary of care documentation to your next provider. Signatures-by signing, you are acknowledging that this After Visit Summary has been reviewed with you and you have received a copy. Patient Signature:  ____________________________________________________________ Date:  ____________________________________________________________ `    
    
 Provider Signature:  ____________________________________________________________ Date:  ____________________________________________________________

## 2018-01-31 NOTE — PERIOP NOTES
Pt. Denies personal or family history of problems with anesthesia. Pt is partially dependent. Pt was diagnosed with sleep apnea about 5-10 yers ago. Refused CPAP. No treatment received. During the interview, pt became unresponsive, head fell to chest, aroused for approx 2 seconds, lifted head and became unresponsive once again. Susy Rogers called. Team in. Vitals checked, ER RN at pt's side and pt transferred to ER via wheelchair. Interview completed up until questions about sleep apnea. Teaching not done. Earlier, had called Nick Rodas at Dr. Larissa Tobar office and informed her that pt was on Lovenox and ASA and needed instructions by his physician on management. Pt is currently staying in Rehab Unit in [place] [address] (was [place]).

## 2018-02-01 ENCOUNTER — APPOINTMENT (OUTPATIENT)
Dept: CT IMAGING | Age: 83
DRG: 463 | End: 2018-02-01
Attending: HOSPITALIST
Payer: MEDICARE

## 2018-02-01 ENCOUNTER — ANESTHESIA EVENT (OUTPATIENT)
Dept: SURGERY | Age: 83
DRG: 463 | End: 2018-02-01
Payer: MEDICARE

## 2018-02-01 LAB
ANION GAP SERPL CALC-SCNC: 12 MMOL/L (ref 3–18)
BACTERIA SPEC CULT: NORMAL
BUN SERPL-MCNC: 16 MG/DL (ref 7–18)
BUN/CREAT SERPL: 16 (ref 12–20)
CALCIUM SERPL-MCNC: 8.4 MG/DL (ref 8.5–10.1)
CHLORIDE SERPL-SCNC: 105 MMOL/L (ref 100–108)
CO2 SERPL-SCNC: 24 MMOL/L (ref 21–32)
CREAT SERPL-MCNC: 1 MG/DL (ref 0.6–1.3)
ERYTHROCYTE [DISTWIDTH] IN BLOOD BY AUTOMATED COUNT: 18.9 % (ref 11.6–14.5)
FLUAV AG NPH QL IA: POSITIVE
FLUBV AG NOSE QL IA: NEGATIVE
GLUCOSE BLD STRIP.AUTO-MCNC: 226 MG/DL (ref 70–110)
GLUCOSE SERPL-MCNC: 130 MG/DL (ref 74–99)
HCT VFR BLD AUTO: 30 % (ref 36–48)
HGB BLD-MCNC: 9.6 G/DL (ref 13–16)
MCH RBC QN AUTO: 27 PG (ref 24–34)
MCHC RBC AUTO-ENTMCNC: 32 G/DL (ref 31–37)
MCV RBC AUTO: 84.3 FL (ref 74–97)
PLATELET # BLD AUTO: 174 K/UL (ref 135–420)
PMV BLD AUTO: 9.5 FL (ref 9.2–11.8)
POTASSIUM SERPL-SCNC: 3.8 MMOL/L (ref 3.5–5.5)
RBC # BLD AUTO: 3.56 M/UL (ref 4.7–5.5)
SERVICE CMNT-IMP: NORMAL
SODIUM SERPL-SCNC: 141 MMOL/L (ref 136–145)
WBC # BLD AUTO: 3.8 K/UL (ref 4.6–13.2)

## 2018-02-01 PROCEDURE — 86900 BLOOD TYPING SEROLOGIC ABO: CPT | Performed by: ORTHOPAEDIC SURGERY

## 2018-02-01 PROCEDURE — 86920 COMPATIBILITY TEST SPIN: CPT | Performed by: ORTHOPAEDIC SURGERY

## 2018-02-01 PROCEDURE — 74011250637 HC RX REV CODE- 250/637: Performed by: PHYSICIAN ASSISTANT

## 2018-02-01 PROCEDURE — 74011250637 HC RX REV CODE- 250/637: Performed by: INTERNAL MEDICINE

## 2018-02-01 PROCEDURE — 74011000250 HC RX REV CODE- 250: Performed by: INTERNAL MEDICINE

## 2018-02-01 PROCEDURE — 74011250637 HC RX REV CODE- 250/637: Performed by: HOSPITALIST

## 2018-02-01 PROCEDURE — 74011250636 HC RX REV CODE- 250/636: Performed by: EMERGENCY MEDICINE

## 2018-02-01 PROCEDURE — 85027 COMPLETE CBC AUTOMATED: CPT | Performed by: EMERGENCY MEDICINE

## 2018-02-01 PROCEDURE — C8929 TTE W OR WO FOL WCON,DOPPLER: HCPCS

## 2018-02-01 PROCEDURE — 74011250636 HC RX REV CODE- 250/636: Performed by: INTERNAL MEDICINE

## 2018-02-01 PROCEDURE — 65660000000 HC RM CCU STEPDOWN

## 2018-02-01 PROCEDURE — 87804 INFLUENZA ASSAY W/OPTIC: CPT | Performed by: PHYSICIAN ASSISTANT

## 2018-02-01 PROCEDURE — 36415 COLL VENOUS BLD VENIPUNCTURE: CPT | Performed by: EMERGENCY MEDICINE

## 2018-02-01 PROCEDURE — 77030027138 HC INCENT SPIROMETER -A

## 2018-02-01 PROCEDURE — 80048 BASIC METABOLIC PNL TOTAL CA: CPT | Performed by: EMERGENCY MEDICINE

## 2018-02-01 PROCEDURE — 71275 CT ANGIOGRAPHY CHEST: CPT

## 2018-02-01 PROCEDURE — 93880 EXTRACRANIAL BILAT STUDY: CPT

## 2018-02-01 PROCEDURE — 82962 GLUCOSE BLOOD TEST: CPT

## 2018-02-01 PROCEDURE — 74011636320 HC RX REV CODE- 636/320: Performed by: INTERNAL MEDICINE

## 2018-02-01 PROCEDURE — 74011000250 HC RX REV CODE- 250: Performed by: EMERGENCY MEDICINE

## 2018-02-01 RX ORDER — AMLODIPINE BESYLATE 5 MG/1
5 TABLET ORAL DAILY
Status: DISCONTINUED | OUTPATIENT
Start: 2018-02-02 | End: 2018-02-02

## 2018-02-01 RX ORDER — OSELTAMIVIR PHOSPHATE 75 MG/1
75 CAPSULE ORAL 2 TIMES DAILY
Status: COMPLETED | OUTPATIENT
Start: 2018-02-01 | End: 2018-02-06

## 2018-02-01 RX ORDER — ACETAMINOPHEN 325 MG/1
650 TABLET ORAL
Status: DISCONTINUED | OUTPATIENT
Start: 2018-02-01 | End: 2018-02-01

## 2018-02-01 RX ORDER — TRAMADOL HYDROCHLORIDE 50 MG/1
50 TABLET ORAL
Status: DISCONTINUED | OUTPATIENT
Start: 2018-02-01 | End: 2018-02-07 | Stop reason: HOSPADM

## 2018-02-01 RX ORDER — TRAZODONE HYDROCHLORIDE 50 MG/1
50 TABLET ORAL
Status: DISCONTINUED | OUTPATIENT
Start: 2018-02-01 | End: 2018-02-07 | Stop reason: HOSPADM

## 2018-02-01 RX ORDER — ACETAMINOPHEN 325 MG/1
650 TABLET ORAL
Status: DISCONTINUED | OUTPATIENT
Start: 2018-02-01 | End: 2018-02-07 | Stop reason: HOSPADM

## 2018-02-01 RX ORDER — MORPHINE SULFATE 4 MG/ML
1 INJECTION, SOLUTION INTRAMUSCULAR; INTRAVENOUS
Status: COMPLETED | OUTPATIENT
Start: 2018-02-01 | End: 2018-02-01

## 2018-02-01 RX ORDER — OSELTAMIVIR PHOSPHATE 75 MG/1
75 CAPSULE ORAL 2 TIMES DAILY
Status: DISCONTINUED | OUTPATIENT
Start: 2018-02-01 | End: 2018-02-01

## 2018-02-01 RX ADMIN — OSELTAMIVIR PHOSPHATE 75 MG: 75 CAPSULE ORAL at 18:13

## 2018-02-01 RX ADMIN — BETHANECHOL CHLORIDE 25 MG: 10 TABLET ORAL at 22:21

## 2018-02-01 RX ADMIN — ACETAMINOPHEN 650 MG: 325 TABLET ORAL at 15:51

## 2018-02-01 RX ADMIN — TRAZODONE HYDROCHLORIDE 50 MG: 50 TABLET ORAL at 22:22

## 2018-02-01 RX ADMIN — WATER 2 G: 1 INJECTION INTRAMUSCULAR; INTRAVENOUS; SUBCUTANEOUS at 11:47

## 2018-02-01 RX ADMIN — IOPAMIDOL 100 ML: 755 INJECTION, SOLUTION INTRAVENOUS at 10:49

## 2018-02-01 RX ADMIN — ATORVASTATIN CALCIUM 20 MG: 20 TABLET, FILM COATED ORAL at 11:45

## 2018-02-01 RX ADMIN — SODIUM CHLORIDE 1 ML: 9 INJECTION INTRAMUSCULAR; INTRAVENOUS; SUBCUTANEOUS at 09:44

## 2018-02-01 RX ADMIN — MORPHINE SULFATE 1 MG: 4 INJECTION, SOLUTION INTRAMUSCULAR; INTRAVENOUS at 22:18

## 2018-02-01 RX ADMIN — TRAMADOL HYDROCHLORIDE 50 MG: 50 TABLET, FILM COATED ORAL at 18:46

## 2018-02-01 RX ADMIN — BETHANECHOL CHLORIDE 25 MG: 10 TABLET ORAL at 17:38

## 2018-02-01 RX ADMIN — ALFUZOSIN HYDROCHLORIDE 10 MG: 10 TABLET ORAL at 11:47

## 2018-02-01 RX ADMIN — BETHANECHOL CHLORIDE 25 MG: 10 TABLET ORAL at 07:00

## 2018-02-01 RX ADMIN — SODIUM CHLORIDE 125 ML/HR: 900 INJECTION, SOLUTION INTRAVENOUS at 17:33

## 2018-02-01 RX ADMIN — LISINOPRIL 5 MG: 5 TABLET ORAL at 11:45

## 2018-02-01 RX ADMIN — WATER 2 G: 1 INJECTION INTRAMUSCULAR; INTRAVENOUS; SUBCUTANEOUS at 23:46

## 2018-02-01 RX ADMIN — SODIUM CHLORIDE 1000 MG: 900 INJECTION, SOLUTION INTRAVENOUS at 17:31

## 2018-02-01 RX ADMIN — VANCOMYCIN HYDROCHLORIDE 750 MG: 750 INJECTION, SOLUTION INTRAVENOUS at 05:12

## 2018-02-01 NOTE — PROGRESS NOTES
1925 TRANSFER - IN REPORT:    Verbal report received from Jordyn CONNORS(name) on [facility]  being received from ED(unit) for routine progression of care      Report consisted of patients Situation, Background, Assessment and   Recommendations(SBAR). Information from the following report(s) SBAR, ED Summary, Intake/Output, MAR, Recent Results and Cardiac Rhythm NSR was reviewed with the receiving nurse. Opportunity for questions and clarification was provided. Assessment completed upon patients arrival to unit and care assumed. 1930 Assessment completed and documented in flow sheet. Pt denies any further needs at this time. Pt in NAD with bed in low position, wheels locked and call bell within reach. Dime size open area noted to (L) sacrum. Mepilex applied. 2131 Scheduled medications administered as ordered. 2245 Shift summary: Patient spent uneventful shift. No issues noted. See flow sheet and MAR.    2315 Bedside and Verbal shift change report given to Ron Goldsmith RN (oncoming nurse) by Mary Hutton RN (offgoing nurse). Report included the following information SBAR, Intake/Output, MAR and Recent Results.

## 2018-02-01 NOTE — PROGRESS NOTES
Assumed care. Bedside report received from [facility]. Pt resting in bed watching TV. Anxiety noted related  to planned surgery time. Spouse at the bedside No major concerns at this point.  Will continue to monitor

## 2018-02-01 NOTE — PROGRESS NOTES
Pharmacy Dosing Services: Vancomycin    Consult for Vancomycin Dosing by Pharmacy by Dr. Abiel Suarez provided for this 80y.o. year old male , for indication of prosthetic joint infection. Day of Therapy 2  Scr = 1  CrCl = 57 ml/min   WBC = 3.8     Maintenance dose adjusted to:  Vancomycin 1000 mg IV every 12 hours  Dose adjusted to more closely achieve therapeutic goal trough: 15 - 20     Ht Readings from Last 1 Encounters:   01/31/18 165.1 cm (65\")        Wt Readings from Last 1 Encounters:   01/31/18 93.1 kg (205 lb 4.8 oz)      Previous Regimen Vancomycin 750 mg IV q12h   Other Current Antibiotics Cefepime 2 grams IV q12h   Significant Cultures pending   Serum Creatinine Lab Results   Component Value Date/Time    Creatinine 1.00 02/01/2018 05:30 AM      Creatinine Clearance Estimated Creatinine Clearance: 57.6 mL/min (based on Cr of 1). BUN Lab Results   Component Value Date/Time    BUN 16 02/01/2018 05:30 AM      WBC Lab Results   Component Value Date/Time    WBC 3.8 02/01/2018 05:30 AM      H/H Lab Results   Component Value Date/Time    HGB 9.6 02/01/2018 05:30 AM      Platelets Lab Results   Component Value Date/Time    PLATELET 912 29/53/6618 05:30 AM      Temp 100 °F (37.8 °C)     Pharmacy to follow daily and will make changes to dose and/or frequency based on clinical status.   Pharmacist Andrew Guevara

## 2018-02-01 NOTE — CONSULTS
Consult Note    Patient: Magda Weiss               Sex: male          DOA: 1/31/2018         YOB: 1933      Age:  80 y.o.        LOS:  LOS: 1 day              HPI:     Magda Weiss is a 80 y.o. male who has been seen for right knee infection. Patient was in hospital for preop testing as he is already scheduled for right knee I&D washout, with explant of prosthetic and antibiotic spacer with Dr Aaron Diaz. While at hospital for testing, patient had an episode of  AMS and was sent to ER for workup for syncopal episode and sepsis. Past Medical History:   Diagnosis Date    Arthritis     Coagulation disorder ([facility] [address] [address]     Lovenox    Diabetes ([facility] [address] 2005    diet controlled, on insulin at this time (1/31/18)    Hypercholesteremia     Hypertension 2005    Sleep apnea 2008    no treatment    Urinary urgency        Past Surgical History:   Procedure Laterality Date    HX APPENDECTOMY      HX BACK SURGERY  11/2017    X 3-last surgery 11/2017    HX BACK SURGERY  1990's    cervcal neck    HX CATARACT REMOVAL      bilateral    HX ORTHOPAEDIC  2013    right knee replacement    HX ORTHOPAEDIC  05/2017    ligament repair    HX VASECTOMY         History reviewed. No pertinent family history. Social History     Social History    Marital status:      Spouse name: N/A    Number of children: N/A    Years of education: N/A     Social History Main Topics    Smoking status: Never Smoker    Smokeless tobacco: Never Used    Alcohol use No      Comment:      Drug use: No    Sexual activity: Not Currently     Other Topics Concern    None     Social History Narrative       Prior to Admission medications    Medication Sig Start Date End Date Taking? Authorizing Provider   alfuzosin SR (UROXATRAL) 10 mg SR tablet Take 10 mg by mouth daily. Indications: Urolithiasis 11/22/17   Historical Provider   atorvastatin (LIPITOR) 20 mg tablet Take 20 mg by mouth daily.     Historical Provider   bethanechol (URECHOLINE) 25 mg tablet Take 25 mg by mouth three (3) times daily. 11/21/17   Historical Provider   bisacodyl (DULCOLAX, BISACODYL,) 5 mg EC tablet Take 10 mg by mouth daily as needed for Constipation. Indications: constipation    Historical Provider   doxycycline (VIBRAMYCIN) 100 mg capsule 1 Cap two (2) times a day. 1/13/18   Historical Provider   DOCUSATE SODIUM (DSS PO) Take 2 Caps by mouth two (2) times daily as needed. Historical Provider   enoxaparin (LOVENOX) 40 mg/0.4 mL 40 mg by SubCUTAneous route daily. 11/22/17   Historical Provider   insulin lispro (HUMALOG) 100 unit/mL injection 0-5 Units by SubCUTAneous route four (4) times daily. Indications: type 2 diabetes mellitus    Historical Provider   lisinopril (PRINIVIL, ZESTRIL) 5 mg tablet Take 5 mg by mouth daily. Historical Provider   traMADol (ULTRAM) 50 mg tablet Take 50 mg by mouth every six (6) hours as needed for Pain. Indications: NEUROPATHIC PAIN    Historical Provider   aspirin delayed-release 81 mg tablet Take 81 mg by mouth daily. Historical Provider   oxyCODONE IR (ROXICODONE) 5 mg immediate release tablet Take 1-2 Tabs by mouth every four (4) hours as needed for Pain. Max Daily Amount: 60 mg. 5/11/17   Robin Roque PA-C   trandolapril (MAVIK) 2 mg tablet Take 2 mg by mouth daily. Indications: hypertension    Historical Provider   ezetimibe (ZETIA) 10 mg tablet Take  by mouth nightly. Historical Provider   rosuvastatin (CRESTOR) 10 mg tablet Take 10 mg by mouth daily. Indications: hyperlipidemia    Historical Provider   pregabalin (LYRICA) 150 mg capsule Take 150 mg by mouth daily. Indications: Diabetic Peripheral Neuropathy    Historical Provider   potassium chloride SR (KLOR-CON 8) 8 mEq tablet Take 8 mEq by mouth daily. Historical Provider   amLODIPine (NORVASC) 5 mg tablet Take 5 mg by mouth daily. Indications: hypertension    Historical Provider   furosemide (LASIX) 20 mg tablet Take 20 mg by mouth daily. Indications: Edema, h/o cellulitis    Historical Provider       Allergies   Allergen Reactions    Oxycodone Other (comments)     :makes him crazy per wife       Review of Systems  Pertinent items are noted in the History of Present Illness. Physical Exam:      Visit Vitals    /62    Pulse 93    Temp (!) 101.9 °F (38.8 °C)    Resp 19    Ht 5' 5\" (1.651 m)    Wt 93.1 kg (205 lb 4.8 oz)    SpO2 96%    BMI 34.16 kg/m2       Physical Exam:  General: Alert and Oriented X 3  Lungs: Clear to ausculation bilaterally  Cardiovascular: Regular Rate and Rhythm identified by peripheral pulses.   Abdomen: Soft, nontender in all four quadrants  Gential/Rectal: deferred  Musculoskeletal: Right knee immobilizer in place    Labs Reviewed:  BMP:   Lab Results   Component Value Date/Time     02/01/2018 05:30 AM    K 3.8 02/01/2018 05:30 AM     02/01/2018 05:30 AM    CO2 24 02/01/2018 05:30 AM    AGAP 12 02/01/2018 05:30 AM     (H) 02/01/2018 05:30 AM    BUN 16 02/01/2018 05:30 AM    CREA 1.00 02/01/2018 05:30 AM    GFRAA >60 02/01/2018 05:30 AM    GFRNA >60 02/01/2018 05:30 AM     CMP:   Lab Results   Component Value Date/Time     02/01/2018 05:30 AM    K 3.8 02/01/2018 05:30 AM     02/01/2018 05:30 AM    CO2 24 02/01/2018 05:30 AM    AGAP 12 02/01/2018 05:30 AM     (H) 02/01/2018 05:30 AM    BUN 16 02/01/2018 05:30 AM    CREA 1.00 02/01/2018 05:30 AM    GFRAA >60 02/01/2018 05:30 AM    GFRNA >60 02/01/2018 05:30 AM    CA 8.4 (L) 02/01/2018 05:30 AM    MG 1.5 (L) 01/31/2018 04:16 PM    ALB 2.4 (L) 01/31/2018 04:16 PM    TP 7.7 01/31/2018 04:16 PM    GLOB 5.3 (H) 01/31/2018 04:16 PM    AGRAT 0.5 (L) 01/31/2018 04:16 PM    SGOT 37 01/31/2018 04:16 PM    ALT 39 01/31/2018 04:16 PM     CBC:   Lab Results   Component Value Date/Time    WBC 3.8 (L) 02/01/2018 05:30 AM    HGB 9.6 (L) 02/01/2018 05:30 AM    HCT 30.0 (L) 02/01/2018 05:30 AM     02/01/2018 05:30 AM     Recent Glucose Results:   Lab Results   Component Value Date/Time     (H) 02/01/2018 05:30 AM     (H) 01/31/2018 04:16 PM     COAGS:   Lab Results   Component Value Date/Time    PTP 14.4 01/31/2018 04:16 PM    INR 1.2 01/31/2018 04:16 PM     Liver Panel:   Lab Results   Component Value Date/Time    ALB 2.4 (L) 01/31/2018 04:16 PM    CBIL 0.2 01/31/2018 04:16 PM    TP 7.7 01/31/2018 04:16 PM    GLOB 5.3 (H) 01/31/2018 04:16 PM    AGRAT 0.5 (L) 01/31/2018 04:16 PM    SGOT 37 01/31/2018 04:16 PM    ALT 39 01/31/2018 04:16 PM    AP 85 01/31/2018 04:16 PM     Pancreatic Markers: No results found for: AMYLPOCT, AML, LIPPOCT, LPSE         Assessment/Plan     Principal Problem:    Sepsis ([address] (1/31/2018)    Active Problems:    Diabetes mellitus type 2, controlled ([address] (5/9/2017)      Hyperlipidemia (5/9/2017)      HTN (hypertension) (5/9/2017)      Infected prosthetic knee joint ([address] (1/31/2018)      Syncope (1/31/2018)      Pending blood cultures final result, echo & carotid us   Plan for explant, antibiotic spacer, I&D washout of Right knee tomorrow 2/2/18  NPO after midnight  Type & Screen

## 2018-02-01 NOTE — PROGRESS NOTES
Shift Summary:  Uneventful shift. Patient denied pain and discomfort overnight. Right leg immobilizer in place. BLE nonpitting edema, 3+. Pulses and sensation present, pale. Cap refill bilaterally less than 3 seconds.        Patient Vitals for the past 12 hrs:   Temp Pulse Resp BP SpO2   01/31/18 2251 99.6 °F (37.6 °C) 90 19 168/68 99 %   01/31/18 2002 99.3 °F (37.4 °C) 92 20 155/57 97 %   01/31/18 1857 99.3 °F (37.4 °C) 97 20 154/58 100 %   01/31/18 1856 - 99 17 154/58 98 %

## 2018-02-01 NOTE — CONSULTS
History & Physical    Patient: Radha Snow MRN: [id number]  CSN: [id number]    YOB: 1933  Age: 80 y.o. Sex: male      DOA: 1/31/2018  Primary Care Provider:  Becky Mayorga MD      Assessment/Plan     Patient Active Problem List   Diagnosis Code    Diabetes mellitus type 2, controlled ([facility] [address] E11.9    Hyperlipidemia E78.5    HTN (hypertension) I10    Rupture of right quadriceps tendon S76.111A    Sepsis ([facility] [address] A41.9       Sepsis. Syncope. Infected prosthetic joint. HTN  Dyslipidemia.      -Admit.  -Sepsis protocol order sets.  -Continue  Cefepime and vancomycin for now. -Orthopaedics to evaluate.    -Hydrated. -Check echo and carotid us to w/u syncope. -Syncopal episode may have been part of this sepsis syndrome or perhaps even a fleeting drop in BP.    -DVT px. Estimated length of stay :2-4d    CC: syncope       HPI:     Radha Snow is a 80 y.o. male who ws referred to the ER from preop testing. Had an episode where he passed out briefly. No fall. Was sitting in a chair. Went blank and was briefly unresponsive. No seizure activity. No hx of prior episodes like this. Brought to ER. In ER manifested a temperature of 103f and had some tachycardia. Currently has a right knee prosthetic joint. He is thought to have an infected joint. He was going to preop testing to get ready for removal of the hardware and placement of an antibiotic spacer. Follows with angeline Hernandez.      Past Medical History:   Diagnosis Date    Arthritis     Coagulation disorder ([facility] [address]     Lovenox    Diabetes ([facility] [address] 2005    diet controlled, on insulin at this time (1/31/18)    Hypercholesteremia     Hypertension 2005    Sleep apnea 2008    no treatment    Urinary urgency        Past Surgical History:   Procedure Laterality Date    HX APPENDECTOMY      HX BACK SURGERY  11/2017    X 3-last surgery 11/2017    HX BACK SURGERY  1990's    cervcal neck    HX CATARACT REMOVAL      bilateral    HX ORTHOPAEDIC  2013    right knee replacement    HX ORTHOPAEDIC  05/2017    ligament repair    HX VASECTOMY         History reviewed. No pertinent family history. Social History     Social History    Marital status:      Spouse name: N/A    Number of children: N/A    Years of education: N/A     Social History Main Topics    Smoking status: Never Smoker    Smokeless tobacco: Never Used    Alcohol use No      Comment:      Drug use: No    Sexual activity: Not Currently     Other Topics Concern    None     Social History Narrative       Prior to Admission medications    Medication Sig Start Date End Date Taking? Authorizing Provider   alfuzosin SR (UROXATRAL) 10 mg SR tablet Take 10 mg by mouth daily. Indications: Urolithiasis 11/22/17   Historical Provider   atorvastatin (LIPITOR) 20 mg tablet Take 20 mg by mouth daily. Historical Provider   bethanechol (URECHOLINE) 25 mg tablet Take 25 mg by mouth three (3) times daily. 11/21/17   Historical Provider   bisacodyl (DULCOLAX, BISACODYL,) 5 mg EC tablet Take 10 mg by mouth daily as needed for Constipation. Indications: constipation    Historical Provider   doxycycline (VIBRAMYCIN) 100 mg capsule 1 Cap two (2) times a day. 1/13/18   Historical Provider   DOCUSATE SODIUM (DSS PO) Take 2 Caps by mouth two (2) times daily as needed. Historical Provider   enoxaparin (LOVENOX) 40 mg/0.4 mL 40 mg by SubCUTAneous route daily. 11/22/17   Historical Provider   insulin lispro (HUMALOG) 100 unit/mL injection 0-5 Units by SubCUTAneous route four (4) times daily. Indications: type 2 diabetes mellitus    Historical Provider   lisinopril (PRINIVIL, ZESTRIL) 5 mg tablet Take 5 mg by mouth daily. Historical Provider   traMADol (ULTRAM) 50 mg tablet Take 50 mg by mouth every six (6) hours as needed for Pain. Indications: NEUROPATHIC PAIN    Historical Provider   aspirin delayed-release 81 mg tablet Take 81 mg by mouth daily.     Historical Provider   oxyCODONE IR (ROXICODONE) 5 mg immediate release tablet Take 1-2 Tabs by mouth every four (4) hours as needed for Pain. Max Daily Amount: 60 mg. 5/11/17   Justin Roque PA-C   trandolapril (MAVIK) 2 mg tablet Take 2 mg by mouth daily. Indications: hypertension    Historical Provider   ezetimibe (ZETIA) 10 mg tablet Take  by mouth nightly. Historical Provider   rosuvastatin (CRESTOR) 10 mg tablet Take 10 mg by mouth daily. Indications: hyperlipidemia    Historical Provider   pregabalin (LYRICA) 150 mg capsule Take 150 mg by mouth daily. Indications: Diabetic Peripheral Neuropathy    Historical Provider   potassium chloride SR (KLOR-CON 8) 8 mEq tablet Take 8 mEq by mouth daily. Historical Provider   amLODIPine (NORVASC) 5 mg tablet Take 5 mg by mouth daily. Indications: hypertension    Historical Provider   furosemide (LASIX) 20 mg tablet Take 20 mg by mouth daily. Indications: Edema, h/o cellulitis    Historical Provider       Allergies   Allergen Reactions    Oxycodone Other (comments)     :makes him crazy per wife       Review of Systems  Gen: No fever, chills, malaise, weight loss/gain. Heent: No headache, rhinorrhea, epistaxis, ear pain, hearing loss, sinus pain, neck pain/stiffness, sore throat. Heart: No chest pain, palpitations, VIRAMONTES, pnd, or orthopnea. Resp: No cough, hemoptysis, wheezing and shortness of breath. GI: No nausea, vomiting, diarrhea, constipation, melena or hematochezia. : No urinary obstruction, dysuria or hematuria. Derm: No rash, new skin lesion or pruritis. Musc/skeletal: no bone or joint complains. Vasc: No edema, cyanosis or claudication. Endo: No heat/cold intolerance, no polyuria,polydipsia or polyphagia. Neuro: No unilateral weakness, numbness, tingling. No seizures. Heme: No easy bruising or bleeding.           Physical Exam:     Physical Exam:  Visit Vitals    /58 (BP 1 Location: Right arm, BP Patient Position: At rest;Supine)    Pulse 97    Temp 99.3 °F (37.4 °C)    Resp 20    Ht 5' 5\" (1.651 m)    Wt 88.9 kg (196 lb)    SpO2 100%    BMI 32.62 kg/m2      O2 Device: Room air    Temp (24hrs), Av.2 °F (38.4 °C), Min:99.3 °F (37.4 °C), Max:103 °F (39.4 °C)             General:  Awake, cooperative, no distress. Head:  Normocephalic, without obvious abnormality, atraumatic. Eyes:  Conjunctivae/corneas clear, sclera anicteric, PERRL, EOMs intact. Nose: Nares normal. No drainage or sinus tenderness. Throat: Lips, mucosa, and tongue normal.    Neck: Supple, symmetrical, trachea midline, no adenopathy. Lungs:   Clear to auscultation bilaterally. Heart:  Regular rate and rhythm, S1, S2 normal, no murmur, click, rub or gallop. Abdomen: Soft, non-tender. Bowel sounds normal. No masses,  No organomegaly. Extremities: Right knee red and warm. Pulses: 2+ and symmetric all extremities. Skin: Skin color pink/pale/mottled/flushed, turgor normal. No rashes or lesions   Neurologic: CNII-XII intact. No focal motor or sensory deficit. Labs Reviewed: All lab results for the last 24 hours reviewed.   Recent Results (from the past 24 hour(s))   GLUCOSE, POC    Collection Time: 18  3:32 PM   Result Value Ref Range    Glucose (POC) 150 (H) 70 - 110 mg/dL   SED RATE (ESR)    Collection Time: 18  3:41 PM   Result Value Ref Range    Sed rate, automated 96 (H) 0 - 20 mm/hr   CBC W/O DIFF    Collection Time: 18  3:49 PM   Result Value Ref Range    WBC 5.1 4.6 - 13.2 K/uL    RBC 4.11 (L) 4.70 - 5.50 M/uL    HGB 11.4 (L) 13.0 - 16.0 g/dL    HCT 35.3 (L) 36.0 - 48.0 %    MCV 85.9 74.0 - 97.0 FL    MCH 27.7 24.0 - 34.0 PG    MCHC 32.3 31.0 - 37.0 g/dL    RDW 18.7 (H) 11.6 - 14.5 %    PLATELET 819 136 - 926 K/uL    MPV 9.7 9.2 - 56.7 FL   METABOLIC PANEL, COMPREHENSIVE    Collection Time: 18  3:49 PM   Result Value Ref Range    Sodium 139 136 - 145 mmol/L    Potassium 4.2 3.5 - 5.5 mmol/L    Chloride 99 (L) 100 - 108 mmol/L CO2 32 21 - 32 mmol/L    Anion gap 8 3.0 - 18 mmol/L    Glucose 142 (H) 74 - 99 mg/dL    BUN 17 7.0 - 18 MG/DL    Creatinine 1.07 0.6 - 1.3 MG/DL    BUN/Creatinine ratio 16 12 - 20      GFR est AA >60 >60 ml/min/1.73m2    GFR est non-AA >60 >60 ml/min/1.73m2    Calcium 9.0 8.5 - 10.1 MG/DL    Bilirubin, total 0.6 0.2 - 1.0 MG/DL    ALT (SGPT) 44 16 - 61 U/L    AST (SGOT) 35 15 - 37 U/L    Alk. phosphatase 92 45 - 117 U/L    Protein, total 7.3 6.4 - 8.2 g/dL    Albumin 2.5 (L) 3.4 - 5.0 g/dL    Globulin 4.8 (H) 2.0 - 4.0 g/dL    A-G Ratio 0.5 (L) 0.8 - 1.7     HEMOGLOBIN A1C WITH EAG    Collection Time: 01/31/18  3:49 PM   Result Value Ref Range    Hemoglobin A1c 6.8 (H) 4.5 - 5.6 %    Est. average glucose 148 mg/dL   PROTHROMBIN TIME + INR    Collection Time: 01/31/18  3:49 PM   Result Value Ref Range    Prothrombin time 13.8 11.5 - 15.2 sec    INR 1.1 0.8 - 1.2     PTT    Collection Time: 01/31/18  3:49 PM   Result Value Ref Range    aPTT 34.4 23.0 - 36.4 SEC   SED RATE (ESR)    Collection Time: 01/31/18  3:49 PM   Result Value Ref Range    Sed rate, automated 85 (H) 0 - 20 mm/hr   CBC WITH AUTOMATED DIFF    Collection Time: 01/31/18  4:16 PM   Result Value Ref Range    WBC 5.2 4.6 - 13.2 K/uL    RBC 4.09 (L) 4.70 - 5.50 M/uL    HGB 11.1 (L) 13.0 - 16.0 g/dL    HCT 34.9 (L) 36.0 - 48.0 %    MCV 85.3 74.0 - 97.0 FL    MCH 27.1 24.0 - 34.0 PG    MCHC 31.8 31.0 - 37.0 g/dL    RDW 18.8 (H) 11.6 - 14.5 %    PLATELET 833 662 - 548 K/uL    MPV 9.9 9.2 - 11.8 FL    NEUTROPHILS 80 (H) 40 - 73 %    LYMPHOCYTES 9 (L) 21 - 52 %    MONOCYTES 10 3 - 10 %    EOSINOPHILS 1 0 - 5 %    BASOPHILS 0 0 - 2 %    ABS. NEUTROPHILS 4.1 1.8 - 8.0 K/UL    ABS. LYMPHOCYTES 0.5 (L) 0.9 - 3.6 K/UL    ABS. MONOCYTES 0.5 0.05 - 1.2 K/UL    ABS. EOSINOPHILS 0.0 0.0 - 0.4 K/UL    ABS.  BASOPHILS 0.0 0.0 - 0.06 K/UL    DF AUTOMATED     PROTHROMBIN TIME + INR    Collection Time: 01/31/18  4:16 PM   Result Value Ref Range    Prothrombin time 14.4 11.5 - 15.2 sec    INR 1.2 0.8 - 1.2     METABOLIC PANEL, BASIC    Collection Time: 01/31/18  4:16 PM   Result Value Ref Range    Sodium 137 136 - 145 mmol/L    Potassium 4.4 3.5 - 5.5 mmol/L    Chloride 99 (L) 100 - 108 mmol/L    CO2 29 21 - 32 mmol/L    Anion gap 9 3.0 - 18 mmol/L    Glucose 153 (H) 74 - 99 mg/dL    BUN 16 7.0 - 18 MG/DL    Creatinine 1.10 0.6 - 1.3 MG/DL    BUN/Creatinine ratio 15 12 - 20      GFR est AA >60 >60 ml/min/1.73m2    GFR est non-AA >60 >60 ml/min/1.73m2    Calcium 9.1 8.5 - 10.1 MG/DL   MAGNESIUM    Collection Time: 01/31/18  4:16 PM   Result Value Ref Range    Magnesium 1.5 (L) 1.6 - 2.6 mg/dL   HEPATIC FUNCTION PANEL    Collection Time: 01/31/18  4:16 PM   Result Value Ref Range    Protein, total 7.7 6.4 - 8.2 g/dL    Albumin 2.4 (L) 3.4 - 5.0 g/dL    Globulin 5.3 (H) 2.0 - 4.0 g/dL    A-G Ratio 0.5 (L) 0.8 - 1.7      Bilirubin, total 0.6 0.2 - 1.0 MG/DL    Bilirubin, direct 0.2 0.0 - 0.2 MG/DL    Alk.  phosphatase 85 45 - 117 U/L    AST (SGOT) 37 15 - 37 U/L    ALT (SGPT) 39 16 - 61 U/L   LACTIC ACID    Collection Time: 01/31/18  4:16 PM   Result Value Ref Range    Lactic acid 1.6 0.4 - 2.0 MMOL/L   EKG, 12 LEAD, INITIAL    Collection Time: 01/31/18  5:00 PM   Result Value Ref Range    Ventricular Rate 102 BPM    Atrial Rate 102 BPM    P-R Interval 182 ms    QRS Duration 78 ms    Q-T Interval 332 ms    QTC Calculation (Bezet) 432 ms    Calculated P Axis 15 degrees    Calculated R Axis 22 degrees    Calculated T Axis 66 degrees    Diagnosis       Sinus tachycardia  Nonspecific T wave abnormality  Abnormal ECG  When compared with ECG of 09-MAY-2017 13:39,  Nonspecific T wave abnormality now evident in Inferior leads     URINALYSIS W/ RFLX MICROSCOPIC    Collection Time: 01/31/18  5:05 PM   Result Value Ref Range    Color YELLOW      Appearance CLEAR      Specific gravity 1.018 1.005 - 1.030      pH (UA) 6.5 5.0 - 8.0      Protein 30 (A) NEG mg/dL    Glucose NEGATIVE  NEG mg/dL Ketone NEGATIVE  NEG mg/dL    Bilirubin NEGATIVE  NEG      Blood NEGATIVE  NEG      Urobilinogen 1.0 0.2 - 1.0 EU/dL    Nitrites NEGATIVE  NEG      Leukocyte Esterase NEGATIVE  NEG     URINE MICROSCOPIC ONLY    Collection Time: 01/31/18  5:05 PM   Result Value Ref Range    WBC NEGATIVE  0 - 5 /hpf    RBC NEGATIVE  0 - 5 /hpf    Epithelial cells FEW 0 - 5 /lpf    Bacteria FEW (A) NEG /hpf           CC: Carlos Monge MD

## 2018-02-01 NOTE — ED NOTES
TRANSFER - OUT REPORT:    Bedside report given to Santa Sheikh RN on Brittany Zaragoza  being transferred to Telemetry for routine progression of care       Report consisted of patients Situation, Background, Assessment and   Recommendations(SBAR). Information from the following report(s) SBAR, ED Summary, MAR, Recent Results and Cardiac Rhythm NSR was reviewed with the receiving nurse. Lines:   Peripheral IV 01/31/18 Left Antecubital (Active)   Site Assessment Clean, dry, & intact 1/31/2018  4:17 PM   Phlebitis Assessment 0 1/31/2018  4:17 PM   Infiltration Assessment 0 1/31/2018  4:17 PM   Dressing Status Clean, dry, & intact 1/31/2018  4:17 PM   Dressing Type Tape;Transparent 1/31/2018  4:17 PM   Hub Color/Line Status Pink;Capped;Flushed;Patent 1/31/2018  4:17 PM   Action Taken Blood drawn 1/31/2018  4:17 PM   Alcohol Cap Used Yes 1/31/2018  4:17 PM        Opportunity for questions and clarification was provided.       Patient transported with:   Monitor  Registered Nurse     Last Filed Values:  Temp: 99.3 °F (37.4 °C) (01/31/18 1857)  Pulse (Heart Rate): 97 (01/31/18 1857)  Resp Rate: 20 (01/31/18 1857)  O2 Sat (%): 100 % (01/31/18 1857)  BP: 154/58 (01/31/18 1857)  MAP (Monitor): 82 (01/31/18 1856)  MAP (Calculated): 90 (01/31/18 1857)  Level of Consciousness: Alert (01/31/18 1857)      WBC   Date Value Ref Range Status   01/31/2018 5.2 4.6 - 13.2 K/uL Final   01/31/2018 5.1 4.6 - 13.2 K/uL Final   11/16/2017 7.4 4.6 - 13.2 K/uL Final       Blood Cultures Drawn:  yes    Initial Lactic Acid (LA):  Time 1616,  Result 1.6    Repeat LA:  Time Due N/A, Done & Result N/A    Fluid Resuscitation:  Total needed 2667, Status infusing    All Antibiotics Started:  yes, Dose Due infusing    VS x 2 post-fluid resuscitation:   no    Vasopressor Infusion:  no N/A     Provider Reassessment needed and notified:  no , Due N/A    Additional Interventions/Comments:  Pt transferred to [facility]

## 2018-02-01 NOTE — PROGRESS NOTES
D/c plan: TBD  Met with patient and his wife at bedside. Wife informed cm that that patient is here due to a septic knee. States patient  Has been at [facility], then on to [place] [id number] since 12/21/2018 for rehab. Patient now has a infected knee and the plan is for pt. To have hardware removed tomorrow. She did inform cm that she would like him to go to rehab. Cm would also recommend this patient for [facility] if patient needs long term antibiotics, rehab and or wound care. cm will continue to follow  Care Management Interventions  Transition of Care Consult (CM Consult): Discharge Planning  Health Maintenance Reviewed:  Yes

## 2018-02-01 NOTE — PROGRESS NOTES
Occupational Therapy Screening:  Services are indicated. Evaluate and Treat Order is requested. An InBasket screening referral was triggered for occupational therapy based on results obtained during the nursing admission assessment. The patients chart was reviewed and the patient is appropriate for a skilled therapy evaluation. Patient admitted d/t syncopal episode believed to be d/t sepsis from infected TKA. Please order a consult for occupational therapy if you are in agreement and would like an evaluation to be completed. Thank you.   Jolene Panda, OTR/L

## 2018-02-01 NOTE — CONSULTS
ID Consult Note    Elsi Fritz is a 80 y.o. male who is being seen on consult for antibiotic management for  R knee arthroplasty infection. The requesting  physician is Ale Rosas MD.      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 on 2/2/18   S/p knee aspiration 1/23/18 as out patient , fluid cx shows NG   Suspected Sepsis due to above  -fever and AMS on admisison   Recent R knee cellulitis 3 weeks ago treated with po abx for 10 days       Plan:   Recommend to check for influenza testing since he has dry cough and fever and wheezng   Continue vancomycin and cefepime for now  Patient has been scheduled for explanatation and placement of spacer placement on 2/2/18  Previous knee aspiration fluid has been negative   Please send for routine, anaerobic cultures  And fungal cultures  From procedure   His culture could be negative due to preoperative antibiotics    Follow up cultures and streamline antibiotics accordingly   Recommend to place picc line if admisison blood cultures negative for 72 hrs   Plan to treat with total 6 weeks with iv antibiotics   Will need out patient ID follow up arrangement on discharge    Continue supportive care  We are watching pt closely for toxicities and side effects to abx and drug-drug interactions. We will adjust antibiotics upon the results of the pending tests and the clinical improvement of the patient. Case discussed with: [X]Patient [X]Family [X]Nursing [ ]Case Management  [ ] Trace Ventura ]MD/Care team     I discussed with Elsi Fritz about the management, prognosis, treatment and complications of the above infectious disease conditions. All patient's questions were answered in detail. He voiced understanding of the discussed issues. He agrees with current plan of therapy.        History of Present Illness       Elsi Fritz is a 80 y.o. male who has been admitted to [facility] on 1/31/18 for syncopal episode and fever. He had R knee replacement done about 5 years ago. In may 2017, he had knee surgery done for qudricep muscle repair and since then he is having knee swelling and pain. In November 2017 , he had underwent for spin surgery and since then he has been at rehab . About 3 weeks ago, he was diagnosed with R knee cellultiis and treated with po abx for 10 days at rehab. He has seen orthopedic surgeon as out patient and knee aspiration done on 1/23 as out patient and cultures   Shows no growth. He is suspected to have R knee infection and scheduled to have exaplantation of TKA and spacer placement by dr Valarie Wu next week. He came to hospital for pre op lab draw on 1/31 and found to have AMS with fever and admitted for sepsis . On exam in the ED patient was febrile 103F  , tachycardic, HR in 110  Hypertensive 165/80 ,  SpO2 100% on RA  I reviewed lab tests- Labs on presentation showed WBC 5.1, Cr1.0, ESR 96 , CRP 3.8 .    Urinalysis   Shows no evidence of UTI .  Blood cx  Shows NGTD . Urine cx shows NG. I independently reviewed images- official report showed:  CXR showed no infiltrates  Patient has been admitted and started on iv vancomycin and cefepime. Blood and urine cx has been sent . The history is provided by the patient and patient's family  And reviewing medical records and discussion with referring Clarke Matters and staff involving in patient care . No  was used. We were asked to comment on differential diagnosis and antibiotic management. I obtained the history of this case by independently reviewing the previous available medical records, labs, microbiology data, imaging studies, as well as from the conversation I had with the medical and nursing staff involved in this patient's care. Cc: This note has been sent to the requesting physican, with findings and recommendations via [email or web address].          Chief complaint:   Chief Complaint Patient presents with    Altered mental status             Review of Systems:   Interval notes, available laboratory, microbiology and radiographic data reviewed. Discussed with nursing  where appropriate. Review of Systems - General ROS: positive for  - fever and malaise  negative for - night sweats  ENT ROS: negative for - epistaxis, headaches or nasal congestion  Hematological and Lymphatic ROS: negative for - bleeding problems, blood clots, bruising, fatigue or jaundice  Endocrine ROS: negative for - hot flashes, malaise/lethargy or polydipsia/polyuria  Respiratory ROS: positive for - cough  negative for - hemoptysis, orthopnea, pleuritic pain, shortness of breath or sputum changes  Cardiovascular ROS: negative for - chest pain, edema, orthopnea or palpitations  Gastrointestinal ROS: no abdominal pain, change in bowel habits, or black or bloody stools  negative for - diarrhea, heartburn or nausea/vomiting  Genito-Urinary ROS: positive for - urgency  negative for - dysuria, genital ulcers, hematuria or incontinence  Musculoskeletal ROS: positive for - joint pain and swelling in knee - right  Neurological ROS: negative for - confusion, dizziness, headaches, seizures or weakness  Dermatological ROS: negative  negative for - pruritus or rash      Physical Assessment:     VITAL SIGNS  Blood pressure 174/62, pulse 93, temperature (!) 101.9 °F (38.8 °C), resp. rate 19, height 5' 5\" (1.651 m), weight 93.1 kg (205 lb 4.8 oz), SpO2 96 %.   Temp (24hrs), Av.6 °F (38.1 °C), Min:99.3 °F (37.4 °C), Max:103 °F (39.4 °C)        CONSTITUTIONAL:    Awake, well developed, nourished, no distress  PSYCH:   Judgement/insight - normal   Alert and oriented x 3   Affect - normal, not anxious, agitated or depressed  EYES:    Conjunctivae normal   PERRL  ENMT:    Left exterior ear normal, right external ear normal, nose normal   Inspection lips, gums - normal   Oropharynx clear and moist  NECK:    Exam neck - no masses, normal symmetry, trachea midline   Exam thyroid - no thyromegaly, no masses  PULMONARY/CHEST WALL:   Resp Effort - No use of accessory muscles, no intercostal retractions   Few diffuse rhonchi BL lungs   CARDIOVASCULAR:    Heart sounds normal, normal rate and regular rhythm   Intact pedal pulses  ABDOMINAL:    Appearance normal, soft, bowel sounds normal, no tenderness   Liver/spleen - could not palpate organomegaly  GENITOURINARY: External genitalia - not examined  LYMPHATIC SYSTEM:   No lymphadenopathy in neck   No lymphadenopathy in groin  MUSCULOSKELETAL:   Gait not examined   Digits, nails - no clubbing, no cyanosis, no infection   RUE, LUE - normal ROM   RLE, LLE -  R knee with well healed scar, minimal warmth , no erythema 1 plus edema noted    Muscle strength normal    Head and neck ROM - normal  SKIN    Inspection - as above - otherwise no rash, no ulcers, no crepitus, intact   Palpation - as above - otherwise no induration, no nodules, dry, warm  NEUROLOGICAL: EOM normal     Sensation by touch was normal              MICROBIOLOGY DATA  Recent Labs      01/31/18   1705  01/31/18   1616  01/31/18   1549   CULT  NO GROWTH AFTER 13 HOURS  NO GROWTH AFTER 13 HOURS  MRSA target DNA is not detected (presumptive not colonized with MRSA)       Recent Labs      01/31/18   1705  01/31/18   1616  01/31/18   1549   CULT  NO GROWTH AFTER 13 HOURS  NO GROWTH AFTER 13 HOURS  MRSA target DNA is not detected (presumptive not colonized with MRSA)     LAB DATA    Recent Results (from the past 24 hour(s))   GLUCOSE, POC    Collection Time: 01/31/18  3:32 PM   Result Value Ref Range    Glucose (POC) 150 (H) 70 - 110 mg/dL   SED RATE (ESR)    Collection Time: 01/31/18  3:41 PM   Result Value Ref Range    Sed rate, automated 96 (H) 0 - 20 mm/hr   C REACTIVE PROTEIN, QT    Collection Time: 01/31/18  3:41 PM   Result Value Ref Range    C-Reactive protein 3.8 (H) 0 - 0.3 mg/dL   CBC W/O DIFF    Collection Time: 01/31/18  3:49 PM Result Value Ref Range    WBC 5.1 4.6 - 13.2 K/uL    RBC 4.11 (L) 4.70 - 5.50 M/uL    HGB 11.4 (L) 13.0 - 16.0 g/dL    HCT 35.3 (L) 36.0 - 48.0 %    MCV 85.9 74.0 - 97.0 FL    MCH 27.7 24.0 - 34.0 PG    MCHC 32.3 31.0 - 37.0 g/dL    RDW 18.7 (H) 11.6 - 14.5 %    PLATELET 176 129 - 684 K/uL    MPV 9.7 9.2 - 12.4 FL   METABOLIC PANEL, COMPREHENSIVE    Collection Time: 01/31/18  3:49 PM   Result Value Ref Range    Sodium 139 136 - 145 mmol/L    Potassium 4.2 3.5 - 5.5 mmol/L    Chloride 99 (L) 100 - 108 mmol/L    CO2 32 21 - 32 mmol/L    Anion gap 8 3.0 - 18 mmol/L    Glucose 142 (H) 74 - 99 mg/dL    BUN 17 7.0 - 18 MG/DL    Creatinine 1.07 0.6 - 1.3 MG/DL    BUN/Creatinine ratio 16 12 - 20      GFR est AA >60 >60 ml/min/1.73m2    GFR est non-AA >60 >60 ml/min/1.73m2    Calcium 9.0 8.5 - 10.1 MG/DL    Bilirubin, total 0.6 0.2 - 1.0 MG/DL    ALT (SGPT) 44 16 - 61 U/L    AST (SGOT) 35 15 - 37 U/L    Alk.  phosphatase 92 45 - 117 U/L    Protein, total 7.3 6.4 - 8.2 g/dL    Albumin 2.5 (L) 3.4 - 5.0 g/dL    Globulin 4.8 (H) 2.0 - 4.0 g/dL    A-G Ratio 0.5 (L) 0.8 - 1.7     HEMOGLOBIN A1C WITH EAG    Collection Time: 01/31/18  3:49 PM   Result Value Ref Range    Hemoglobin A1c 6.8 (H) 4.5 - 5.6 %    Est. average glucose 148 mg/dL   MRSA SCREEN - PCR (NASAL)    Collection Time: 01/31/18  3:49 PM   Result Value Ref Range    Special Requests: NO SPECIAL REQUESTS      Culture result:        MRSA target DNA is not detected (presumptive not colonized with MRSA)   PROTHROMBIN TIME + INR    Collection Time: 01/31/18  3:49 PM   Result Value Ref Range    Prothrombin time 13.8 11.5 - 15.2 sec    INR 1.1 0.8 - 1.2     PTT    Collection Time: 01/31/18  3:49 PM   Result Value Ref Range    aPTT 34.4 23.0 - 36.4 SEC   SED RATE (ESR)    Collection Time: 01/31/18  3:49 PM   Result Value Ref Range    Sed rate, automated 85 (H) 0 - 20 mm/hr   CBC WITH AUTOMATED DIFF    Collection Time: 01/31/18  4:16 PM   Result Value Ref Range    WBC 5.2 4.6 - 13.2 K/uL    RBC 4.09 (L) 4.70 - 5.50 M/uL    HGB 11.1 (L) 13.0 - 16.0 g/dL    HCT 34.9 (L) 36.0 - 48.0 %    MCV 85.3 74.0 - 97.0 FL    MCH 27.1 24.0 - 34.0 PG    MCHC 31.8 31.0 - 37.0 g/dL    RDW 18.8 (H) 11.6 - 14.5 %    PLATELET 478 150 - 963 K/uL    MPV 9.9 9.2 - 11.8 FL    NEUTROPHILS 80 (H) 40 - 73 %    LYMPHOCYTES 9 (L) 21 - 52 %    MONOCYTES 10 3 - 10 %    EOSINOPHILS 1 0 - 5 %    BASOPHILS 0 0 - 2 %    ABS. NEUTROPHILS 4.1 1.8 - 8.0 K/UL    ABS. LYMPHOCYTES 0.5 (L) 0.9 - 3.6 K/UL    ABS. MONOCYTES 0.5 0.05 - 1.2 K/UL    ABS. EOSINOPHILS 0.0 0.0 - 0.4 K/UL    ABS. BASOPHILS 0.0 0.0 - 0.06 K/UL    DF AUTOMATED     PROTHROMBIN TIME + INR    Collection Time: 01/31/18  4:16 PM   Result Value Ref Range    Prothrombin time 14.4 11.5 - 15.2 sec    INR 1.2 0.8 - 1.2     METABOLIC PANEL, BASIC    Collection Time: 01/31/18  4:16 PM   Result Value Ref Range    Sodium 137 136 - 145 mmol/L    Potassium 4.4 3.5 - 5.5 mmol/L    Chloride 99 (L) 100 - 108 mmol/L    CO2 29 21 - 32 mmol/L    Anion gap 9 3.0 - 18 mmol/L    Glucose 153 (H) 74 - 99 mg/dL    BUN 16 7.0 - 18 MG/DL    Creatinine 1.10 0.6 - 1.3 MG/DL    BUN/Creatinine ratio 15 12 - 20      GFR est AA >60 >60 ml/min/1.73m2    GFR est non-AA >60 >60 ml/min/1.73m2    Calcium 9.1 8.5 - 10.1 MG/DL   MAGNESIUM    Collection Time: 01/31/18  4:16 PM   Result Value Ref Range    Magnesium 1.5 (L) 1.6 - 2.6 mg/dL   HEPATIC FUNCTION PANEL    Collection Time: 01/31/18  4:16 PM   Result Value Ref Range    Protein, total 7.7 6.4 - 8.2 g/dL    Albumin 2.4 (L) 3.4 - 5.0 g/dL    Globulin 5.3 (H) 2.0 - 4.0 g/dL    A-G Ratio 0.5 (L) 0.8 - 1.7      Bilirubin, total 0.6 0.2 - 1.0 MG/DL    Bilirubin, direct 0.2 0.0 - 0.2 MG/DL    Alk.  phosphatase 85 45 - 117 U/L    AST (SGOT) 37 15 - 37 U/L    ALT (SGPT) 39 16 - 61 U/L   CULTURE, BLOOD    Collection Time: 01/31/18  4:16 PM   Result Value Ref Range    Special Requests: NO SPECIAL REQUESTS      Culture result: NO GROWTH AFTER 13 HOURS LACTIC ACID    Collection Time: 01/31/18  4:16 PM   Result Value Ref Range    Lactic acid 1.6 0.4 - 2.0 MMOL/L   EKG, 12 LEAD, INITIAL    Collection Time: 01/31/18  5:00 PM   Result Value Ref Range    Ventricular Rate 102 BPM    Atrial Rate 102 BPM    P-R Interval 182 ms    QRS Duration 78 ms    Q-T Interval 332 ms    QTC Calculation (Bezet) 432 ms    Calculated P Axis 15 degrees    Calculated R Axis 22 degrees    Calculated T Axis 66 degrees    Diagnosis       Sinus tachycardia  Nonspecific T wave abnormality  Abnormal ECG  When compared with ECG of 09-MAY-2017 13:39,  Nonspecific T wave abnormality now evident in Inferior leads     URINALYSIS W/ RFLX MICROSCOPIC    Collection Time: 01/31/18  5:05 PM   Result Value Ref Range    Color YELLOW      Appearance CLEAR      Specific gravity 1.018 1.005 - 1.030      pH (UA) 6.5 5.0 - 8.0      Protein 30 (A) NEG mg/dL    Glucose NEGATIVE  NEG mg/dL    Ketone NEGATIVE  NEG mg/dL    Bilirubin NEGATIVE  NEG      Blood NEGATIVE  NEG      Urobilinogen 1.0 0.2 - 1.0 EU/dL    Nitrites NEGATIVE  NEG      Leukocyte Esterase NEGATIVE  NEG     URINE MICROSCOPIC ONLY    Collection Time: 01/31/18  5:05 PM   Result Value Ref Range    WBC NEGATIVE  0 - 5 /hpf    RBC NEGATIVE  0 - 5 /hpf    Epithelial cells FEW 0 - 5 /lpf    Bacteria FEW (A) NEG /hpf   METABOLIC PANEL, BASIC    Collection Time: 02/01/18  5:30 AM   Result Value Ref Range    Sodium 141 136 - 145 mmol/L    Potassium 3.8 3.5 - 5.5 mmol/L    Chloride 105 100 - 108 mmol/L    CO2 24 21 - 32 mmol/L    Anion gap 12 3.0 - 18 mmol/L    Glucose 130 (H) 74 - 99 mg/dL    BUN 16 7.0 - 18 MG/DL    Creatinine 1.00 0.6 - 1.3 MG/DL    BUN/Creatinine ratio 16 12 - 20      GFR est AA >60 >60 ml/min/1.73m2    GFR est non-AA >60 >60 ml/min/1.73m2    Calcium 8.4 (L) 8.5 - 10.1 MG/DL   CBC W/O DIFF    Collection Time: 02/01/18  5:30 AM   Result Value Ref Range    WBC 3.8 (L) 4.6 - 13.2 K/uL    RBC 3.56 (L) 4.70 - 5.50 M/uL    HGB 9.6 (L) 13.0 - 16.0 g/dL    HCT 30.0 (L) 36.0 - 48.0 %    MCV 84.3 74.0 - 97.0 FL    MCH 27.0 24.0 - 34.0 PG    MCHC 32.0 31.0 - 37.0 g/dL    RDW 18.9 (H) 11.6 - 14.5 %    PLATELET 628 538 - 059 K/uL    MPV 9.5 9.2 - 11.8 FL           IMAGING     I reviewed images CXR from 1/31/18 - official report showed Mildly underexpanded lungs without superimposed acute radiographic abnormality        Current medications reviewed in EPIC    Scheduled medication     Current Facility-Administered Medications   Medication Dose Route Frequency    sodium chloride (NS) flush 5-10 mL  5-10 mL IntraVENous PRN    cefepime (MAXIPIME) 2 g in sterile water (preservative free) 10 mL IV syringe  2 g IntraVENous Q8H    vancomycin (VANCOCIN) 750 mg in D5W 150 mL infusion  750 mg IntraVENous Q12H    alfuzosin SR (UROXATRAL) tablet 10 mg  10 mg Oral DAILY    atorvastatin (LIPITOR) tablet 20 mg  20 mg Oral DAILY    bethanechol (URECHOLINE) tablet 25 mg  25 mg Oral TID    enoxaparin (LOVENOX) injection 40 mg  40 mg SubCUTAneous DAILY    lisinopril (PRINIVIL, ZESTRIL) tablet 5 mg  5 mg Oral DAILY    0.9% sodium chloride infusion  125 mL/hr IntraVENous CONTINUOUS         Allergies   Allergen Reactions    Oxycodone Other (comments)     :makes him crazy per wife       Past Medical History:   Diagnosis Date    Arthritis     Coagulation disorder ([address]     Lovenox    Diabetes ([facility] [address] 2005    diet controlled, on insulin at this time (1/31/18)    Hypercholesteremia     Hypertension 2005    Sleep apnea 2008    no treatment    Urinary urgency      Past Surgical History:   Procedure Laterality Date    HX APPENDECTOMY      HX BACK SURGERY  11/2017    X 3-last surgery 11/2017    HX BACK SURGERY  1990's    cervcal neck    HX CATARACT REMOVAL      bilateral    HX ORTHOPAEDIC  2013    right knee replacement    HX ORTHOPAEDIC  05/2017    ligament repair    HX VASECTOMY       Social History     Social History    Marital status:      Spouse name: N/A  Number of children: N/A    Years of education: N/A     Occupational History    Not on file. Social History Main Topics    Smoking status: Never Smoker    Smokeless tobacco: Never Used    Alcohol use No      Comment:      Drug use: No    Sexual activity: Not Currently     Other Topics Concern    Not on file     Social History Narrative     The family history was reviewed and other than as discussed above in the H or PI does not substantially contribute to the issues surrounding the present illness. History reviewed. No pertinent family history. Patient Active Problem List   Diagnosis Code    Diabetes mellitus type 2, controlled ([facility] [address] E11.9    Hyperlipidemia E78.5    HTN (hypertension) I10    Rupture of right quadriceps tendon S76.111A    Sepsis ([facility] [address] A41.9    Infected prosthetic knee joint ([facility] [address] T84.59XA, Z96.659    Syncope R55         Medical Decision Making:     I reviewed and summarized data from old medical records and obtained history from other sources than patient. I reviewed laboratory tests, Radiology data, and diagnostic tests in the CPT medicine section. I discussed with the physicians and the nursed involved in this patient's care about this patient's current medical problems. I have  Discussed plan of care with patient and nursing staff. The total visit duration was of 55  minutes of which more than 50% was spent in coordination of care and counseling (time spent with patient/family face to face, physical exam, reviewing microbiology data, laboratory results, radiographic data, speaking with physicians and nursing staff involved in this pt's care).

## 2018-02-01 NOTE — PROGRESS NOTES
2600 [facility] of patient from [facility] patient currently off the unit for echo, CTA and Carotid duplex    1130 patient returned back to unit from series of testing    1145   IDR/SLIDR Summary          Patient: Chencho Barrera MRN: [id number]    Age: 80 y.o. YOB: 1933 Room/Bed: [facility]/01   Admit Diagnosis: Sepsis ([facility] [address]  COMPLICATION FROM PROSTHETIC DEVICE RIGHT KNEE  Principal Diagnosis: Sepsis ([address]   Goals: decrease fever  Readmission: NO  Quality Measure: Not applicable  VTE Prophylaxis: Mechanical  Influenza Vaccine screening completed? YES  Pneumococcal Vaccine screening completed? YES  Mobility needs: Yes   Nutrition plan:Yes  Consults:P.T, O.T. and Case Management    Financial concerns:No  Escalated to CM? YES  RRAT Score:    Interventions:H2H  Testing due for pt today? YES carotid duplex, CTA and Echo  LOS: 1 days Expected length of stay 2 days  Discharge plan: home   PCP: Jeff Fitzgerald MD  Transportation needs: Yes    Days before discharge:two or more days before discharge   Discharge disposition: Home    Signed:     Feliz Bentley RN  2/1/2018  12:58 PM    1206 Assessment completed, patient is alert and oriented x's 4, no c/o pain. NSR on the monitor with a HR in the 90's. Lung fields are clear throughout on RA, 02 sat sustained in the mid 90's with a nonproductive cough noted. Patient is tolerating a cardiac diet without any N/V or gastric distention, voiding in the urinal without any complications. Scheduled medications administered as ordered without any complications. Patient is currently sitting up in bed with spouse at bedside, no s/s of any distress, VSS, call bell and personal belongings within reach, will continue to monitor    1400 NAD, will continue to monitor. 1620 Bedside and Verbal shift change report given to Michelle Shin RN (oncoming nurse) by Becca Moses RN (offgoing nurse). Report included the following information SBAR.

## 2018-02-01 NOTE — PROGRESS NOTES
Hospitalist Progress Note    Patient: Magda Weiss MRN: [id number]  CSN: [id number]    YOB: 1933  Age: 80 y.o. Sex: male    DOA: 1/31/2018 LOS:  LOS: 1 day          Chief Complaint:    Sepsis    Assessment/Plan     1. Sepsis  2. Syncope  3. Infected Prosthetic Joint  4. HTN  5. Dyslipidemia    1. Continue Cefepime and vancomycin. Consult placed to Dr Klarissa Lara, infectious diseases, and she has seen the patient. Continue IVF for rehydration. Will continue to monitor. 2. Patient underwent carotid ultrasound, which shows some left ICA stenosis ranging from 50-69%. Echocardiogram has been completed but report not available. CTA was completed as well, no signs of PE.   3. As above, Dr Klarissa Lara is on board. Called the ortho office at Dr Everett Dears request, patient had knee drained on 1/23/18, and currently no growth to date on that culture. Will plan for I&D and washout tomorrow per ortho. The patient's wife has questions regarding procedure, will defer to ortho. 4. BP Stable, continue regimen. Will monitor. 5. Continue Lipitor. DVT Prophylaxis - SCDs  Dispo: In discussion with Dr Klarissa Lara, patient will undergo procedure tomorrow, monitor blood cultures for growth, if no growth x48-72 hours, patient will have PICC placed and antibiotics managed per Dr Klarissa Lara. Anticipate the patient will remain hospitalized through the weekend. Patient Active Problem List   Diagnosis Code    Diabetes mellitus type 2, controlled ([facility] [address] E11.9    Hyperlipidemia E78.5    HTN (hypertension) I10    Rupture of right quadriceps tendon S76.111A    Sepsis ([facility] [address] A41.9    Infected prosthetic knee joint ([facility] [address] T84.59XA, Z96.659    Syncope R55       Subjective:    Patient states he is having some chills. Mild R knee pain. Wife has questions regarding procedure.      Review of systems:    Constitutional: denies fevers, chills, myalgias  Respiratory: denies SOB, cough  Cardiovascular: denies chest pain, palpitations  Gastrointestinal: denies nausea, vomiting, diarrhea      Vital signs/Intake and Output:  Visit Vitals    /59    Pulse 86    Temp 99.5 °F (37.5 °C)    Resp 18    Ht 5' 5\" (1.651 m)    Wt 93.1 kg (205 lb 4.8 oz)    SpO2 96%    BMI 34.16 kg/m2     Current Shift:     Last three shifts:  01/30 1901 - 02/01 0700  In: 1823.3 [P.O.:160; I.V.:1663.3]  Out: 200 [Urine:200]    Exam:    General: Elderly male, alert, NAD, OX3  Head/Neck: NCAT, supple, No masses, No lymphadenopathy  CVS:Regular rate and rhythm, no M/R/G, S1/S2 heard, no thrill  Lungs:Clear to auscultation bilaterally, no wheezes, rhonchi, or rales  Abdomen: Soft, Nontender, No distention, Normal Bowel sounds, No hepatomegaly  Extremities: LLE unremarkable, RLE in immobilizer, pulses palpable 2+  Skin:normal texture and turgor, no rashes, no lesions  Neuro:grossly normal , follows commands  Psych:appropriate                Labs: Results:       Chemistry Recent Labs      02/01/18   0530  01/31/18   1616  01/31/18   1549   GLU  130*  153*  142*   NA  141  137  139   K  3.8  4.4  4.2   CL  105  99*  99*   CO2  24  29  32   BUN  16  16  17   CREA  1.00  1.10  1.07   CA  8.4*  9.1  9.0   AGAP  12  9  8   BUCR  16  15  16   AP   --   85  92   TP   --   7.7  7.3   ALB   --   2.4*  2.5*   GLOB   --   5.3*  4.8*   AGRAT   --   0.5*  0.5*      CBC w/Diff Recent Labs      02/01/18   0530  01/31/18   1616  01/31/18   1549   WBC  3.8*  5.2  5.1   RBC  3.56*  4.09*  4.11*   HGB  9.6*  11.1*  11.4*   HCT  30.0*  34.9*  35.3*   PLT  174  221  222   GRANS   --   80*   --    LYMPH   --   9*   --    EOS   --   1   --       Cardiac Enzymes No results for input(s): CPK, CKND1, SREE in the last 72 hours.     No lab exists for component: CKRMB, TROIP   Coagulation Recent Labs      01/31/18   1616  01/31/18   1549   PTP  14.4  13.8   INR  1.2  1.1   APTT   --   34.4       Lipid Panel No results found for: CHOL, CHOLPOCT, CHOLX, CHLST, CHOLV, 075977, HDL, LDL, LDLC, DLDLP, 861365, VLDLC, VLDL, TGLX, TRIGL, TRIGP, TGLPOCT, CHHD, CHHDX   BNP No results for input(s): BNPP in the last 72 hours.    Liver Enzymes Recent Labs      01/31/18   1616   TP  7.7   ALB  2.4*   AP  85   SGOT  37      Thyroid Studies No results found for: T4, T3U, TSH, TSHEXT     Procedures/imaging: see electronic medical records for all procedures/Xrays and details which were not copied into this note but were reviewed prior to creation of [address], PA-C

## 2018-02-01 NOTE — PROGRESS NOTES
Chart reviewed. Pt admitted to hospital for sepsis. Pt has PMH of HTN, DM, sleep apnea. Per chart, pt has septic joint. Please consider PT/OT eval once medically appropriate to assist in discharge planning needs. CM will be available for discharge planning needs. 1140  Per NEELAM Bloom, pt will have PICC placed on Monday for IV abx. Readmission Risk Assessment:     Moderate Risk and MSSP/Good Help ACO patients    RRAT Score:  13-20    Initial Assessment:  See above     Emergency Contact:    Gatito Oh Spouse [phone]     Pertinent Medical Hx:     See above    PCP/Specialists:  PCP [employer]:       DME:          Moderate Risk Care Transition Plan:  1. Evaluate for [place] or H2H, SNF, acute rehab, community care coordination of resources. 2. Involve patient/caregiver in assessment, planning, education and implement of intervention. 3. CM daily patient care huddles/interdisciplinary rounds. 4. PCP/Specialist appointment within 5  7 days made prior to discharge. 5. Facilitate transportation and logistics for follow-up appointments. 6. Medication reconciliation [id number] [facility]  7. Formal handoff between hospital provider and post-acute provider to transition patient  Handoff to [id number] Nurse Navigator or PCP practice. Care Management Interventions  Transition of Care Consult (CM Consult): Discharge Planning  Health Maintenance Reviewed:  Yes

## 2018-02-01 NOTE — PROCEDURES
[facility]  *** FINAL REPORT ***    Name: Kojo Dowell  MRN: [id number]    Inpatient  : 27 Dec 1933  HIS Order #: [id number]  [address] [facility] Visit #: [id number]  Date: 2018    TYPE OF TEST: Cerebrovascular Duplex    REASON FOR TEST  Syncope    Right Carotid:-             Proximal               Mid                 Distal  cm/s  Systolic  Diastolic  Systolic  Diastolic  Systolic  Diastolic  CCA:     61.7       7.0       91.0      10.0       86.0      15.0  Bulb:  ECA:    116.0       6.0  ICA:    102.0      11.0       80.0      14.0       62.0      10.0  ICA/CCA:  1.1       1.1    ICA Stenosis: <50%    Right Vertebral:-  Finding: Antegrade  Sys:       41.0  Jolie:        5.0    Right Subclavian: Normal    Left Carotid:-            Proximal                Mid                 Distal  cm/s  Systolic  Diastolic  Systolic  Diastolic  Systolic  Diastolic  CCA:    432.3      12.0      103.0      13.0       96.0      15.0  Bulb:  ECA:    147.0       6.0  ICA:    156.0      12.0      109.0      14.0       91.0      16.0  ICA/CCA:  1.5       1.0    ICA Stenosis: 50-69%    Left Vertebral:-  Finding: Antegrade  Sys:       42.0  Jolie:       10.0    Left Subclavian: Normal    INTERPRETATION/FINDINGS  Duplex images were obtained using 2-D gray scale, color flow, and  spectral Doppler analysis. 1. <50% stenosis in the right internal carotid artery. 2. 50-69% stenosis in the left internal carotid artery by the PSV  criteria  3. No significant stenosis in the external carotid arteries  bilaterally. 4. Antegrade flow in both vertebral arteries. 5. Normal flow in both subclavian arteries. Plaque Morphology:  1. Homogeneous plaque in the bulb and right ICA. 2. Homogeneous plaque in the bulb and left ICA. ADDITIONAL COMMENTS    I have personally reviewed the data relevant to the interpretation of  this  study.     TECHNOLOGIST: Darnell Joaquin  Signed: 2018 11:48 AM    PHYSICIAN: Jenna Argueta MD  Signed: 02/01/2018 01:40 PM

## 2018-02-01 NOTE — WOUND CARE
Pt seen for consult for open area to sacrum. Upon assessment healed area to sacrum noted, but no actively open wounds, see image. Recommend keeping pt dry and without incontinent briefs, apply proshield after each incontinent episode. Wound care remains available as needed during this hospitalization.

## 2018-02-01 NOTE — PROGRESS NOTES
Pharmacist Renal Dosing Progress Note for Cefepime    The following medication: Cefepime was automatically dose-adjusted per THE [facility] P&T Committee Protocol, with respect to renal function. Consult provided for this   80 y.o. , male , for the indication of bone and joint infection. Dose adjusted to:  Cefepime 2 grams IV q12h    Pt Weight:   Wt Readings from Last 1 Encounters:   01/31/18 93.1 kg (205 lb 4.8 oz)     Previous Regimen   Cefepime 2 grams IV q8h   Serum Creatinine Lab Results   Component Value Date/Time    Creatinine 1.00 02/01/2018 05:30 AM       Creatinine Clearance Estimated Creatinine Clearance: 57.6 mL/min (based on Cr of 1). BUN Lab Results   Component Value Date/Time    BUN 16 02/01/2018 05:30 AM         Pharmacy to continue to monitor patient daily. Will make dosage adjustments based upon changing renal function.   Signed Collin Kelly information:   [phone]

## 2018-02-02 ENCOUNTER — APPOINTMENT (OUTPATIENT)
Dept: GENERAL RADIOLOGY | Age: 83
DRG: 463 | End: 2018-02-02
Attending: ORTHOPAEDIC SURGERY
Payer: MEDICARE

## 2018-02-02 ENCOUNTER — ANESTHESIA (OUTPATIENT)
Dept: SURGERY | Age: 83
DRG: 463 | End: 2018-02-02
Payer: MEDICARE

## 2018-02-02 PROBLEM — J11.1 INFLUENZA: Status: ACTIVE | Noted: 2018-02-02

## 2018-02-02 PROBLEM — J09.X2 INFLUENZA A (H5N1): Status: ACTIVE | Noted: 2018-02-02

## 2018-02-02 LAB
ANION GAP SERPL CALC-SCNC: 6 MMOL/L (ref 3–18)
ANION GAP SERPL CALC-SCNC: 6 MMOL/L (ref 3–18)
BACTERIA SPEC CULT: NORMAL
BASOPHILS # BLD: 0 K/UL (ref 0–0.06)
BASOPHILS NFR BLD: 0 % (ref 0–2)
BUN SERPL-MCNC: 15 MG/DL (ref 7–18)
BUN SERPL-MCNC: 17 MG/DL (ref 7–18)
BUN/CREAT SERPL: 14 (ref 12–20)
BUN/CREAT SERPL: 16 (ref 12–20)
CALCIUM SERPL-MCNC: 7.7 MG/DL (ref 8.5–10.1)
CALCIUM SERPL-MCNC: 7.8 MG/DL (ref 8.5–10.1)
CHLORIDE SERPL-SCNC: 103 MMOL/L (ref 100–108)
CHLORIDE SERPL-SCNC: 107 MMOL/L (ref 100–108)
CK MB CFR SERPL CALC: 2 % (ref 0–4)
CK MB CFR SERPL CALC: 2.1 % (ref 0–4)
CK MB SERPL-MCNC: 2.3 NG/ML (ref 5–25)
CK MB SERPL-MCNC: 2.8 NG/ML (ref 5–25)
CK SERPL-CCNC: 116 U/L (ref 39–308)
CK SERPL-CCNC: 133 U/L (ref 39–308)
CO2 SERPL-SCNC: 26 MMOL/L (ref 21–32)
CO2 SERPL-SCNC: 27 MMOL/L (ref 21–32)
CREAT SERPL-MCNC: 1.04 MG/DL (ref 0.6–1.3)
CREAT SERPL-MCNC: 1.09 MG/DL (ref 0.6–1.3)
DATE LAST DOSE: NORMAL
DIFFERENTIAL METHOD BLD: ABNORMAL
EOSINOPHIL # BLD: 0 K/UL (ref 0–0.4)
EOSINOPHIL NFR BLD: 0 % (ref 0–5)
ERYTHROCYTE [DISTWIDTH] IN BLOOD BY AUTOMATED COUNT: 19.3 % (ref 11.6–14.5)
GLUCOSE BLD STRIP.AUTO-MCNC: 111 MG/DL (ref 70–110)
GLUCOSE BLD STRIP.AUTO-MCNC: 112 MG/DL (ref 70–110)
GLUCOSE BLD STRIP.AUTO-MCNC: 112 MG/DL (ref 70–110)
GLUCOSE BLD STRIP.AUTO-MCNC: 148 MG/DL (ref 70–110)
GLUCOSE SERPL-MCNC: 106 MG/DL (ref 74–99)
GLUCOSE SERPL-MCNC: 116 MG/DL (ref 74–99)
HCT VFR BLD AUTO: 23.9 % (ref 36–48)
HCT VFR BLD AUTO: 26.1 % (ref 36–48)
HGB BLD-MCNC: 7.7 G/DL (ref 13–16)
HGB BLD-MCNC: 8.4 G/DL (ref 13–16)
LACTATE SERPL-SCNC: 2.3 MMOL/L (ref 0.4–2)
LYMPHOCYTES # BLD: 0.7 K/UL (ref 0.9–3.6)
LYMPHOCYTES NFR BLD: 28 % (ref 21–52)
MAGNESIUM SERPL-MCNC: 1.3 MG/DL (ref 1.6–2.6)
MCH RBC QN AUTO: 27.6 PG (ref 24–34)
MCHC RBC AUTO-ENTMCNC: 32.2 G/DL (ref 31–37)
MCV RBC AUTO: 85.7 FL (ref 74–97)
MONOCYTES # BLD: 0.2 K/UL (ref 0.05–1.2)
MONOCYTES NFR BLD: 10 % (ref 3–10)
NEUTS SEG # BLD: 1.5 K/UL (ref 1.8–8)
NEUTS SEG NFR BLD: 62 % (ref 40–73)
PLATELET # BLD AUTO: 107 K/UL (ref 135–420)
PMV BLD AUTO: 9.2 FL (ref 9.2–11.8)
POTASSIUM SERPL-SCNC: 3.2 MMOL/L (ref 3.5–5.5)
POTASSIUM SERPL-SCNC: 3.5 MMOL/L (ref 3.5–5.5)
RBC # BLD AUTO: 2.79 M/UL (ref 4.7–5.5)
REPORTED DOSE,DOSE: NORMAL UNITS
REPORTED DOSE/TIME,TMG: 625
SERVICE CMNT-IMP: NORMAL
SODIUM SERPL-SCNC: 136 MMOL/L (ref 136–145)
SODIUM SERPL-SCNC: 139 MMOL/L (ref 136–145)
TROPONIN I SERPL-MCNC: 0.04 NG/ML (ref 0–0.06)
TROPONIN I SERPL-MCNC: 0.05 NG/ML (ref 0–0.06)
VANCOMYCIN TROUGH SERPL-MCNC: 16.5 UG/ML (ref 10–20)
WBC # BLD AUTO: 2.4 K/UL (ref 4.6–13.2)

## 2018-02-02 PROCEDURE — 74011000250 HC RX REV CODE- 250

## 2018-02-02 PROCEDURE — 87070 CULTURE OTHR SPECIMN AEROBIC: CPT | Performed by: ORTHOPAEDIC SURGERY

## 2018-02-02 PROCEDURE — 77030011640 HC PAD GRND REM COVD -A: Performed by: ORTHOPAEDIC SURGERY

## 2018-02-02 PROCEDURE — 74011250636 HC RX REV CODE- 250/636: Performed by: HOSPITALIST

## 2018-02-02 PROCEDURE — 80202 ASSAY OF VANCOMYCIN: CPT | Performed by: EMERGENCY MEDICINE

## 2018-02-02 PROCEDURE — 36415 COLL VENOUS BLD VENIPUNCTURE: CPT | Performed by: INTERNAL MEDICINE

## 2018-02-02 PROCEDURE — 0SHC08Z INSERTION OF SPACER INTO RIGHT KNEE JOINT, OPEN APPROACH: ICD-10-PCS | Performed by: ORTHOPAEDIC SURGERY

## 2018-02-02 PROCEDURE — 82962 GLUCOSE BLOOD TEST: CPT

## 2018-02-02 PROCEDURE — 85018 HEMOGLOBIN: CPT | Performed by: INTERNAL MEDICINE

## 2018-02-02 PROCEDURE — 74011250636 HC RX REV CODE- 250/636: Performed by: SPECIALIST

## 2018-02-02 PROCEDURE — 74011250636 HC RX REV CODE- 250/636

## 2018-02-02 PROCEDURE — 77030002916 HC SUT ETHLN J&J -A: Performed by: ORTHOPAEDIC SURGERY

## 2018-02-02 PROCEDURE — 0SPC0JZ REMOVAL OF SYNTHETIC SUBSTITUTE FROM RIGHT KNEE JOINT, OPEN APPROACH: ICD-10-PCS | Performed by: ORTHOPAEDIC SURGERY

## 2018-02-02 PROCEDURE — 77030018835 HC SOL IRR LR ICUM -A: Performed by: ORTHOPAEDIC SURGERY

## 2018-02-02 PROCEDURE — 82550 ASSAY OF CK (CPK): CPT | Performed by: INTERNAL MEDICINE

## 2018-02-02 PROCEDURE — 74011250636 HC RX REV CODE- 250/636: Performed by: ORTHOPAEDIC SURGERY

## 2018-02-02 PROCEDURE — 85025 COMPLETE CBC W/AUTO DIFF WBC: CPT | Performed by: PHYSICIAN ASSISTANT

## 2018-02-02 PROCEDURE — 77030013708 HC HNDPC SUC IRR PULS STRY –B: Performed by: ORTHOPAEDIC SURGERY

## 2018-02-02 PROCEDURE — 77030002966 HC SUT PDS J&J -A: Performed by: ORTHOPAEDIC SURGERY

## 2018-02-02 PROCEDURE — 77030011628: Performed by: ORTHOPAEDIC SURGERY

## 2018-02-02 PROCEDURE — 74011250637 HC RX REV CODE- 250/637: Performed by: PHYSICIAN ASSISTANT

## 2018-02-02 PROCEDURE — 73560 X-RAY EXAM OF KNEE 1 OR 2: CPT

## 2018-02-02 PROCEDURE — 74011250637 HC RX REV CODE- 250/637: Performed by: INTERNAL MEDICINE

## 2018-02-02 PROCEDURE — 80048 BASIC METABOLIC PNL TOTAL CA: CPT | Performed by: INTERNAL MEDICINE

## 2018-02-02 PROCEDURE — 74011250636 HC RX REV CODE- 250/636: Performed by: EMERGENCY MEDICINE

## 2018-02-02 PROCEDURE — 83605 ASSAY OF LACTIC ACID: CPT | Performed by: INTERNAL MEDICINE

## 2018-02-02 PROCEDURE — 83735 ASSAY OF MAGNESIUM: CPT | Performed by: INTERNAL MEDICINE

## 2018-02-02 PROCEDURE — 76010000133 HC OR TIME 3 TO 3.5 HR: Performed by: ORTHOPAEDIC SURGERY

## 2018-02-02 PROCEDURE — 77030020813 HC INST SCULP CEM KT DISP S&N -B: Performed by: ORTHOPAEDIC SURGERY

## 2018-02-02 PROCEDURE — 77030012508 HC MSK AIRWY LMA AMBU -A: Performed by: SPECIALIST

## 2018-02-02 PROCEDURE — 77030031139 HC SUT VCRL2 J&J -A: Performed by: ORTHOPAEDIC SURGERY

## 2018-02-02 PROCEDURE — 77030013079 HC BLNKT BAIR HGGR 3M -A: Performed by: SPECIALIST

## 2018-02-02 PROCEDURE — 76210000000 HC OR PH I REC 2 TO 2.5 HR: Performed by: ORTHOPAEDIC SURGERY

## 2018-02-02 PROCEDURE — C1713 ANCHOR/SCREW BN/BN,TIS/BN: HCPCS | Performed by: ORTHOPAEDIC SURGERY

## 2018-02-02 PROCEDURE — 74011000250 HC RX REV CODE- 250: Performed by: EMERGENCY MEDICINE

## 2018-02-02 PROCEDURE — L1830 KO IMMOB CANVAS LONG PRE OTS: HCPCS | Performed by: ORTHOPAEDIC SURGERY

## 2018-02-02 PROCEDURE — 76060000037 HC ANESTHESIA 3 TO 3.5 HR: Performed by: ORTHOPAEDIC SURGERY

## 2018-02-02 PROCEDURE — 0SBC0ZZ EXCISION OF RIGHT KNEE JOINT, OPEN APPROACH: ICD-10-PCS | Performed by: ORTHOPAEDIC SURGERY

## 2018-02-02 PROCEDURE — 80048 BASIC METABOLIC PNL TOTAL CA: CPT | Performed by: EMERGENCY MEDICINE

## 2018-02-02 PROCEDURE — 74011000250 HC RX REV CODE- 250: Performed by: ORTHOPAEDIC SURGERY

## 2018-02-02 PROCEDURE — 93005 ELECTROCARDIOGRAM TRACING: CPT

## 2018-02-02 PROCEDURE — 77030032489 HC SLV COMPR SCD FT CUF COVD -B: Performed by: ORTHOPAEDIC SURGERY

## 2018-02-02 PROCEDURE — 77030018846 HC SOL IRR STRL H20 ICUM -A: Performed by: ORTHOPAEDIC SURGERY

## 2018-02-02 PROCEDURE — 74011250636 HC RX REV CODE- 250/636: Performed by: ANESTHESIOLOGY

## 2018-02-02 PROCEDURE — 77030034694 HC SCPL CANADY PLSM DISP USMD -E: Performed by: ORTHOPAEDIC SURGERY

## 2018-02-02 PROCEDURE — 74011250637 HC RX REV CODE- 250/637: Performed by: HOSPITALIST

## 2018-02-02 PROCEDURE — 77030018836 HC SOL IRR NACL ICUM -A: Performed by: ORTHOPAEDIC SURGERY

## 2018-02-02 PROCEDURE — 74011250637 HC RX REV CODE- 250/637: Performed by: ORTHOPAEDIC SURGERY

## 2018-02-02 PROCEDURE — 87075 CULTR BACTERIA EXCEPT BLOOD: CPT | Performed by: ORTHOPAEDIC SURGERY

## 2018-02-02 PROCEDURE — 77030031140 HC SUT VCRL3 J&J -A: Performed by: ORTHOPAEDIC SURGERY

## 2018-02-02 PROCEDURE — 77010033678 HC OXYGEN DAILY

## 2018-02-02 PROCEDURE — 77030038010: Performed by: ORTHOPAEDIC SURGERY

## 2018-02-02 PROCEDURE — 65610000006 HC RM INTENSIVE CARE

## 2018-02-02 PROCEDURE — C1776 JOINT DEVICE (IMPLANTABLE): HCPCS | Performed by: ORTHOPAEDIC SURGERY

## 2018-02-02 DEVICE — IMPLANTABLE DEVICE: Type: IMPLANTABLE DEVICE | Site: KNEE | Status: FUNCTIONAL

## 2018-02-02 DEVICE — CEMENT BNE 20GM HALF DOSE PMMA W/ GENT HI VISC RADPQ FAST: Type: IMPLANTABLE DEVICE | Site: KNEE | Status: FUNCTIONAL

## 2018-02-02 DEVICE — CEMENT BNE 40GM FULL DOSE PMMA W/O ANTIBIO HI VISC N RADPQ: Type: IMPLANTABLE DEVICE | Site: KNEE | Status: FUNCTIONAL

## 2018-02-02 RX ORDER — NALOXONE HYDROCHLORIDE 0.4 MG/ML
0.2 INJECTION, SOLUTION INTRAMUSCULAR; INTRAVENOUS; SUBCUTANEOUS AS NEEDED
Status: CANCELLED | OUTPATIENT
Start: 2018-02-02

## 2018-02-02 RX ORDER — DEXTROSE 50 % IN WATER (D50W) INTRAVENOUS SYRINGE
25-50 AS NEEDED
Status: DISCONTINUED | OUTPATIENT
Start: 2018-02-02 | End: 2018-02-03 | Stop reason: SDUPTHER

## 2018-02-02 RX ORDER — SODIUM CHLORIDE 9 MG/ML
1000 INJECTION, SOLUTION INTRAVENOUS ONCE
Status: COMPLETED | OUTPATIENT
Start: 2018-02-02 | End: 2018-02-02

## 2018-02-02 RX ORDER — ACETAMINOPHEN 10 MG/ML
1000 INJECTION, SOLUTION INTRAVENOUS ONCE
Status: COMPLETED | OUTPATIENT
Start: 2018-02-02 | End: 2018-02-02

## 2018-02-02 RX ORDER — SODIUM CHLORIDE, SODIUM LACTATE, POTASSIUM CHLORIDE, CALCIUM CHLORIDE 600; 310; 30; 20 MG/100ML; MG/100ML; MG/100ML; MG/100ML
125 INJECTION, SOLUTION INTRAVENOUS CONTINUOUS
Status: DISCONTINUED | OUTPATIENT
Start: 2018-02-02 | End: 2018-02-02

## 2018-02-02 RX ORDER — PROPOFOL 10 MG/ML
INJECTION, EMULSION INTRAVENOUS AS NEEDED
Status: DISCONTINUED | OUTPATIENT
Start: 2018-02-02 | End: 2018-02-02 | Stop reason: HOSPADM

## 2018-02-02 RX ORDER — ONDANSETRON 2 MG/ML
INJECTION INTRAMUSCULAR; INTRAVENOUS AS NEEDED
Status: DISCONTINUED | OUTPATIENT
Start: 2018-02-02 | End: 2018-02-02 | Stop reason: HOSPADM

## 2018-02-02 RX ORDER — HYDROMORPHONE HYDROCHLORIDE 1 MG/ML
0.5 INJECTION, SOLUTION INTRAMUSCULAR; INTRAVENOUS; SUBCUTANEOUS ONCE
Status: CANCELLED | OUTPATIENT
Start: 2018-02-02 | End: 2018-02-02

## 2018-02-02 RX ORDER — INSULIN LISPRO 100 [IU]/ML
INJECTION, SOLUTION INTRAVENOUS; SUBCUTANEOUS
Status: DISCONTINUED | OUTPATIENT
Start: 2018-02-02 | End: 2018-02-07 | Stop reason: HOSPADM

## 2018-02-02 RX ORDER — MAGNESIUM SULFATE 100 %
4 CRYSTALS MISCELLANEOUS AS NEEDED
Status: DISCONTINUED | OUTPATIENT
Start: 2018-02-02 | End: 2018-02-03 | Stop reason: SDUPTHER

## 2018-02-02 RX ORDER — DEXTROSE 50 % IN WATER (D50W) INTRAVENOUS SYRINGE
25-50 AS NEEDED
Status: CANCELLED | OUTPATIENT
Start: 2018-02-02

## 2018-02-02 RX ORDER — SODIUM CHLORIDE, SODIUM LACTATE, POTASSIUM CHLORIDE, CALCIUM CHLORIDE 600; 310; 30; 20 MG/100ML; MG/100ML; MG/100ML; MG/100ML
100 INJECTION, SOLUTION INTRAVENOUS CONTINUOUS
Status: DISCONTINUED | OUTPATIENT
Start: 2018-02-02 | End: 2018-02-02 | Stop reason: HOSPADM

## 2018-02-02 RX ORDER — EPHEDRINE SULFATE/0.9% NACL/PF 25 MG/5 ML
SYRINGE (ML) INTRAVENOUS AS NEEDED
Status: DISCONTINUED | OUTPATIENT
Start: 2018-02-02 | End: 2018-02-02 | Stop reason: HOSPADM

## 2018-02-02 RX ORDER — MINERAL OIL
OIL (ML) MISCELLANEOUS AS NEEDED
Status: DISCONTINUED | OUTPATIENT
Start: 2018-02-02 | End: 2018-02-02 | Stop reason: HOSPADM

## 2018-02-02 RX ORDER — MAGNESIUM SULFATE 100 %
4 CRYSTALS MISCELLANEOUS AS NEEDED
Status: CANCELLED | OUTPATIENT
Start: 2018-02-02

## 2018-02-02 RX ORDER — FENTANYL CITRATE 50 UG/ML
INJECTION, SOLUTION INTRAMUSCULAR; INTRAVENOUS AS NEEDED
Status: DISCONTINUED | OUTPATIENT
Start: 2018-02-02 | End: 2018-02-02 | Stop reason: HOSPADM

## 2018-02-02 RX ORDER — LIDOCAINE HYDROCHLORIDE 20 MG/ML
INJECTION, SOLUTION EPIDURAL; INFILTRATION; INTRACAUDAL; PERINEURAL AS NEEDED
Status: DISCONTINUED | OUTPATIENT
Start: 2018-02-02 | End: 2018-02-02 | Stop reason: HOSPADM

## 2018-02-02 RX ORDER — SODIUM CHLORIDE 9 MG/ML
250 INJECTION, SOLUTION INTRAVENOUS AS NEEDED
Status: DISCONTINUED | OUTPATIENT
Start: 2018-02-02 | End: 2018-02-07 | Stop reason: HOSPADM

## 2018-02-02 RX ORDER — SODIUM CHLORIDE 0.9 % (FLUSH) 0.9 %
5-10 SYRINGE (ML) INJECTION AS NEEDED
Status: DISCONTINUED | OUTPATIENT
Start: 2018-02-02 | End: 2018-02-02 | Stop reason: HOSPADM

## 2018-02-02 RX ORDER — TOBRAMYCIN 1.2 G/30ML
INJECTION, POWDER, LYOPHILIZED, FOR SOLUTION INTRAVENOUS AS NEEDED
Status: DISCONTINUED | OUTPATIENT
Start: 2018-02-02 | End: 2018-02-02 | Stop reason: HOSPADM

## 2018-02-02 RX ORDER — VANCOMYCIN HYDROCHLORIDE 1 G/20ML
INJECTION, POWDER, LYOPHILIZED, FOR SOLUTION INTRAVENOUS AS NEEDED
Status: DISCONTINUED | OUTPATIENT
Start: 2018-02-02 | End: 2018-02-02 | Stop reason: HOSPADM

## 2018-02-02 RX ORDER — FENTANYL CITRATE 50 UG/ML
25 INJECTION, SOLUTION INTRAMUSCULAR; INTRAVENOUS
Status: DISCONTINUED | OUTPATIENT
Start: 2018-02-02 | End: 2018-02-02 | Stop reason: HOSPADM

## 2018-02-02 RX ORDER — FAMOTIDINE 20 MG/1
20 TABLET, FILM COATED ORAL 2 TIMES DAILY
Status: DISCONTINUED | OUTPATIENT
Start: 2018-02-02 | End: 2018-02-07 | Stop reason: HOSPADM

## 2018-02-02 RX ORDER — INSULIN LISPRO 100 [IU]/ML
INJECTION, SOLUTION INTRAVENOUS; SUBCUTANEOUS ONCE
Status: CANCELLED | OUTPATIENT
Start: 2018-02-02 | End: 2018-02-02

## 2018-02-02 RX ORDER — HYDROMORPHONE HYDROCHLORIDE 1 MG/ML
0.5 INJECTION, SOLUTION INTRAMUSCULAR; INTRAVENOUS; SUBCUTANEOUS ONCE
Status: COMPLETED | OUTPATIENT
Start: 2018-02-02 | End: 2018-02-02

## 2018-02-02 RX ORDER — SODIUM CHLORIDE, SODIUM LACTATE, POTASSIUM CHLORIDE, CALCIUM CHLORIDE 600; 310; 30; 20 MG/100ML; MG/100ML; MG/100ML; MG/100ML
INJECTION, SOLUTION INTRAVENOUS
Status: DISCONTINUED | OUTPATIENT
Start: 2018-02-02 | End: 2018-02-02 | Stop reason: HOSPADM

## 2018-02-02 RX ADMIN — ONDANSETRON 4 MG: 2 INJECTION INTRAMUSCULAR; INTRAVENOUS at 10:18

## 2018-02-02 RX ADMIN — FENTANYL CITRATE 25 MCG: 50 INJECTION, SOLUTION INTRAMUSCULAR; INTRAVENOUS at 12:06

## 2018-02-02 RX ADMIN — LIDOCAINE HYDROCHLORIDE 60 MG: 20 INJECTION, SOLUTION EPIDURAL; INFILTRATION; INTRACAUDAL; PERINEURAL at 09:25

## 2018-02-02 RX ADMIN — FENTANYL CITRATE 25 MCG: 50 INJECTION, SOLUTION INTRAMUSCULAR; INTRAVENOUS at 12:57

## 2018-02-02 RX ADMIN — BETHANECHOL CHLORIDE 25 MG: 10 TABLET ORAL at 22:14

## 2018-02-02 RX ADMIN — FENTANYL CITRATE 25 MCG: 50 INJECTION, SOLUTION INTRAMUSCULAR; INTRAVENOUS at 12:51

## 2018-02-02 RX ADMIN — FENTANYL CITRATE 25 MCG: 50 INJECTION, SOLUTION INTRAMUSCULAR; INTRAVENOUS at 12:45

## 2018-02-02 RX ADMIN — WATER 2 G: 1 INJECTION INTRAMUSCULAR; INTRAVENOUS; SUBCUTANEOUS at 12:01

## 2018-02-02 RX ADMIN — FENTANYL CITRATE 25 MCG: 50 INJECTION, SOLUTION INTRAMUSCULAR; INTRAVENOUS at 12:38

## 2018-02-02 RX ADMIN — TRAMADOL HYDROCHLORIDE 50 MG: 50 TABLET, FILM COATED ORAL at 16:41

## 2018-02-02 RX ADMIN — TRAZODONE HYDROCHLORIDE 50 MG: 50 TABLET ORAL at 22:14

## 2018-02-02 RX ADMIN — FENTANYL CITRATE 25 MCG: 50 INJECTION, SOLUTION INTRAMUSCULAR; INTRAVENOUS at 13:15

## 2018-02-02 RX ADMIN — ACETAMINOPHEN 1000 MG: 10 INJECTION, SOLUTION INTRAVENOUS at 13:48

## 2018-02-02 RX ADMIN — SODIUM CHLORIDE, SODIUM LACTATE, POTASSIUM CHLORIDE, AND CALCIUM CHLORIDE 125 ML/HR: 600; 310; 30; 20 INJECTION, SOLUTION INTRAVENOUS at 07:39

## 2018-02-02 RX ADMIN — OSELTAMIVIR PHOSPHATE 75 MG: 75 CAPSULE ORAL at 07:42

## 2018-02-02 RX ADMIN — SODIUM CHLORIDE, SODIUM LACTATE, POTASSIUM CHLORIDE, AND CALCIUM CHLORIDE 125 ML/HR: 600; 310; 30; 20 INJECTION, SOLUTION INTRAVENOUS at 16:00

## 2018-02-02 RX ADMIN — FAMOTIDINE 20 MG: 20 TABLET, FILM COATED ORAL at 22:14

## 2018-02-02 RX ADMIN — Medication 10 MG: at 09:31

## 2018-02-02 RX ADMIN — Medication 10 MG: at 12:02

## 2018-02-02 RX ADMIN — SODIUM CHLORIDE 1000 MG: 900 INJECTION, SOLUTION INTRAVENOUS at 06:25

## 2018-02-02 RX ADMIN — OSELTAMIVIR PHOSPHATE 75 MG: 75 CAPSULE ORAL at 22:14

## 2018-02-02 RX ADMIN — PROPOFOL 100 MG: 10 INJECTION, EMULSION INTRAVENOUS at 09:25

## 2018-02-02 RX ADMIN — FENTANYL CITRATE 50 MCG: 50 INJECTION, SOLUTION INTRAMUSCULAR; INTRAVENOUS at 09:19

## 2018-02-02 RX ADMIN — FENTANYL CITRATE 25 MCG: 50 INJECTION, SOLUTION INTRAMUSCULAR; INTRAVENOUS at 13:05

## 2018-02-02 RX ADMIN — FENTANYL CITRATE 50 MCG: 50 INJECTION, SOLUTION INTRAMUSCULAR; INTRAVENOUS at 13:35

## 2018-02-02 RX ADMIN — SODIUM CHLORIDE, SODIUM LACTATE, POTASSIUM CHLORIDE, AND CALCIUM CHLORIDE: 600; 310; 30; 20 INJECTION, SOLUTION INTRAVENOUS at 11:30

## 2018-02-02 RX ADMIN — TRAMADOL HYDROCHLORIDE 50 MG: 50 TABLET, FILM COATED ORAL at 01:14

## 2018-02-02 RX ADMIN — SODIUM CHLORIDE 1000 MG: 900 INJECTION, SOLUTION INTRAVENOUS at 18:51

## 2018-02-02 RX ADMIN — FENTANYL CITRATE 25 MCG: 50 INJECTION, SOLUTION INTRAMUSCULAR; INTRAVENOUS at 12:09

## 2018-02-02 RX ADMIN — LISINOPRIL 5 MG: 5 TABLET ORAL at 07:41

## 2018-02-02 RX ADMIN — HYDROMORPHONE HYDROCHLORIDE 0.5 MG: 1 INJECTION, SOLUTION INTRAMUSCULAR; INTRAVENOUS; SUBCUTANEOUS at 09:12

## 2018-02-02 RX ADMIN — SODIUM CHLORIDE 1000 ML/HR: 900 INJECTION, SOLUTION INTRAVENOUS at 16:31

## 2018-02-02 RX ADMIN — SODIUM CHLORIDE, SODIUM LACTATE, POTASSIUM CHLORIDE, CALCIUM CHLORIDE: 600; 310; 30; 20 INJECTION, SOLUTION INTRAVENOUS at 10:03

## 2018-02-02 NOTE — PROGRESS NOTES
0800 Assumed care of pt from [facility]. Pt taken to OR prior to change of shift, will assess when pt is back on the unit     Notified by PACU that pt sent to ICU, transferred belongs to ICU 5, Lu Huthcinson RN aware.

## 2018-02-02 NOTE — PROGRESS NOTES
Hospitalist Progress Note    Patient: Lionel Justin MRN: [id number]  CSN: [id number]    YOB: 1933  Age: 80 y.o. Sex: male    DOA: 1/31/2018 LOS:  LOS: 2 days          Chief Complaint:    Infected Prosthetic Joint    Assessment/Plan     1. Sepsis  2. S/P Washout for Infected Prosthetic Joint  3. Syncope  4. HTN  5. Dyslipidemia  6. Influenza A  7. Post-Anesthesia Delirium    1. Continue Cefepime and vancomycin. Dr Stefanie Phan following. Continue IVF this afternoon. Continue to follow cultures and streamline abx per Dr Stefanie Phan. 2. Patient had procedure performed by Dr Shakira Sparks this AM. Abx spacer placed. Continue IV abx and IVF as described above. 3. Workup has been completed, no cardiac or pulmonary etiology for syncopal episode. Likely either secondary to infected prosthetic or influenza. 4. Continue current regimen, BP stable. 5. Continue Lipitor. 6. Patient tested positive yesterday evening for Influenza A. Continue Tamiflu and fluids and monitor. 7. Spoke with anesthesiologist caring for patient in the PACU area. Patient having difficulty after procedure. Repeatedly stating \"help me, please help me\". He is alert, not oriented at first. Upon conversation the patient is re-orientable. Given the fact that the patient is positive for influenza, and his advanced age, will transfer patient to the ICU for overnight monitoring to assure his delirium resolves. DVT Prophylaxis - SCDs  Dispo: Will monitor for resolution of delirium. Will need PT/OT once able. Came from a facility, likely to return unless improvement with PT. Patient Active Problem List   Diagnosis Code    Diabetes mellitus type 2, controlled ([address] E11.9    Hyperlipidemia E78.5    HTN (hypertension) I10    Rupture of right quadriceps tendon S76.111A    Sepsis ([address] A41.9    Infected prosthetic knee joint ([address] T84.59XA, Z96.659    Syncope R55    Influenza J11.1       Subjective:    Repeating \"Please help me\".     Review of systems:    Does not participate in ROS. Only states \"help me\". When questioned what hurts, he answers \"I don't know\". Vital signs/Intake and Output:  Visit Vitals    /49    Pulse 70    Temp 97.3 °F (36.3 °C)    Resp 11    Ht 5' 5\" (1.651 m)    Wt 93.3 kg (205 lb 12.8 oz)    SpO2 98%    BMI 34.25 kg/m2     Current Shift:  02/02 0701 - 02/02 1900  In: 2100 [I.V.:2100]  Out: -   Last three shifts:  01/31 1901 - 02/02 0700  In: 2303.3 [P.O.:640; I.V.:1663.3]  Out: 200 [Urine:200]    Exam:    General: Elderly male, alert, NAD  Head/Neck: NCAT, supple, No masses, No lymphadenopathy  CVS:Regular rate and rhythm, no M/R/G, S1/S2 heard, no thrill  Lungs:Clear to auscultation bilaterally, no wheezes, rhonchi, or rales  Abdomen: Soft, Nontender, No distention, Normal Bowel sounds, No hepatomegaly  Extremities: Right knee in immobilzer, pulses palpable 2+  Skin:normal texture and turgor, no rashes, no lesions  Neuro:grossly normal , follows commands  Psych:appropriate                Labs: Results:       Chemistry Recent Labs      02/02/18   0427  02/01/18   0530 01/31/18   1616  01/31/18   1549   GLU  116*  130*  153*  142*   NA  139  141  137  139   K  3.2*  3.8  4.4  4.2   CL  107  105  99*  99*   CO2  26  24  29  32   BUN  17  16  16  17   CREA  1.09  1.00  1.10  1.07   CA  7.8*  8.4*  9.1  9.0   AGAP  6  12  9  8   BUCR  16  16  15  16   AP   --    --   85  92   TP   --    --   7.7  7.3   ALB   --    --   2.4*  2.5*   GLOB   --    --   5.3*  4.8*   AGRAT   --    --   0.5*  0.5*      CBC w/Diff Recent Labs      02/01/18   0530 01/31/18   1616  01/31/18   1549   WBC  3.8*  5.2  5.1   RBC  3.56*  4.09*  4.11*   HGB  9.6*  11.1*  11.4*   HCT  30.0*  34.9*  35.3*   PLT  174  221  222   GRANS   --   80*   --    LYMPH   --   9*   --    EOS   --   1   --       Cardiac Enzymes No results for input(s): CPK, CKND1, SREE in the last 72 hours.     No lab exists for component: CKRMB, TROIP   Coagulation Recent Labs 01/31/18   1616  01/31/18   1549   PTP  14.4  13.8   INR  1.2  1.1   APTT   --   34.4       Lipid Panel No results found for: CHOL, CHOLPOCT, CHOLX, CHLST, CHOLV, 929238, HDL, LDL, LDLC, DLDLP, 519930, VLDLC, VLDL, TGLX, TRIGL, TRIGP, TGLPOCT, CHHD, CHHDX   BNP No results for input(s): BNPP in the last 72 hours.    Liver Enzymes Recent Labs      01/31/18   1616   TP  7.7   ALB  2.4*   AP  85   SGOT  37      Thyroid Studies No results found for: T4, T3U, TSH, TSHEXT     Procedures/imaging: see electronic medical records for all procedures/Xrays and details which were not copied into this note but were reviewed prior to creation of [address] Roque, PA-C

## 2018-02-02 NOTE — H&P (VIEW-ONLY)
Consult Note    Patient: Gaby Zaldivar               Sex: male          DOA: 1/31/2018         YOB: 1933      Age:  80 y.o.        LOS:  LOS: 1 day              HPI:     Gaby Zaldivar is a 80 y.o. male who has been seen for right knee infection. Patient was in hospital for preop testing as he is already scheduled for right knee I&D washout, with explant of prosthetic and antibiotic spacer with Dr Franklyn Mclaughlin. While at hospital for testing, patient had an episode of  AMS and was sent to ER for workup for syncopal episode and sepsis. Past Medical History:   Diagnosis Date    Arthritis     Coagulation disorder ([facility]     Lovenox    Diabetes ([facility] 2005    diet controlled, on insulin at this time (1/31/18)    Hypercholesteremia     Hypertension 2005    Sleep apnea 2008    no treatment    Urinary urgency        Past Surgical History:   Procedure Laterality Date    HX APPENDECTOMY      HX BACK SURGERY  11/2017    X 3-last surgery 11/2017    HX BACK SURGERY  1990's    cervcal neck    HX CATARACT REMOVAL      bilateral    HX ORTHOPAEDIC  2013    right knee replacement    HX ORTHOPAEDIC  05/2017    ligament repair    HX VASECTOMY         History reviewed. No pertinent family history. Social History     Social History    Marital status:      Spouse name: N/A    Number of children: N/A    Years of education: N/A     Social History Main Topics    Smoking status: Never Smoker    Smokeless tobacco: Never Used    Alcohol use No      Comment:      Drug use: No    Sexual activity: Not Currently     Other Topics Concern    None     Social History Narrative       Prior to Admission medications    Medication Sig Start Date End Date Taking? Authorizing Provider   alfuzosin SR (UROXATRAL) 10 mg SR tablet Take 10 mg by mouth daily. Indications: Urolithiasis 11/22/17   Historical Provider   atorvastatin (LIPITOR) 20 mg tablet Take 20 mg by mouth daily.     Historical Provider   bethanechol (URECHOLINE) 25 mg tablet Take 25 mg by mouth three (3) times daily. 11/21/17   Historical Provider   bisacodyl (DULCOLAX, BISACODYL,) 5 mg EC tablet Take 10 mg by mouth daily as needed for Constipation. Indications: constipation    Historical Provider   doxycycline (VIBRAMYCIN) 100 mg capsule 1 Cap two (2) times a day. 1/13/18   Historical Provider   DOCUSATE SODIUM (DSS PO) Take 2 Caps by mouth two (2) times daily as needed. Historical Provider   enoxaparin (LOVENOX) 40 mg/0.4 mL 40 mg by SubCUTAneous route daily. 11/22/17   Historical Provider   insulin lispro (HUMALOG) 100 unit/mL injection 0-5 Units by SubCUTAneous route four (4) times daily. Indications: type 2 diabetes mellitus    Historical Provider   lisinopril (PRINIVIL, ZESTRIL) 5 mg tablet Take 5 mg by mouth daily. Historical Provider   traMADol (ULTRAM) 50 mg tablet Take 50 mg by mouth every six (6) hours as needed for Pain. Indications: NEUROPATHIC PAIN    Historical Provider   aspirin delayed-release 81 mg tablet Take 81 mg by mouth daily. Historical Provider   oxyCODONE IR (ROXICODONE) 5 mg immediate release tablet Take 1-2 Tabs by mouth every four (4) hours as needed for Pain. Max Daily Amount: 60 mg. 5/11/17   Darren Roque PA-C   trandolapril (MAVIK) 2 mg tablet Take 2 mg by mouth daily. Indications: hypertension    Historical Provider   ezetimibe (ZETIA) 10 mg tablet Take  by mouth nightly. Historical Provider   rosuvastatin (CRESTOR) 10 mg tablet Take 10 mg by mouth daily. Indications: hyperlipidemia    Historical Provider   pregabalin (LYRICA) 150 mg capsule Take 150 mg by mouth daily. Indications: Diabetic Peripheral Neuropathy    Historical Provider   potassium chloride SR (KLOR-CON 8) 8 mEq tablet Take 8 mEq by mouth daily. Historical Provider   amLODIPine (NORVASC) 5 mg tablet Take 5 mg by mouth daily. Indications: hypertension    Historical Provider   furosemide (LASIX) 20 mg tablet Take 20 mg by mouth daily. Indications: Edema, h/o cellulitis    Historical Provider       Allergies   Allergen Reactions    Oxycodone Other (comments)     :makes him crazy per wife       Review of Systems  Pertinent items are noted in the History of Present Illness. Physical Exam:      Visit Vitals    /62    Pulse 93    Temp (!) 101.9 °F (38.8 °C)    Resp 19    Ht 5' 5\" (1.651 m)    Wt 93.1 kg (205 lb 4.8 oz)    SpO2 96%    BMI 34.16 kg/m2       Physical Exam:  General: Alert and Oriented X 3  Lungs: Clear to ausculation bilaterally  Cardiovascular: Regular Rate and Rhythm identified by peripheral pulses.   Abdomen: Soft, nontender in all four quadrants  Gential/Rectal: deferred  Musculoskeletal: Right knee immobilizer in place    Labs Reviewed:  BMP:   Lab Results   Component Value Date/Time     02/01/2018 05:30 AM    K 3.8 02/01/2018 05:30 AM     02/01/2018 05:30 AM    CO2 24 02/01/2018 05:30 AM    AGAP 12 02/01/2018 05:30 AM     (H) 02/01/2018 05:30 AM    BUN 16 02/01/2018 05:30 AM    CREA 1.00 02/01/2018 05:30 AM    GFRAA >60 02/01/2018 05:30 AM    GFRNA >60 02/01/2018 05:30 AM     CMP:   Lab Results   Component Value Date/Time     02/01/2018 05:30 AM    K 3.8 02/01/2018 05:30 AM     02/01/2018 05:30 AM    CO2 24 02/01/2018 05:30 AM    AGAP 12 02/01/2018 05:30 AM     (H) 02/01/2018 05:30 AM    BUN 16 02/01/2018 05:30 AM    CREA 1.00 02/01/2018 05:30 AM    GFRAA >60 02/01/2018 05:30 AM    GFRNA >60 02/01/2018 05:30 AM    CA 8.4 (L) 02/01/2018 05:30 AM    MG 1.5 (L) 01/31/2018 04:16 PM    ALB 2.4 (L) 01/31/2018 04:16 PM    TP 7.7 01/31/2018 04:16 PM    GLOB 5.3 (H) 01/31/2018 04:16 PM    AGRAT 0.5 (L) 01/31/2018 04:16 PM    SGOT 37 01/31/2018 04:16 PM    ALT 39 01/31/2018 04:16 PM     CBC:   Lab Results   Component Value Date/Time    WBC 3.8 (L) 02/01/2018 05:30 AM    HGB 9.6 (L) 02/01/2018 05:30 AM    HCT 30.0 (L) 02/01/2018 05:30 AM     02/01/2018 05:30 AM     Recent Glucose Results:   Lab Results   Component Value Date/Time     (H) 02/01/2018 05:30 AM     (H) 01/31/2018 04:16 PM     COAGS:   Lab Results   Component Value Date/Time    PTP 14.4 01/31/2018 04:16 PM    INR 1.2 01/31/2018 04:16 PM     Liver Panel:   Lab Results   Component Value Date/Time    ALB 2.4 (L) 01/31/2018 04:16 PM    CBIL 0.2 01/31/2018 04:16 PM    TP 7.7 01/31/2018 04:16 PM    GLOB 5.3 (H) 01/31/2018 04:16 PM    AGRAT 0.5 (L) 01/31/2018 04:16 PM    SGOT 37 01/31/2018 04:16 PM    ALT 39 01/31/2018 04:16 PM    AP 85 01/31/2018 04:16 PM     Pancreatic Markers: No results found for: AMYLPOCT, AML, LIPPOCT, LPSE         Assessment/Plan     Principal Problem:    Sepsis ([address] (1/31/2018)    Active Problems:    Diabetes mellitus type 2, controlled ([address] (5/9/2017)      Hyperlipidemia (5/9/2017)      HTN (hypertension) (5/9/2017)      Infected prosthetic knee joint ([address] (1/31/2018)      Syncope (1/31/2018)      Pending blood cultures final result, echo & carotid us   Plan for explant, antibiotic spacer, I&D washout of Right knee tomorrow 2/2/18  NPO after midnight  Type & Screen

## 2018-02-02 NOTE — PERIOP NOTES
TRANSFER - OUT REPORT:    Verbal report given to Juancarlos estrella RN on Christina Walker  being transferred to ICU (unit) for routine post - op       Report consisted of patients Situation, Background, Assessment and   Recommendations(SBAR). Information from the following report(s) SBAR, Intake/Output and MAR was reviewed with the receiving nurse.     Lines:   Peripheral IV 01/31/18 Left Antecubital (Active)   Site Assessment Clean, dry, & intact 2/2/2018  4:08 AM   Phlebitis Assessment 0 2/2/2018  4:08 AM   Infiltration Assessment 0 2/2/2018  4:08 AM   Dressing Status Clean, dry, & intact 2/2/2018  4:08 AM   Dressing Type Transparent;Tape 2/2/2018  4:08 AM   Hub Color/Line Status Pink;Capped 2/2/2018  4:08 AM   Action Taken Open ports on tubing capped 2/2/2018  4:08 AM   Alcohol Cap Used Yes 2/2/2018  4:08 AM       Peripheral IV 01/31/18 Left Hand (Active)   Site Assessment Clean, dry, & intact 2/2/2018  4:08 AM   Phlebitis Assessment 0 2/2/2018  4:08 AM   Infiltration Assessment 0 2/2/2018  4:08 AM   Dressing Status Clean, dry, & intact 2/2/2018  4:08 AM   Dressing Type Transparent;Tape 2/2/2018  4:08 AM   Hub Color/Line Status Blue;Capped 2/2/2018  4:08 AM   Action Taken Open ports on tubing capped 2/2/2018  4:08 AM   Alcohol Cap Used Yes 2/2/2018  4:08 AM       Peripheral IV 02/02/18 Right Hand (Active)   Site Assessment Clean, dry, & intact 2/2/2018  7:46 AM   Phlebitis Assessment 0 2/2/2018  7:46 AM   Dressing Status Clean, dry, & intact 2/2/2018  7:46 AM   Dressing Type Transparent;Tape 2/2/2018  7:46 AM   Hub Color/Line Status Infusing;Patent;Blue 2/2/2018  7:46 AM       Peripheral IV 02/02/18 Left Hand (Active)   Site Assessment Clean, dry, & intact 2/2/2018  3:00 PM   Phlebitis Assessment 0 2/2/2018  3:00 PM   Infiltration Assessment 0 2/2/2018  3:00 PM   Dressing Status Clean, dry, & intact 2/2/2018  3:00 PM   Dressing Type Tape 2/2/2018  3:00 PM   Hub Color/Line Status Infusing 2/2/2018  3:00 PM Opportunity for questions and clarification was provided.       Patient transported with:   Registered Nurse   Reviewed history  Dressing clean dry and intact with immobilizer in place   Ice pack   Patient is clear at discharge to ICU

## 2018-02-02 NOTE — PROGRESS NOTES
1445 Completed report, via telephone, with SALMA Zendejas, RN. We reviewed SBAR and plan of care. 1545 Pt arrived to unit, bed 5  1625 Spoke to Dr. Benny Sheldon regarding low BPs, order received for 1L NS bolus  1700 EKG completed per Dr. Maria Elena Benito request.  7754 Pt rectal temp 95.8  1730 Warming blanket applied  1745 Set up pt with meal tray  1940 Completed shift report and transferred care to SONA Porras RN. We reviewed SBAR, labs, meds, and plan of care for pt.

## 2018-02-02 NOTE — CONSULTS
[facility]  Pulmonary, Critical Care, and Sleep Medicine    Name: Brittany Zaragoza MRN: [id number]   : 1933 Hospital: [facility] [place]   Date: 2018        Pulmonary Critical Care Initial Patient Consult                                              Reason for CC Consult: Sepsis with infected knee joint    Subjective/History:   Brittany Zaragoza has been seen and evaluated at the request of NEELAM Diaz. Patient is a 80 y.o. male who had been admitted to [facility] on 18 for syncopal episode and fever. He had R knee replacement done about 5 years ago. In May 2017, he had RT knee surgery done for qudricep muscle repair and since then he is having knee swelling and pain. About 3 weeks ago, he was diagnosed with R knee cellultiis and treated with po abx for 10 days at rehab. He has seen orthopedic surgeon as out patient and knee aspiration done on  as out patient and cultures showed no growth by history. He is suspected to have R knee infection and scheduled to have exaplantation of TKA and spacer placement by Dr Viki Hernandez next week. He came to hospital for pre op lab draw on  and found to have AMS with fever and admitted for sepsis. He has CTA chest neg for PEs in the ER on admission. He was tested + for Influenza A. Today, patient went to OR for explant of prosthetic knee, spacer insertion, I&D. EBL was 50 mls. He had 2 lits in OR. Post-op, he was confused and hypotensive and moved to ICU for further management. Patient is awake, more alert and oriented to place, person currently. SBP 90s. HR SR  No chest pains or cough or shortness of breath. C/p RT knee spasm like pains. Review of Systems:  Review of systems not obtained due to patient factors.     Latest lactic acid:   Lactic acid   Date Value Ref Range Status   2018 1.6 0.4 - 2.0 MMOL/L Final       Completed IVF Resuscitation (30ml/kg) - yes  IVF choice: 0.9NS    Suspected source/s of severe sepsis: RT knee infection     Organ dysfunction: No    Antibiotics: cefepime, iv vancomycin    Completed physical exam: yes    Past Medical History:  Past Medical History:   Diagnosis Date    Arthritis     Coagulation disorder ([facility]     Lovenox    Diabetes ([facility] [address] 2005    diet controlled, on insulin at this time (1/31/18)    Hypercholesteremia     Hypertension 2005    Sleep apnea 2008    no treatment    Urinary urgency         Past Surgical History:  Past Surgical History:   Procedure Laterality Date    HX APPENDECTOMY      HX BACK SURGERY  11/2017    X 3-last surgery 11/2017    HX BACK SURGERY  1990's    cervcal neck    HX CATARACT REMOVAL      bilateral    HX ORTHOPAEDIC  2013    right knee replacement    HX ORTHOPAEDIC  05/2017    ligament repair    HX VASECTOMY          Medications:  Prior to Admission medications    Medication Sig Start Date End Date Taking? Authorizing Provider   alfuzosin SR (UROXATRAL) 10 mg SR tablet Take 10 mg by mouth daily. Indications: Urolithiasis 11/22/17   Historical Provider   atorvastatin (LIPITOR) 20 mg tablet Take 20 mg by mouth daily. Historical Provider   bethanechol (URECHOLINE) 25 mg tablet Take 25 mg by mouth three (3) times daily. 11/21/17   Historical Provider   bisacodyl (DULCOLAX, BISACODYL,) 5 mg EC tablet Take 10 mg by mouth daily as needed for Constipation. Indications: constipation    Historical Provider   doxycycline (VIBRAMYCIN) 100 mg capsule 1 Cap two (2) times a day. 1/13/18   Historical Provider   DOCUSATE SODIUM (DSS PO) Take 2 Caps by mouth two (2) times daily as needed. Historical Provider   enoxaparin (LOVENOX) 40 mg/0.4 mL 40 mg by SubCUTAneous route daily. 11/22/17   Historical Provider   insulin lispro (HUMALOG) 100 unit/mL injection 0-5 Units by SubCUTAneous route four (4) times daily. Indications: type 2 diabetes mellitus    Historical Provider   lisinopril (PRINIVIL, ZESTRIL) 5 mg tablet Take 5 mg by mouth daily.     Historical Provider   traMADol (ULTRAM) 50 mg tablet Take 50 mg by mouth every six (6) hours as needed for Pain. Indications: NEUROPATHIC PAIN    Historical Provider   aspirin delayed-release 81 mg tablet Take 81 mg by mouth daily. Historical Provider   oxyCODONE IR (ROXICODONE) 5 mg immediate release tablet Take 1-2 Tabs by mouth every four (4) hours as needed for Pain. Max Daily Amount: 60 mg. 5/11/17   Lenora Roque PA-C   trandolapril (MAVIK) 2 mg tablet Take 2 mg by mouth daily. Indications: hypertension    Historical Provider   ezetimibe (ZETIA) 10 mg tablet Take  by mouth nightly. Historical Provider   rosuvastatin (CRESTOR) 10 mg tablet Take 10 mg by mouth daily. Indications: hyperlipidemia    Historical Provider   pregabalin (LYRICA) 150 mg capsule Take 150 mg by mouth daily. Indications: Diabetic Peripheral Neuropathy    Historical Provider   potassium chloride SR (KLOR-CON 8) 8 mEq tablet Take 8 mEq by mouth daily. Historical Provider   amLODIPine (NORVASC) 5 mg tablet Take 5 mg by mouth daily. Indications: hypertension    Historical Provider   furosemide (LASIX) 20 mg tablet Take 20 mg by mouth daily.  Indications: Edema, h/o cellulitis    Historical Provider       Current Facility-Administered Medications   Medication Dose Route Frequency    lactated Ringers infusion  125 mL/hr IntraVENous CONTINUOUS    Vancomycin Trough Level - 30 minutes prior to 17:00 dose  1 Each Other ONCE    cefepime (MAXIPIME) 2 g in sterile water (preservative free) 10 mL IV syringe  2 g IntraVENous Q12H    vancomycin (VANCOCIN) 1,000 mg in 0.9% sodium chloride (MBP/ADV) 250 mL adv  1,000 mg IntraVENous Q12H    amLODIPine (NORVASC) tablet 5 mg  5 mg Oral DAILY    traZODone (DESYREL) tablet 50 mg  50 mg Oral QHS    oseltamivir (TAMIFLU) capsule 75 mg  75 mg Oral BID    alfuzosin SR (UROXATRAL) tablet 10 mg  10 mg Oral DAILY    atorvastatin (LIPITOR) tablet 20 mg  20 mg Oral DAILY    bethanechol (URECHOLINE) tablet 25 mg  25 mg Oral TID    lisinopril (PRINIVIL, ZESTRIL) tablet 5 mg  5 mg Oral DAILY    0.9% sodium chloride infusion  125 mL/hr IntraVENous CONTINUOUS       Allergy:  Allergies   Allergen Reactions    Oxycodone Other (comments)     :makes him crazy per wife        Social History:  Social History   Substance Use Topics    Smoking status: Never Smoker    Smokeless tobacco: Never Used    Alcohol use No      Comment:          Family History:  History reviewed. No pertinent family history. Objective:   Vital Signs:    Blood pressure 95/48, pulse 64, temperature 97 °F (36.1 °C), resp. rate 15, height 5' 5\" (1.651 m), weight 98.2 kg (216 lb 7.9 oz), SpO2 100 %. Body mass index is 36.03 kg/(m^2).    O2 Device: Nasal cannula   O2 Flow Rate (L/min): 2 l/min   Temp (24hrs), Av.9 °F (36.6 °C), Min:97 °F (36.1 °C), Max:100.2 °F (37.9 °C)     Patient Vitals for the past 8 hrs:   Temp Pulse Resp BP SpO2   18 1600 97 °F (36.1 °C) 64 15 95/48 100 %   18 1550 - 68 17 94/47 -   18 1545 97.2 °F (36.2 °C) 67 19 - -   18 1519 - 65 13 - 97 %   18 1518 - 67 16 - 100 %   18 1517 - 65 13 - 100 %   18 1516 - 69 16 - 100 %   18 1515 - 66 13 104/45 100 %   18 1514 - 66 14 - 99 %   18 1513 - 62 12 - 100 %   18 1512 - 65 15 - 99 %   18 1511 - 64 13 - 100 %   18 1510 97.2 °F (36.2 °C) 67 13 99/41 99 %   18 1509 - 64 13 - 99 %   18 1508 - 66 - 97/46 99 %   18 1505 - - - (!) 86/46 -   02/02/18 1455 - 70 17 - 100 %   02/02/18 1454 - 68 16 - 100 %   18 1453 - 66 11 - 99 %   18 1452 - 68 15 - 99 %   18 1451 - 63 13 - 100 %   18 1450 - 66 13 (!) 96/39 100 %   18 1449 - 67 14 - 100 %   18 1448 - 66 15 - 99 %   18 1447 - 62 12 - 98 %   18 1445 - 64 13 90/41 98 %   18 1440 - 68 15 (!) 91/37 99 %   18 1435 - 66 13 (!) 89/45 99 %   18 1430 - 69 14 100/45 100 %   18 1425 - 74 17 111/46 99 % 02/02/18 1420 - 68 18 (!) 79/47 96 %   02/02/18 1415 - 71 15 97/41 94 %   02/02/18 1410 - 72 16 (!) 117/93 98 %   02/02/18 1405 97.3 °F (36.3 °C) 70 11 112/49 98 %   02/02/18 1400 - 77 19 103/50 98 %   02/02/18 1355 - 71 25 111/48 98 %   02/02/18 1350 - 69 18 117/49 98 %   02/02/18 1345 - 70 26 109/56 95 %   02/02/18 1340 - 72 19 105/50 99 %   02/02/18 1335 - 76 23 119/55 90 %   02/02/18 1330 - 73 17 104/71 96 %   02/02/18 1325 - 82 22 106/59 98 %   02/02/18 1322 - - - - 98 %   02/02/18 1320 - 73 20 - 97 %   02/02/18 1315 - 78 17 116/55 98 %   02/02/18 1310 - 75 12 107/53 97 %   02/02/18 1305 - 79 19 123/60 98 %   02/02/18 1300 - 80 20 111/56 97 %   02/02/18 1255 - 76 21 114/69 100 %   02/02/18 1250 - 78 15 110/58 100 %   02/02/18 1245 - 79 14 115/58 100 %   02/02/18 1240 - 78 23 125/64 100 %   02/02/18 1235 - 78 24 123/61 100 %   02/02/18 1230 - 76 28 122/71 100 %   02/02/18 1229 - 75 22 - 100 %   02/02/18 1228 - 78 21 - 100 %   02/02/18 1227 - 78 26 - 100 %   02/02/18 1226 - 77 19 - 100 %   02/02/18 1225 - 80 19 134/66 99 %   02/02/18 1224 97.4 °F (36.3 °C) 74 18 136/61 100 %   02/02/18 0915 - - - - 96 %   02/02/18 0912 - - - - 96 %     Intake/Output:   Last shift:      02/02 0701 - 02/02 1900  In: 2650 [I.V.:2650]  Out: 300 [Urine:300]  Last 3 shifts: 01/31 1901 - 02/02 0700  In: 2303.3 [P.O.:640; I.V.:1663.3]  Out: 200 [Urine:200]    Intake/Output Summary (Last 24 hours) at 02/02/18 1700  Last data filed at 02/02/18 1634   Gross per 24 hour   Intake             3130 ml   Output              300 ml   Net             2830 ml        Physical Exam:   Comfortable; on 2 lits nc; acyanotic  HEENT: pupils not dilated, reactive, no scleral jaundice, dry oral mucosa, no nasal drainage  Neck: No adenopathy or thyroid swelling  CVS: S1S2 no murmurs; JVD not elevated  RS: Mod air entry bilaterally, decreased BS at bases, lungs clear-no wheezes or crackles  Abd: soft, non tender, no hepatosplenomegaly, no distension or guarding or rigidity  Neuro: non focal, awake, alert  Extrm: RT foot edema, RT LE in splint; no cyanosis or clubbing  Skin: no rash  Lymphatic: no cervical or supraclavicular adenopathy  Psych: normal mood      Data:     Recent Results (from the past 24 hour(s))   GLUCOSE, POC    Collection Time: 02/01/18  9:17 PM   Result Value Ref Range    Glucose (POC) 226 (H) 70 - 058 mg/dL   METABOLIC PANEL, BASIC    Collection Time: 02/02/18  4:27 AM   Result Value Ref Range    Sodium 139 136 - 145 mmol/L    Potassium 3.2 (L) 3.5 - 5.5 mmol/L    Chloride 107 100 - 108 mmol/L    CO2 26 21 - 32 mmol/L    Anion gap 6 3.0 - 18 mmol/L    Glucose 116 (H) 74 - 99 mg/dL    BUN 17 7.0 - 18 MG/DL    Creatinine 1.09 0.6 - 1.3 MG/DL    BUN/Creatinine ratio 16 12 - 20      GFR est AA >60 >60 ml/min/1.73m2    GFR est non-AA >60 >60 ml/min/1.73m2    Calcium 7.8 (L) 8.5 - 10.1 MG/DL   GLUCOSE, POC    Collection Time: 02/02/18  6:20 AM   Result Value Ref Range    Glucose (POC) 112 (H) 70 - 110 mg/dL   GLUCOSE, POC    Collection Time: 02/02/18 12:34 PM   Result Value Ref Range    Glucose (POC) 111 (H) 70 - 110 mg/dL   CBC WITH AUTOMATED DIFF    Collection Time: 02/02/18  4:20 PM   Result Value Ref Range    WBC 2.4 (L) 4.6 - 13.2 K/uL    RBC 2.79 (L) 4.70 - 5.50 M/uL    HGB 7.7 (L) 13.0 - 16.0 g/dL    HCT 23.9 (L) 36.0 - 48.0 %    MCV 85.7 74.0 - 97.0 FL    MCH 27.6 24.0 - 34.0 PG    MCHC 32.2 31.0 - 37.0 g/dL    RDW 19.3 (H) 11.6 - 14.5 %    PLATELET 470 (L) 661 - 420 K/uL    MPV 9.2 9.2 - 11.8 FL    NEUTROPHILS 62 40 - 73 %    LYMPHOCYTES 28 21 - 52 %    MONOCYTES 10 3 - 10 %    EOSINOPHILS 0 0 - 5 %    BASOPHILS 0 0 - 2 %    ABS. NEUTROPHILS 1.5 (L) 1.8 - 8.0 K/UL    ABS. LYMPHOCYTES 0.7 (L) 0.9 - 3.6 K/UL    ABS. MONOCYTES 0.2 0.05 - 1.2 K/UL    ABS. EOSINOPHILS 0.0 0.0 - 0.4 K/UL    ABS.  BASOPHILS 0.0 0.0 - 0.06 K/UL    DF AUTOMATED             No results for input(s): FIO2I, IFO2, HCO3I, IHCO3, HCOPOC, PCO2I, PCOPOC, IPHI, PHI, PHPOC, PO2I, PO2POC in the last 72 hours.     No lab exists for component: IPOC2    All Micro Results     Procedure Component Value Units Date/Time    CULTURE, ANAEROBIC [[id number]] Collected:  02/02/18 0915    Order Status:  Completed Specimen:  Knee  Updated:  02/02/18 1240    CULTURE, TISSUE Vesta Hof [[id number]] Collected:  02/02/18 0915    Order Status:  Completed Specimen:  Knee  Updated:  02/02/18 1239    CULTURE, ANAEROBIC [[id number]] Collected:  02/02/18 0915    Order Status:  Completed Specimen:  Knee  Updated:  02/02/18 1215    CULTURE, TISSUE Vesta Hof [[id number]] Collected:  02/02/18 0915    Order Status:  Completed Specimen:  Knee  Updated:  02/02/18 1210    CULTURE, ANAEROBIC [[id number]] Collected:  02/02/18 0915    Order Status:  Completed Specimen:  Knee  Updated:  02/02/18 1208    CULTURE, Letty Laws STAIN [[id number]] Collected:  02/02/18 0915    Order Status:  Completed Specimen:  Wound from Knee  Updated:  02/02/18 1208    CULTURE, ANAEROBIC [[id number]] Collected:  02/02/18 0915    Order Status:  Completed Specimen:  Knee  Updated:  02/02/18 1200    CULTURE, BODY FLUID Vesta Hof [[id number]] Collected:  02/02/18 0915    Order Status:  Completed Specimen:  Joint Fluid Updated:  02/02/18 1159    CULTURE, URINE [[id number]] Collected:  01/31/18 1705    Order Status:  Completed Specimen:  Urine from Clean catch Updated:  02/02/18 0901     Special Requests: NO SPECIAL REQUESTS        Culture result: NO GROWTH 2 DAYS       CULTURE, BLOOD [[id number]] Collected:  01/31/18 1616    Order Status:  Completed Specimen:  Blood from Blood Updated:  02/02/18 0127     Special Requests: NO SPECIAL REQUESTS        Culture result: NO GROWTH 2 DAYS       INFLUENZA A & B AG (RAPID TEST) [[id number]]  (Abnormal) Collected:  02/01/18 1554    Order Status:  Completed Specimen:  Nasopharyngeal from Nasal washing Updated:  02/01/18 1623     Influenza A Antigen POSITIVE (A)        Influenza B Antigen NEGATIVE Telemetry: normal sinus rhythm    ECHO 2/1/18   SUMMARY:  Left ventricle: Systolic function was normal by EF (biplane method of disks).  Ejection fraction was estimated to be 60  % in the range of 55 % to 65 %. There were no regional wall motion abnormalities. Wall thickness was mildly increased. There was mild concentric hypertrophy. Doppler parameters were consistent with abnormal left ventricular relaxation  (grade 1 diastolic dysfunction). Right ventricle: The size was normal. Systolic function was normal. Systolic pressure was mildly increased. Estimated  peak pressure was 34 mmHg. Tricuspid valve: There was mild pulmonary hypertension. Inferior vena cava, hepatic veins: The respirophasic change in diameter was less than 50%. INDICATIONS: Syncope. Imaging:  [x]I have personally reviewed the patients chest radiographs images and report   Knee Xray 2/2/18    Results from Hospital Encounter encounter on 01/31/18   XR KNEE RT MAX 2 VWS   Narrative PORTABLE 2 VIEW RIGHT KNEE:    COMPARISON study from 3/22/2011. Interval revision of the total knee prosthesis  with a longer femoral and tibial stems. The appearance of the prosthesis is  somewhat unusual, presumed antibiotic spacers. No fractures. Postsurgical  changes noted in the soft tissues. Impression Impression:  Interval revision of the total knee prosthesis, in satisfactory alignment, with  presumed antibiotic spacers. Postsurgical changes in the soft tissues. CXR 1/31/18  Comparison: Several prior exams, most recently 11/16/2017     Findings:  Portable upright AP view of the chest was obtained. The lungs are  mildly underexpanded but clear. There is no focal pneumonic consolidation. No  pneumothorax or pleural effusion. Cardiac size and mediastinal contours are  normal. There is no acute osseous abnormality     IMPRESSION  Impression:  Mildly underexpanded lungs without superimposed acute radiographic abnormality.     Results from Hospital Encounter encounter on 01/31/18   CTA CHEST W OR W WO CONT   Narrative EXAM: CTA Chest    INDICATION: Syncopal episode, altered mental status. Sepsis. Dilation for  possible pulmonary embolism. COMPARISON: Chest radiograph of 1/31/2018. No prior cross-sectional imaging of  the chest is available for review. TECHNIQUE: Axial CT imaging from the thoracic inlet through the diaphragm with  intravenous contrast utilizing CTA study for pulmonary artery evaluation. Coronal and sagittal MIP reformations were generated at a separate workstation. One or more dose reduction techniques were used on this CT: automated exposure  control, adjustment of the mAs and/or kVp according to patient's size, and  iterative reconstruction techniques. The specific techniques utilized on this CT  exam have been documented in the patient's electronic medical record.    _______________    FINDINGS:    EXAM QUALITY: Secondary to a combination of bolus timing and respiratory motion,  the examination is only adequate exclusion of large, central pulmonary embolism. PULMONARY ARTERIES: There is no evidence of large, central pulmonary embolism. The proximal lobar pulmonary arterial branches are patent. Poor pulmonary  arterial opacification as well as respiratory motion artifact results in  inadequate evaluation of the remaining pulmonary arterial tree. The main  pulmonary size is within normal limits. MEDIASTINUM: Cardiac size is normal. Coronary arterial atherosclerotic  calcifications are present. There is no pericardial effusion present. Thoracic  aorta is normal in course and caliber, with scattered atherosclerotic  calcifications demonstrated. LYMPH NODES: There is no supraclavicular, axillary, or mediastinal adenopathy. AIRWAY: Central airways are patent. LUNGS: Evaluation of the lung parenchyma is limited by respiratory motion  artifact. There is no focal pneumonic consolidation demonstrated.     PLEURA: No pleural effusion or pneumothorax. UPPER ABDOMEN: Calcified gallstone is demonstrated within a nondistended  gallbladder. A small hiatal hernia is present. .    OTHER: No acute osseous abnormality. Multilevel thoracic spondylosis noted,  with prior posterior decompression involving the lower thoracic spine.    _______________         Impression IMPRESSION:    1. Technically limited examination secondary to combination of contrast bolus  timing and respiratory motion artifact without evidence of large, central  pulmonary embolism. Normal main pulmonary arterial size. 2. No focal pneumonic consolidation. No pleural effusion. 3. Cholelithiasis. 4. Small hiatal hernia. IMPRESSION:   · Principal Problem:  · Infected prosthetic knee joint ([facility] [address] (1/31/2018)  · Hypotension  · Acute metabolic encephalopthy-post op state  · Anemia with no bleeding   ·   · Active Problems:  ·   Diabetes mellitus type 2, controlled ([facility] [address] (5/9/2017)  ·   Hyperlipidemia (5/9/2017)  ·   HTN (hypertension) (5/9/2017)  ·   Sepsis ([facility] (1/31/2018)  ·   Syncope (1/31/2018)  ·   Influenza (2/2/2018)   RECOMMENDATIONS:   Resp stable; continue nc O2; incent spirometry; titrate FiO2 for goal SPO2> 92%; consider BIPAP if patient develops CHF from fluid resuscitation  Cardiac-patient currently getting 1 lit NS; got 2 lits in O2; maintenance /hr; transfuse 1 unit prbc considering hypotension and anemia-d/w patient and wife-agreeable  If remains hypotensive, would need to consider Levophed for pressor  Check cardiac panel, lactic acid  EKG done-normal SR; no ST changes noted  Sepsis bundle per hospital protocol  Antibiotic choice: cefepime and iv vancomycin; negative cultures so far  Oral Tamiflu x 5 days; droplet precautions  Lactic acid ordered- initial and repeat Q6 hrs if elevated till normalized.   Glycemic control-poc glucose and SSI  Stress ulcer prophylaxis  DVT prophylaxis-SCD on left leg; low Hb; check US LEs; once Hb stable, would need Ortho clearance to start chemical DVT prophylaxis   Diet - oral ADA  Patient has no siu or central line  Watch IOs    Will defer respective systems problem management to primary and other consultant and follow patient in ICU with primary and other medical team  Further recommendations will be based on the patient's response to recommended treatment and results of the investigation ordered. Quality Care: PPI, DVT prophylaxis, HOB elevated, Infection control all reviewed and addressed. · Skin/Wound: small pressure sore in buttock per nurse  · Nutrition: oral  · Prophylaxis: DVT Prophylaxis and GI Prophylaxis addressed   · Restraints: None  · PT/OT eval and treat: per ortho protocol  · Lines/Tubes: none   ADVANCE DIRECTIVE: Full code;  Palliative care-not needed  FAMILY DISCUSSION: yes-d.w patient and wife at bedside  Events and notes from last 24 hours reviewed. Care plan discussed with nursing      [x]See my orders for details    My assessment, plan of care, findings, medications, side effects etc were discussed with:  [x]nursing []PT/OT    []respiratory therapy []   [x]family [x]Patient     [x]Total critical care time exclusive of procedures 42 minutes with complex decision making performed and > 50% time spent in face to face evaluation.     Javy Vickers MD

## 2018-02-02 NOTE — ROUTINE PROCESS
Bedside and Verbal shift change report given to Aiden Boyer RN (oncoming nurse) by SALMA Ravi RN (offgoing nurse). Report included the following information SBAR, Kardex, Intake/Output, MAR and Recent Results.

## 2018-02-02 NOTE — PERIOP NOTES
Xray has been taken. Patient does know his name still saying please help me [facility] was paged. Also Dr. Sindy Burger aware of patients confusion he came to the bedside no new orders at this time.

## 2018-02-02 NOTE — INTERVAL H&P NOTE
H&P Update:  Lexine Babinski was seen and examined. History and physical has been reviewed. The patient has been examined. There have been no significant clinical changes since the completion of the originally dated History and Physical.  Patient identified by surgeon; surgical site was confirmed by patient and surgeon.   Plan for explant antibiotic spacer right knee    Signed By: Yasmeen Craig DO     February 2, 2018 9:05 AM

## 2018-02-02 NOTE — PROGRESS NOTES
Bedside shift change report given to WAYLON Smiley . Report included the following information Intake/Output, MAR and Recent Results.

## 2018-02-02 NOTE — PERIOP NOTES
Anesthesiologist , Doctor Ángela Vincent, is  Aware of K 3.2 no new order given. Pt able signed consent. Nara care one. Incont. Of bladder. Turn and reposition. Call bell with in reach. 0840 am paged Doctor Ángela Vincent re- pain meds.

## 2018-02-02 NOTE — PROGRESS NOTES
ID follow up note     Chart reviewed remotely   Patient tested positive for influenza A and started on tamiflu on 2/1/18   Going to OR today for explanation of arthroplasty and spacer placement   Blood cultures remains No growth to date   Fever has improved     Plan   Agree with adding Tamiflu for influenza A   And recommend to complete total 5 days treatment  Continue vancomycin and cefepime pending tissue cultures   Follow up tissue cultures and streamline antibiotics accordingly   Please call with questions or concerns for ID issue   Will see patient on 2/6/18     Lonnie Richards MD  Infectious diseases specialist   Pager [phone]

## 2018-02-02 NOTE — ANESTHESIA PREPROCEDURE EVALUATION
Anesthetic History   No history of anesthetic complications            Review of Systems / Medical History  Patient summary reviewed, nursing notes reviewed and pertinent labs reviewed    Pulmonary        Sleep apnea           Neuro/Psych   Within defined limits           Cardiovascular    Hypertension                   GI/Hepatic/Renal  Within defined limits              Endo/Other    Diabetes    Arthritis     Other Findings            Physical Exam    Airway  Mallampati: II  TM Distance: 4 - 6 cm  Neck ROM: normal range of motion   Mouth opening: Normal     Cardiovascular    Rhythm: regular  Rate: normal         Dental    Dentition: Caps/crowns     Pulmonary  Breath sounds clear to auscultation               Abdominal  GI exam deferred       Other Findings            Anesthetic Plan    ASA: 3  Anesthesia type: general          Induction: Intravenous  Anesthetic plan and risks discussed with: Patient      Confirmed with surgeon that this is urgent surgery given patient has the flu.

## 2018-02-02 NOTE — ANESTHESIA POSTPROCEDURE EVALUATION
Post-Anesthesia Evaluation & Assessment    Visit Vitals    BP (!) 96/39    Pulse 66    Temp 36.3 °C (97.3 °F)    Resp 17    Ht 5' 5\" (1.651 m)    Wt 93.3 kg (205 lb 12.8 oz)    SpO2 99%    BMI 34.25 kg/m2       No untreated/active PONV    Post-operative hydration adequate. Adequate post-operative analgesia per PACU discharge criteria    Mental status & level of consciousness: alert and oriented x 3    Respiratory status: patent unassisted airway     No apparent anesthetic complications; pt stable for transfer to ICU.               Naye Fairbanks MD

## 2018-02-02 NOTE — ROUTINE PROCESS
TRANSFER - OUT REPORT:    Verbal report given to ALFA Cardona RN(name) on Lauri Shutter  being transferred to OR(unit) for ordered procedure       Report consisted of patients Situation, Background, Assessment and   Recommendations(SBAR). Information from the following report(s) SBAR, Kardex, Intake/Output, MAR and Recent Results was reviewed with the receiving nurse. Lines:   Peripheral IV 01/31/18 Left Antecubital (Active)   Site Assessment Clean, dry, & intact 2/2/2018  4:08 AM   Phlebitis Assessment 0 2/2/2018  4:08 AM   Infiltration Assessment 0 2/2/2018  4:08 AM   Dressing Status Clean, dry, & intact 2/2/2018  4:08 AM   Dressing Type Transparent;Tape 2/2/2018  4:08 AM   Hub Color/Line Status Pink;Capped 2/2/2018  4:08 AM   Action Taken Open ports on tubing capped 2/2/2018  4:08 AM   Alcohol Cap Used Yes 2/2/2018  4:08 AM       Peripheral IV 01/31/18 Left Hand (Active)   Site Assessment Clean, dry, & intact 2/2/2018  4:08 AM   Phlebitis Assessment 0 2/2/2018  4:08 AM   Infiltration Assessment 0 2/2/2018  4:08 AM   Dressing Status Clean, dry, & intact 2/2/2018  4:08 AM   Dressing Type Transparent;Tape 2/2/2018  4:08 AM   Hub Color/Line Status Blue;Capped 2/2/2018  4:08 AM   Action Taken Open ports on tubing capped 2/2/2018  4:08 AM   Alcohol Cap Used Yes 2/2/2018  4:08 AM       Peripheral IV 02/02/18 Right Hand (Active)   Site Assessment Clean, dry, & intact 2/2/2018  7:46 AM   Phlebitis Assessment 0 2/2/2018  7:46 AM   Dressing Status Clean, dry, & intact 2/2/2018  7:46 AM   Dressing Type Transparent;Tape 2/2/2018  7:46 AM   Hub Color/Line Status Infusing;Patent;Blue 2/2/2018  7:46 AM        Opportunity for questions and clarification was provided.       Patient transported with:   Patient-specific medications from Pharmacy  Tech

## 2018-02-02 NOTE — PROGRESS NOTES
Pharmacy Dosing Services: Vancomycin    Consult for Vancomycin Dosing by Pharmacy by Dr. Aundrea Anne provided for this 80y.o. year old male , for indication of prosthetic joint infection. Ht Readings from Last 1 Encounters:   02/02/18 165.1 cm (65\")        Wt Readings from Last 1 Encounters:   02/02/18 98.2 kg (216 lb 7.9 oz)        Serum Creatinine Lab Results   Component Value Date/Time    Creatinine 1.04 02/02/2018 04:20 PM      Creatinine Clearance Estimated Creatinine Clearance: 57 mL/min (based on Cr of 1.04). BUN Lab Results   Component Value Date/Time    BUN 15 02/02/2018 04:20 PM      WBC Lab Results   Component Value Date/Time    WBC 2.4 02/02/2018 04:20 PM      H/H Lab Results   Component Value Date/Time    HGB 7.7 02/02/2018 04:20 PM      Platelets Lab Results   Component Value Date/Time    PLATELET 711 63/66/2338 04:20 PM      Temp 95.8 °F (35.4 °C)     Trough result: 16.5 mcg/ml @ 16:20 [id number]. Therapeutic, continue current dosing of Vancomycin 1 gm IV q12. John Padilla, Pharm. D.   Clinical Pharmacist  [phone]

## 2018-02-02 NOTE — PROGRESS NOTES
Progress Note      Patient: Magda Weiss               Sex: male          DOA: 1/31/2018     YOB: 1933      Age:  80 y.o.        LOS:  LOS: 2 days     Status Post: Procedure(s):  EXPLANT EJ PROSTHETIC RIGHT KNEE, SPACER INSERTION, INCISION AND DRAINAGE RIGHT KNEE, PATIENT IS IN ROOM 346  Surgery Date: 2/2/2018            Subjective:     Magad Weiss is a 80 y.o. male scheduled for right knee explantation, antibiotic spacer and washout today. Objective:      Visit Vitals    /57 (BP 1 Location: Right arm, BP Patient Position: At rest)    Pulse 69    Temp 98 °F (36.7 °C)    Resp 18    Ht 5' 5\" (1.651 m)    Wt 93.3 kg (205 lb 12.8 oz)    SpO2 97%    BMI 34.25 kg/m2       Physical Exam:   MSK: Right knee in immobilizer  Wiggles Toes/Ankle  Foot sensation intact to light touch  No foot edema/ +1 Posterior Tibial Pulse    Intake and Output:  Current Shift:     Last three shifts:  01/31 1901 - 02/02 0700  In: 2303.3 [P.O.:640; I.V.:1663.3]  Out: 200 [Urine:200]  Voiding Status:  + void without need for Cox catheter    Lab/Data Reviewed:  Recent Labs      02/02/18   0427  02/01/18   0530   HGB   --   9.6*   HCT   --   30.0*   NA  139  141   K  3.2*  3.8   BUN  17  16   CREA  1.09  1.00   GLU  116*  130*         Medications Reviewed    Assessment/Plan     Principal Problem:    Sepsis ([facility] [address] (1/31/2018)    Active Problems:    Diabetes mellitus type 2, controlled ([address] (5/9/2017)      Hyperlipidemia (5/9/2017)      HTN (hypertension) (5/9/2017)      Infected prosthetic knee joint ([facility] [address] (1/31/2018)      Syncope (1/31/2018)        · Surgery scheduled for this morning, right knee explantation, abx spacer and washout  · Continue use of right knee immobilizer  · Meds per hospitalist  · DVT prophylaxis with Lovenox  · Antibiotics per Infectious disease  · Plan for picc line placement    The patient was seen and examined by Dr. Aaron Diaz today as well and he is in agreement with above.

## 2018-02-02 NOTE — PROGRESS NOTES
Patient with uneventful shift. Pain controlled with PRN Tramadol as ordered with the addition of a one time, 1 mg. IV dose of Morphine with good relief. Report given to ALFA Centeno RN in [address] [facility] [address] prior to patient transport for scheduled I&D procedure to right knee. Care of patient to be assumed by Nasima Rodríguez RN when patient returns from OR. Lisinopril and Tamiflu sent to OR with patient per ALFA Centeno RN request.

## 2018-02-02 NOTE — PERIOP NOTES
Patient is very pleasant and cooperative now, denies pain or discomfort, maintains patient airway. Having intermittent cough non productive. The family was called and updated on patients status and told transferring patient to ICU bed #5.

## 2018-02-02 NOTE — PERIOP NOTES
TRANSFER - IN REPORT:    Verbal report received from ORN & CRNA on Leetta Cools  being received from OR (unit) for routine post - op      Report consisted of patients Situation, Background, Assessment and   Recommendations(SBAR). Information from the following report(s) SBAR, Intake/Output and MAR was reviewed with the receiving nurse. Opportunity for questions and clarification was provided. Assessment completed upon patients arrival to unit and care assumed. Calling out an saying repeatedly \" please stop me\". Tried to reorient to place and time  Patient knows his name not aware of where he is. Dressing to right leg clean dry and intact knee immobilizer in place.

## 2018-02-02 NOTE — BRIEF OP NOTE
BRIEF OPERATIVE NOTE    Date of Procedure: 2/2/2018   Preoperative Diagnosis: COMPLICATION FROM PROSTHETIC DEVICE RIGHT KNEE  Postoperative Diagnosis: COMPLICATION FROM PROSTHETIC DEVICE RIGHT KNEE    Procedure(s):  EXPLANT EJ PROSTHETIC RIGHT KNEE, SPACER INSERTION, INCISION AND DRAINAGE RIGHT KNEE, PATIENT IS IN ROOM 346  Surgeon(s) and Role:     * DO Mirza Garcia         Assistant Staff: Physician Assistant: Kevin Purcell PA-C      Surgical Staff:  Serge Olivares: Lavena Curling  Physician Assistant: Kevin Purcell PA-C  Scrub Tech-1: Georgi Land  Surg Asst-1: Tempie Erp  Event Time In   Incision Start 0259   Incision Close 1210     Anesthesia: General   Estimated Blood Loss: 50ml  IVF 2000 ml  Specimens:   ID Type Source Tests Collected by Time Destination   1 : right knee fluid Joint Fluid Joint, Knee CULTURE, ANAEROBIC, GRAM STAIN Gume Kemp, DO 2/2/2018 1000 Microbiology   2 : right knee swab Wound Knee  CULTURE, WOUND W GRAM STAIN, CULTURE, ANAEROBIC Gume Kemp, DO 2/2/2018 1000 Microbiology   3 : right knee synovial  Tissue Knee, right CULTURE, ANAEROBIC, CULTURE, TISSUE W GRAM STAIN Gume Kemp, DO 2/2/2018 1012 Microbiology   4 : right femur Tissue Femoral, right CULTURE, TISSUE W GRAM STAIN, CULTURE, ANAEROBIC Gume Kemp, DO 2/2/2018 1016 Microbiology      Findings: infection   Bone erosion  copius purulence   Complications: none  Implants:   Implant Name Type Inv.  Item Serial No.  Lot No. LRB No. Used Action   CEMENT BNE GENTAMC CMW2 20GM --  - [id number]  CEMENT BNE GENTAMC CMW2 20GM --   [facility] [employer] [facility] [id number] Right [id number] Implanted   INSERT TIB STBL PFC RP 4X17.5 -- SIGMA - [id number]  INSERT TIB STBL PFC RP 4X17.5 -- SIGMA  [facility] [employer] [id number] Right 1 Implanted   CEMENT BNE SMARTSET HV 40GM -- SMARTSET - [id number]   CEMENT BNE SMARTSET HV 40GM -- SMARTSET   [facility] [employer] [id number] Right [id number] Implanted

## 2018-02-03 ENCOUNTER — APPOINTMENT (OUTPATIENT)
Dept: GENERAL RADIOLOGY | Age: 83
DRG: 463 | End: 2018-02-03
Attending: INTERNAL MEDICINE
Payer: MEDICARE

## 2018-02-03 LAB
ANION GAP SERPL CALC-SCNC: 7 MMOL/L (ref 3–18)
BASOPHILS # BLD: 0 K/UL (ref 0–0.06)
BASOPHILS NFR BLD: 0 % (ref 0–2)
BUN SERPL-MCNC: 16 MG/DL (ref 7–18)
BUN/CREAT SERPL: 17 (ref 12–20)
CALCIUM SERPL-MCNC: 7 MG/DL (ref 8.5–10.1)
CHLORIDE SERPL-SCNC: 105 MMOL/L (ref 100–108)
CK MB CFR SERPL CALC: 2 % (ref 0–4)
CK MB SERPL-MCNC: 2.3 NG/ML (ref 5–25)
CK SERPL-CCNC: 116 U/L (ref 39–308)
CO2 SERPL-SCNC: 24 MMOL/L (ref 21–32)
CREAT SERPL-MCNC: 0.92 MG/DL (ref 0.6–1.3)
DIFFERENTIAL METHOD BLD: ABNORMAL
EOSINOPHIL # BLD: 0 K/UL (ref 0–0.4)
EOSINOPHIL NFR BLD: 0 % (ref 0–5)
ERYTHROCYTE [DISTWIDTH] IN BLOOD BY AUTOMATED COUNT: 19 % (ref 11.6–14.5)
GLUCOSE BLD STRIP.AUTO-MCNC: 112 MG/DL (ref 70–110)
GLUCOSE BLD STRIP.AUTO-MCNC: 118 MG/DL (ref 70–110)
GLUCOSE BLD STRIP.AUTO-MCNC: 129 MG/DL (ref 70–110)
GLUCOSE BLD STRIP.AUTO-MCNC: 98 MG/DL (ref 70–110)
GLUCOSE SERPL-MCNC: 143 MG/DL (ref 74–99)
HCT VFR BLD AUTO: 20.5 % (ref 36–48)
HCT VFR BLD AUTO: 26.5 % (ref 36–48)
HGB BLD-MCNC: 6.5 G/DL (ref 13–16)
HGB BLD-MCNC: 8.8 G/DL (ref 13–16)
LACTATE SERPL-SCNC: 1.1 MMOL/L (ref 0.4–2)
LYMPHOCYTES # BLD: 0.6 K/UL (ref 0.9–3.6)
LYMPHOCYTES NFR BLD: 19 % (ref 21–52)
MCH RBC QN AUTO: 27 PG (ref 24–34)
MCHC RBC AUTO-ENTMCNC: 31.7 G/DL (ref 31–37)
MCV RBC AUTO: 85.1 FL (ref 74–97)
MONOCYTES # BLD: 0.5 K/UL (ref 0.05–1.2)
MONOCYTES NFR BLD: 15 % (ref 3–10)
NEUTS SEG # BLD: 2.2 K/UL (ref 1.8–8)
NEUTS SEG NFR BLD: 66 % (ref 40–73)
PLATELET # BLD AUTO: 100 K/UL (ref 135–420)
PMV BLD AUTO: 9.2 FL (ref 9.2–11.8)
POTASSIUM SERPL-SCNC: 3.7 MMOL/L (ref 3.5–5.5)
RBC # BLD AUTO: 2.41 M/UL (ref 4.7–5.5)
SODIUM SERPL-SCNC: 136 MMOL/L (ref 136–145)
TROPONIN I SERPL-MCNC: 0.05 NG/ML (ref 0–0.06)
WBC # BLD AUTO: 3.3 K/UL (ref 4.6–13.2)

## 2018-02-03 PROCEDURE — 30233N1 TRANSFUSION OF NONAUTOLOGOUS RED BLOOD CELLS INTO PERIPHERAL VEIN, PERCUTANEOUS APPROACH: ICD-10-PCS | Performed by: INTERNAL MEDICINE

## 2018-02-03 PROCEDURE — 74011250637 HC RX REV CODE- 250/637: Performed by: PHYSICIAN ASSISTANT

## 2018-02-03 PROCEDURE — P9047 ALBUMIN (HUMAN), 25%, 50ML: HCPCS | Performed by: INTERNAL MEDICINE

## 2018-02-03 PROCEDURE — 74011250637 HC RX REV CODE- 250/637: Performed by: INTERNAL MEDICINE

## 2018-02-03 PROCEDURE — P9016 RBC LEUKOCYTES REDUCED: HCPCS | Performed by: ORTHOPAEDIC SURGERY

## 2018-02-03 PROCEDURE — 74011250636 HC RX REV CODE- 250/636: Performed by: EMERGENCY MEDICINE

## 2018-02-03 PROCEDURE — 65660000000 HC RM CCU STEPDOWN

## 2018-02-03 PROCEDURE — 74011000250 HC RX REV CODE- 250: Performed by: EMERGENCY MEDICINE

## 2018-02-03 PROCEDURE — 36415 COLL VENOUS BLD VENIPUNCTURE: CPT | Performed by: INTERNAL MEDICINE

## 2018-02-03 PROCEDURE — 36430 TRANSFUSION BLD/BLD COMPNT: CPT

## 2018-02-03 PROCEDURE — 83605 ASSAY OF LACTIC ACID: CPT | Performed by: INTERNAL MEDICINE

## 2018-02-03 PROCEDURE — 82962 GLUCOSE BLOOD TEST: CPT

## 2018-02-03 PROCEDURE — 74011250636 HC RX REV CODE- 250/636: Performed by: INTERNAL MEDICINE

## 2018-02-03 PROCEDURE — 74011250637 HC RX REV CODE- 250/637: Performed by: HOSPITALIST

## 2018-02-03 PROCEDURE — 85027 COMPLETE CBC AUTOMATED: CPT | Performed by: INTERNAL MEDICINE

## 2018-02-03 PROCEDURE — 71045 X-RAY EXAM CHEST 1 VIEW: CPT

## 2018-02-03 PROCEDURE — 80048 BASIC METABOLIC PNL TOTAL CA: CPT | Performed by: PHYSICIAN ASSISTANT

## 2018-02-03 PROCEDURE — 85018 HEMOGLOBIN: CPT | Performed by: INTERNAL MEDICINE

## 2018-02-03 PROCEDURE — 77010033678 HC OXYGEN DAILY

## 2018-02-03 PROCEDURE — 84484 ASSAY OF TROPONIN QUANT: CPT | Performed by: PHYSICIAN ASSISTANT

## 2018-02-03 PROCEDURE — 77030011256 HC DRSG MEPILEX <16IN NO BORD MOLN -A

## 2018-02-03 RX ORDER — DEXTROSE 50 % IN WATER (D50W) INTRAVENOUS SYRINGE
25-50 AS NEEDED
Status: DISCONTINUED | OUTPATIENT
Start: 2018-02-03 | End: 2018-02-07 | Stop reason: HOSPADM

## 2018-02-03 RX ORDER — INSULIN LISPRO 100 [IU]/ML
INJECTION, SOLUTION INTRAVENOUS; SUBCUTANEOUS
Status: DISCONTINUED | OUTPATIENT
Start: 2018-02-03 | End: 2018-02-03 | Stop reason: SDUPTHER

## 2018-02-03 RX ORDER — MAGNESIUM SULFATE 100 %
4 CRYSTALS MISCELLANEOUS AS NEEDED
Status: DISCONTINUED | OUTPATIENT
Start: 2018-02-03 | End: 2018-02-07 | Stop reason: HOSPADM

## 2018-02-03 RX ORDER — ALBUMIN HUMAN 250 G/1000ML
12.5 SOLUTION INTRAVENOUS ONCE
Status: COMPLETED | OUTPATIENT
Start: 2018-02-03 | End: 2018-02-03

## 2018-02-03 RX ORDER — SODIUM CHLORIDE 9 MG/ML
250 INJECTION, SOLUTION INTRAVENOUS ONCE
Status: COMPLETED | OUTPATIENT
Start: 2018-02-03 | End: 2018-02-03

## 2018-02-03 RX ORDER — SODIUM CHLORIDE 9 MG/ML
250 INJECTION, SOLUTION INTRAVENOUS CONTINUOUS
Status: DISCONTINUED | OUTPATIENT
Start: 2018-02-03 | End: 2018-02-03

## 2018-02-03 RX ADMIN — WATER 2 G: 1 INJECTION INTRAMUSCULAR; INTRAVENOUS; SUBCUTANEOUS at 00:46

## 2018-02-03 RX ADMIN — TRAMADOL HYDROCHLORIDE 50 MG: 50 TABLET, FILM COATED ORAL at 00:46

## 2018-02-03 RX ADMIN — SODIUM CHLORIDE 100 ML/HR: 900 INJECTION, SOLUTION INTRAVENOUS at 12:24

## 2018-02-03 RX ADMIN — OSELTAMIVIR PHOSPHATE 75 MG: 75 CAPSULE ORAL at 09:04

## 2018-02-03 RX ADMIN — ALFUZOSIN HYDROCHLORIDE 10 MG: 10 TABLET ORAL at 09:03

## 2018-02-03 RX ADMIN — TRAMADOL HYDROCHLORIDE 50 MG: 50 TABLET, FILM COATED ORAL at 23:34

## 2018-02-03 RX ADMIN — BETHANECHOL CHLORIDE 25 MG: 10 TABLET ORAL at 21:33

## 2018-02-03 RX ADMIN — BETHANECHOL CHLORIDE 25 MG: 10 TABLET ORAL at 09:02

## 2018-02-03 RX ADMIN — FAMOTIDINE 20 MG: 20 TABLET, FILM COATED ORAL at 09:04

## 2018-02-03 RX ADMIN — ALBUMIN (HUMAN) 12.5 G: 0.25 INJECTION, SOLUTION INTRAVENOUS at 03:04

## 2018-02-03 RX ADMIN — SODIUM CHLORIDE 1000 MG: 900 INJECTION, SOLUTION INTRAVENOUS at 17:04

## 2018-02-03 RX ADMIN — ATORVASTATIN CALCIUM 20 MG: 20 TABLET, FILM COATED ORAL at 09:04

## 2018-02-03 RX ADMIN — WATER 2 G: 1 INJECTION INTRAMUSCULAR; INTRAVENOUS; SUBCUTANEOUS at 23:34

## 2018-02-03 RX ADMIN — ACETAMINOPHEN 650 MG: 325 TABLET ORAL at 00:46

## 2018-02-03 RX ADMIN — FAMOTIDINE 20 MG: 20 TABLET, FILM COATED ORAL at 21:33

## 2018-02-03 RX ADMIN — TRAMADOL HYDROCHLORIDE 50 MG: 50 TABLET, FILM COATED ORAL at 17:04

## 2018-02-03 RX ADMIN — SODIUM CHLORIDE 1000 MG: 900 INJECTION, SOLUTION INTRAVENOUS at 06:36

## 2018-02-03 RX ADMIN — BETHANECHOL CHLORIDE 25 MG: 10 TABLET ORAL at 16:47

## 2018-02-03 RX ADMIN — SODIUM CHLORIDE 250 ML: 900 INJECTION, SOLUTION INTRAVENOUS at 02:42

## 2018-02-03 RX ADMIN — TRAZODONE HYDROCHLORIDE 50 MG: 50 TABLET ORAL at 21:33

## 2018-02-03 RX ADMIN — OSELTAMIVIR PHOSPHATE 75 MG: 75 CAPSULE ORAL at 21:33

## 2018-02-03 RX ADMIN — WATER 2 G: 1 INJECTION INTRAMUSCULAR; INTRAVENOUS; SUBCUTANEOUS at 12:00

## 2018-02-03 NOTE — PROGRESS NOTES
Hospitalist Progress Note    Patient: Coretta Mcguire MRN: [id number]  CSN: [id number]    YOB: 1933  Age: 80 y.o. Sex: male    DOA: 1/31/2018 LOS:  LOS: 3 days          Chief Complaint:  Post op joint infection          Assessment/Plan     Disposition :  Patient Active Problem List   Diagnosis Code    Diabetes mellitus type 2, controlled ([facility] [address] E11.9    Hyperlipidemia E78.5    HTN (hypertension) I10    Rupture of right quadriceps tendon S76.111A    Sepsis ([facility] [address] A41.9    Infected prosthetic knee joint ([facility] [address] T84.59XA, Z96.659    Syncope R55    Influenza J11.1     1. Sepsis  2. S/P Washout for Infected Prosthetic Joint  3. Syncope  4. HTN  5. Dyslipidemia  6. Influenza A  7. Post-Anesthesia Delirium     1. Continue Cefepime and vancomycin. Dr Didier Echols following. Continue IVF this afternoon. Continue to follow cultures and streamline abx per Dr Didier Echols. 2. Patient had procedure performed by Dr Harriet Evans yesterday. Abx spacer placed. Continue IV abx and IVF as described above. 3. Workup has been completed, no cardiac or pulmonary etiology for syncopal episode. Likely either secondary to infected prosthetic or influenza. 4. Continue current regimen, BP stable. 5. Continue Lipitor. 6. Patient tested positive for Influenza A. Continue Tamiflu and fluids and monitor. 7. Clinically stable for transfer to [facility]     DVT Prophylaxis - SCDs  Dispo: Will monitor for resolution of delirium. Will need PT/OT once able. Came from a facility, likely to return unless improvement with PT. Subjective:    No events over night. Patient doing well this AM.  No new complaints.     Review of systems:    Constitutional: denies fevers, chills, myalgias  Respiratory: denies SOB, cough  Cardiovascular: denies chest pain, palpitations  Gastrointestinal: denies nausea, vomiting, diarrhea      Vital signs/Intake and Output:  Visit Vitals    /49    Pulse 78    Temp 98.5 °F (36.9 °C)    Resp 10    Ht 5' 5\" (1.651 m)    Wt 98.2 kg (216 lb 7.9 oz)    SpO2 99%    BMI 36.03 kg/m2     Current Shift:  02/03 0701 - 02/03 1900  In: 9093 [P.O.:25; I.V.:750]  Out: 300 [Urine:300]  Last three shifts:  02/01 1901 - 02/03 0700  In: 3580 [P.O.:120; I.V.:4070]  Out: 550 [Urine:550]    Exam:    General: Well developed, alert, NAD, OX3  Head/Neck: NCAT, supple, No masses, No lymphadenopathy  CVS:Regular rate and rhythm, no M/R/G, S1/S2 heard, no thrill  Lungs:Clear to auscultation bilaterally, no wheezes, rhonchi, or rales  Abdomen: Soft, Nontender, No distention, Normal Bowel sounds, No hepatomegaly  Extremities: No C/C/E, pulses palpable 2+  Skin:normal texture and turgor, no rashes, no lesions  Neuro:grossly normal , follows commands  Psych:appropriate                Labs: Results:       Chemistry Recent Labs      02/03/18   0310  02/02/18   1620  02/02/18   0427   01/31/18   1616  01/31/18   1549   GLU  143*  106*  116*   < >  153*  142*   NA  136  136  139   < >  137  139   K  3.7  3.5  3.2*   < >  4.4  4.2   CL  105  103  107   < >  99*  99*   CO2  24  27  26   < >  29  32   BUN  16  15  17   < >  16  17   CREA  0.92  1.04  1.09   < >  1.10  1.07   CA  7.0*  7.7*  7.8*   < >  9.1  9.0   AGAP  7  6  6   < >  9  8   BUCR  17  14  16   < >  15  16   AP   --    --    --    --   85  92   TP   --    --    --    --   7.7  7.3   ALB   --    --    --    --   2.4*  2.5*   GLOB   --    --    --    --   5.3*  4.8*   AGRAT   --    --    --    --   0.5*  0.5*    < > = values in this interval not displayed.       CBC w/Diff Recent Labs      02/03/18   1400  02/03/18   0310  02/02/18 2010 02/02/18   1620  02/01/18   0530  01/31/18   1616   WBC   --   3.3*   --   2.4*  3.8*  5.2   RBC   --   2.41*   --   2.79*  3.56*  4.09*   HGB  8.8*  6.5*  8.4*  7.7*  9.6*  11.1*   HCT  26.5*  20.5*  26.1*  23.9*  30.0*  34.9*   PLT   --   100*   --   107*  174  221   GRANS   --   66   --   62   --   80*   LYMPH   --   19*   --   28   --   9*   EOS   --   0 --   0   --   1      Cardiac Enzymes Recent Labs      02/03/18   0310  02/02/18   2300   CPK  116  133   CKND1  2.0  2.1      Coagulation Recent Labs      01/31/18   1616  01/31/18   1549   PTP  14.4  13.8   INR  1.2  1.1   APTT   --   34.4       Lipid Panel No results found for: CHOL, CHOLPOCT, CHOLX, CHLST, CHOLV, 511225, HDL, LDL, LDLC, DLDLP, 208771, VLDLC, VLDL, TGLX, TRIGL, TRIGP, TGLPOCT, CHHD, CHHDX   BNP No results for input(s): BNPP in the last 72 hours.    Liver Enzymes Recent Labs      01/31/18   1616   TP  7.7   ALB  2.4*   AP  85   SGOT  37      Thyroid Studies No results found for: T4, T3U, TSH, TSHEXT     Procedures/imaging: see electronic medical records for all procedures/Xrays and details which were not copied into this note but were reviewed prior to creation of Doc Vazquez MD

## 2018-02-03 NOTE — PROGRESS NOTES
[facility]  Pulmonary, Critical Care, and Sleep Medicine    Name: Kassy Ariza MRN: [id number]   : 1933 Hospital: [facility] [place]   Date: 2/3/2018        PCCM progress note                                              Reason for CC Consult: Sepsis with infected knee joint    Subjective/History:   Kassy Ariza has been seen and evaluated at the request of PA Franciso Ahumada. Patient is a 80 y.o. male who had been admitted to [facility] on 18 for syncopal episode and fever. He had R knee replacement done about 5 years ago. In May 2017, he had RT knee surgery done for qudricep muscle repair and since then he is having knee swelling and pain. About 3 weeks ago, he was diagnosed with R knee cellultiis and treated with po abx for 10 days at rehab. He has seen orthopedic surgeon as out patient and knee aspiration done on  as out patient and cultures showed no growth by history. He is suspected to have R knee infection and scheduled to have exaplantation of TKA and spacer placement by Dr Leopold Peru next week. He came to hospital for pre op lab draw on  and found to have AMS with fever and admitted for sepsis. He has CTA chest neg for PEs in the ER on admission. He was tested + for Influenza A. Today, patient went to OR for explant of prosthetic knee, spacer insertion, I&D. EBL was 50 mls. He had 2 lits in OR. Post-op, he was confused and hypotensive and moved to ICU for further management. 2/3/18:  aaox3  No fever  Intermittent low bp, responding to small fluid bolus  H&h drop but no active external bleeding, receiving 2nd prbc  No chest pains or cough or shortness of breath. C/p RT knee spasm like pains. Review of Systems:  Review of systems not obtained due to patient factors.     Latest lactic acid:   Lactic acid   Date Value Ref Range Status   2018 2.3 (HH) 0.4 - 2.0 MMOL/L Final     Comment:     CALLED TO AND CORRECTLY REPEATED BY:  Brennen Chavira RN ICU TO [facility] AT [id number] ON [id number]     01/31/2018 1.6 0.4 - 2.0 MMOL/L Final       Completed IVF Resuscitation (30ml/kg) - yes  IVF choice: 0.9NS    Suspected source/s of severe sepsis: RT knee infection     Organ dysfunction: No    Antibiotics: cefepime, iv vancomycin    Completed physical exam: yes    Past Medical History:  Past Medical History:   Diagnosis Date    Arthritis     Coagulation disorder ([facility] [address]     Lovenox    Diabetes ([facility] [address] 2005    diet controlled, on insulin at this time (1/31/18)    Hypercholesteremia     Hypertension 2005    Sleep apnea 2008    no treatment    Urinary urgency         Past Surgical History:  Past Surgical History:   Procedure Laterality Date    HX APPENDECTOMY      HX BACK SURGERY  11/2017    X 3-last surgery 11/2017    HX BACK SURGERY  1990's    cervcal neck    HX CATARACT REMOVAL      bilateral    HX ORTHOPAEDIC  2013    right knee replacement    HX ORTHOPAEDIC  05/2017    ligament repair    HX VASECTOMY          Medications:  Prior to Admission medications    Medication Sig Start Date End Date Taking? Authorizing Provider   alfuzosin SR (UROXATRAL) 10 mg SR tablet Take 10 mg by mouth daily. Indications: Urolithiasis 11/22/17   Historical Provider   atorvastatin (LIPITOR) 20 mg tablet Take 20 mg by mouth daily. Historical Provider   bethanechol (URECHOLINE) 25 mg tablet Take 25 mg by mouth three (3) times daily. 11/21/17   Historical Provider   bisacodyl (DULCOLAX, BISACODYL,) 5 mg EC tablet Take 10 mg by mouth daily as needed for Constipation. Indications: constipation    Historical Provider   doxycycline (VIBRAMYCIN) 100 mg capsule 1 Cap two (2) times a day. 1/13/18   Historical Provider   DOCUSATE SODIUM (DSS PO) Take 2 Caps by mouth two (2) times daily as needed. Historical Provider   enoxaparin (LOVENOX) 40 mg/0.4 mL 40 mg by SubCUTAneous route daily. 11/22/17   Historical Provider   insulin lispro (HUMALOG) 100 unit/mL injection 0-5 Units by SubCUTAneous route four (4) times daily. Indications: type 2 diabetes mellitus    Historical Provider   lisinopril (PRINIVIL, ZESTRIL) 5 mg tablet Take 5 mg by mouth daily. Historical Provider   traMADol (ULTRAM) 50 mg tablet Take 50 mg by mouth every six (6) hours as needed for Pain. Indications: NEUROPATHIC PAIN    Historical Provider   aspirin delayed-release 81 mg tablet Take 81 mg by mouth daily. Historical Provider   oxyCODONE IR (ROXICODONE) 5 mg immediate release tablet Take 1-2 Tabs by mouth every four (4) hours as needed for Pain. Max Daily Amount: 60 mg. 5/11/17   Lo Deirdre Roque PA-C   trandolapril (MAVIK) 2 mg tablet Take 2 mg by mouth daily. Indications: hypertension    Historical Provider   ezetimibe (ZETIA) 10 mg tablet Take  by mouth nightly. Historical Provider   rosuvastatin (CRESTOR) 10 mg tablet Take 10 mg by mouth daily. Indications: hyperlipidemia    Historical Provider   pregabalin (LYRICA) 150 mg capsule Take 150 mg by mouth daily. Indications: Diabetic Peripheral Neuropathy    Historical Provider   potassium chloride SR (KLOR-CON 8) 8 mEq tablet Take 8 mEq by mouth daily. Historical Provider   amLODIPine (NORVASC) 5 mg tablet Take 5 mg by mouth daily. Indications: hypertension    Historical Provider   furosemide (LASIX) 20 mg tablet Take 20 mg by mouth daily.  Indications: Edema, h/o cellulitis    Historical Provider       Current Facility-Administered Medications   Medication Dose Route Frequency    insulin lispro (HUMALOG) injection   SubCUTAneous AC&HS    famotidine (PEPCID) tablet 20 mg  20 mg Oral BID    cefepime (MAXIPIME) 2 g in sterile water (preservative free) 10 mL IV syringe  2 g IntraVENous Q12H    vancomycin (VANCOCIN) 1,000 mg in 0.9% sodium chloride (MBP/ADV) 250 mL adv  1,000 mg IntraVENous Q12H    traZODone (DESYREL) tablet 50 mg  50 mg Oral QHS    oseltamivir (TAMIFLU) capsule 75 mg  75 mg Oral BID    alfuzosin SR (UROXATRAL) tablet 10 mg  10 mg Oral DAILY    atorvastatin (LIPITOR) tablet 20 mg  20 mg Oral DAILY    bethanechol (URECHOLINE) tablet 25 mg  25 mg Oral TID    0.9% sodium chloride infusion  100 mL/hr IntraVENous CONTINUOUS       Allergy:  Allergies   Allergen Reactions    Oxycodone Other (comments)     :makes him crazy per wife        Social History:  Social History   Substance Use Topics    Smoking status: Never Smoker    Smokeless tobacco: Never Used    Alcohol use No      Comment:          Family History:  History reviewed. No pertinent family history. Objective:   Vital Signs:    Blood pressure 93/43, pulse 80, temperature 98.5 °F (36.9 °C), resp. rate 18, height 5' 5\" (1.651 m), weight 98.2 kg (216 lb 7.9 oz), SpO2 100 %. Body mass index is 36.03 kg/(m^2). O2 Device: Nasal cannula   O2 Flow Rate (L/min): 2 l/min   Temp (24hrs), Av.9 °F (36.6 °C), Min:95.8 °F (35.4 °C), Max:98.9 °F (37.2 °C)       Patient Vitals for the past 8 hrs:   Temp Pulse Resp BP SpO2   18 1059 98.5 °F (36.9 °C) - - - -   18 0845 98.4 °F (36.9 °C) 80 18 93/43 100 %   18 0700 - 83 19 - 100 %   18 0655 98.4 °F (36.9 °C) - - - -   18 0645 - 76 16 111/54 100 %   18 0630 98.4 °F (36.9 °C) 62 14 105/47 -   18 0615 - 87 17 116/66 100 %   18 0600 98.2 °F (36.8 °C) 84 21 109/77 100 %   18 0545 - 69 17 90/40 98 %   18 0530 - 72 16 (!) 101/39 99 %   18 0515 98.6 °F (37 °C) 65 14 (!) 83/43 100 %   18 0500 - 68 16 (!) 89/41 100 %   18 0445 98.6 °F (37 °C) 73 16 (!) 99/38 100 %   18 0430 98.4 °F (36.9 °C) 73 17 (!) 101/38 100 %   18 0416 98.4 °F (36.9 °C) - - - -   18 0415 - 70 15 (!) 88/41 100 %   18 0400 - 74 14 107/44 100 %   18 0345 - 67 15 (!) 88/41 98 %   18 0334 98.2 °F (36.8 °C) - - - -     Intake/Output:   Last shift:      701 - 1900  In: 600   Out: -   Last 3 shifts: 1901 - 700  In: 0050 [P.O.:120;  I.V.:4070]  Out: 550 [Urine:550]    Intake/Output Summary (Last 24 hours) at 02/03/18 1131  Last data filed at 02/03/18 1122   Gross per 24 hour   Intake             3670 ml   Output              550 ml   Net             3120 ml        Physical Exam:   Comfortable; on 2 lits nc; acyanotic  HEENT: pupils not dilated, reactive, no scleral jaundice, dry oral mucosa, no nasal drainage  Neck: No adenopathy or thyroid swelling  CVS: S1S2 no murmurs; JVD not elevated  RS: Mod air entry bilaterally, decreased BS at bases, lungs clear-no wheezes or crackles  Abd: soft, non tender, no hepatosplenomegaly, no distension or guarding or rigidity  Neuro: non focal, awake, alert  Extrm: RT foot edema, RT LE in splint; no cyanosis or clubbing  Skin: no rash  Lymphatic: no cervical or supraclavicular adenopathy  Psych: normal mood      Data:     Recent Results (from the past 24 hour(s))   GLUCOSE, POC    Collection Time: 02/02/18 12:34 PM   Result Value Ref Range    Glucose (POC) 111 (H) 70 - 110 mg/dL   CBC WITH AUTOMATED DIFF    Collection Time: 02/02/18  4:20 PM   Result Value Ref Range    WBC 2.4 (L) 4.6 - 13.2 K/uL    RBC 2.79 (L) 4.70 - 5.50 M/uL    HGB 7.7 (L) 13.0 - 16.0 g/dL    HCT 23.9 (L) 36.0 - 48.0 %    MCV 85.7 74.0 - 97.0 FL    MCH 27.6 24.0 - 34.0 PG    MCHC 32.2 31.0 - 37.0 g/dL    RDW 19.3 (H) 11.6 - 14.5 %    PLATELET 545 (L) 622 - 420 K/uL    MPV 9.2 9.2 - 11.8 FL    NEUTROPHILS 62 40 - 73 %    LYMPHOCYTES 28 21 - 52 %    MONOCYTES 10 3 - 10 %    EOSINOPHILS 0 0 - 5 %    BASOPHILS 0 0 - 2 %    ABS. NEUTROPHILS 1.5 (L) 1.8 - 8.0 K/UL    ABS. LYMPHOCYTES 0.7 (L) 0.9 - 3.6 K/UL    ABS. MONOCYTES 0.2 0.05 - 1.2 K/UL    ABS. EOSINOPHILS 0.0 0.0 - 0.4 K/UL    ABS.  BASOPHILS 0.0 0.0 - 0.06 K/UL    DF AUTOMATED     VANCOMYCIN, TROUGH    Collection Time: 02/02/18  4:20 PM   Result Value Ref Range    Vancomycin,trough 16.5 10.0 - 20.0 ug/mL    Reported dose date: [id number]      Reported dose time: 625      Reported dose: 1000 MG UNITS   CARDIAC PANEL,(CK, CKMB & TROPONIN) Collection Time: 02/02/18  4:20 PM   Result Value Ref Range     39 - 308 U/L    CK - MB 2.3 <3.6 ng/ml    CK-MB Index 2.0 0.0 - 4.0 %    Troponin-I, Qt. 0.05 0.00 - 1.00 NG/ML   METABOLIC PANEL, BASIC    Collection Time: 02/02/18  4:20 PM   Result Value Ref Range    Sodium 136 136 - 145 mmol/L    Potassium 3.5 3.5 - 5.5 mmol/L    Chloride 103 100 - 108 mmol/L    CO2 27 21 - 32 mmol/L    Anion gap 6 3.0 - 18 mmol/L    Glucose 106 (H) 74 - 99 mg/dL    BUN 15 7.0 - 18 MG/DL    Creatinine 1.04 0.6 - 1.3 MG/DL    BUN/Creatinine ratio 14 12 - 20      GFR est AA >60 >60 ml/min/1.73m2    GFR est non-AA >60 >60 ml/min/1.73m2    Calcium 7.7 (L) 8.5 - 10.1 MG/DL   MAGNESIUM    Collection Time: 02/02/18  4:20 PM   Result Value Ref Range    Magnesium 1.3 (L) 1.6 - 2.6 mg/dL   EKG, 12 LEAD, SUBSEQUENT    Collection Time: 02/02/18  4:58 PM   Result Value Ref Range    Ventricular Rate 67 BPM    Atrial Rate 67 BPM    P-R Interval 190 ms    QRS Duration 76 ms    Q-T Interval 422 ms    QTC Calculation (Bezet) 445 ms    Calculated P Axis 21 degrees    Calculated R Axis 19 degrees    Calculated T Axis -17 degrees    Diagnosis       Normal sinus rhythm  Low voltage QRS  Nonspecific T wave abnormality  Abnormal ECG  When compared with ECG of 31-JAN-2018 17:00,  Vent.  rate has decreased BY  35 BPM     GLUCOSE, POC    Collection Time: 02/02/18  4:59 PM   Result Value Ref Range    Glucose (POC) 112 (H) 70 - 110 mg/dL   LACTIC ACID    Collection Time: 02/02/18  5:20 PM   Result Value Ref Range    Lactic acid 2.3 (HH) 0.4 - 2.0 MMOL/L   HGB & HCT    Collection Time: 02/02/18  8:10 PM   Result Value Ref Range    HGB 8.4 (L) 13.0 - 16.0 g/dL    HCT 26.1 (L) 36.0 - 48.0 %   GLUCOSE, POC    Collection Time: 02/02/18 10:13 PM   Result Value Ref Range    Glucose (POC) 148 (H) 70 - 110 mg/dL   CARDIAC PANEL,(CK, CKMB & TROPONIN)    Collection Time: 02/02/18 11:00 PM   Result Value Ref Range     39 - 308 U/L    CK - MB 2.8 <3.6 ng/ml CK-MB Index 2.1 0.0 - 4.0 %    Troponin-I, Qt. 0.04 0.00 - 1.15 NG/ML   METABOLIC PANEL, BASIC    Collection Time: 02/03/18  3:10 AM   Result Value Ref Range    Sodium 136 136 - 145 mmol/L    Potassium 3.7 3.5 - 5.5 mmol/L    Chloride 105 100 - 108 mmol/L    CO2 24 21 - 32 mmol/L    Anion gap 7 3.0 - 18 mmol/L    Glucose 143 (H) 74 - 99 mg/dL    BUN 16 7.0 - 18 MG/DL    Creatinine 0.92 0.6 - 1.3 MG/DL    BUN/Creatinine ratio 17 12 - 20      GFR est AA >60 >60 ml/min/1.73m2    GFR est non-AA >60 >60 ml/min/1.73m2    Calcium 7.0 (L) 8.5 - 10.1 MG/DL   CARDIAC PANEL,(CK, CKMB & TROPONIN)    Collection Time: 02/03/18  3:10 AM   Result Value Ref Range     39 - 308 U/L    CK - MB 2.3 <3.6 ng/ml    CK-MB Index 2.0 0.0 - 4.0 %    Troponin-I, Qt. 0.05 0.00 - 0.06 NG/ML   CBC W/O DIFF    Collection Time: 02/03/18  3:10 AM   Result Value Ref Range    WBC 3.3 (L) 4.6 - 13.2 K/uL    RBC 2.41 (L) 4.70 - 5.50 M/uL    HGB 6.5 (L) 13.0 - 16.0 g/dL    HCT 20.5 (L) 36.0 - 48.0 %    MCV 85.1 74.0 - 97.0 FL    MCH 27.0 24.0 - 34.0 PG    MCHC 31.7 31.0 - 37.0 g/dL    RDW 19.0 (H) 11.6 - 14.5 %    PLATELET 415 (L) 122 - 420 K/uL    MPV 9.2 9.2 - 11.8 FL   DIFFERENTIAL, AUTO    Collection Time: 02/03/18  3:10 AM   Result Value Ref Range    NEUTROPHILS 66 40 - 73 %    LYMPHOCYTES 19 (L) 21 - 52 %    MONOCYTES 15 (H) 3 - 10 %    EOSINOPHILS 0 0 - 5 %    BASOPHILS 0 0 - 2 %    ABS. NEUTROPHILS 2.2 1.8 - 8.0 K/UL    ABS. LYMPHOCYTES 0.6 (L) 0.9 - 3.6 K/UL    ABS. MONOCYTES 0.5 0.05 - 1.2 K/UL    ABS. EOSINOPHILS 0.0 0.0 - 0.4 K/UL    ABS.  BASOPHILS 0.0 0.0 - 0.06 K/UL    DF AUTOMATED     GLUCOSE, POC    Collection Time: 02/03/18  6:54 AM   Result Value Ref Range    Glucose (POC) 112 (H) 70 - 110 mg/dL   GLUCOSE, POC    Collection Time: 02/03/18 10:58 AM   Result Value Ref Range    Glucose (POC) 129 (H) 70 - 110 mg/dL           No results for input(s): FIO2I, IFO2, HCO3I, IHCO3, HCOPOC, PCO2I, PCOPOC, IPHI, PHI, PHPOC, PO2I, PO2POC in the last 72 hours.     No lab exists for component: IPOC2    All Micro Results     Procedure Component Value Units Date/Time    CULTURE, ANAEROBIC [[id number]] Collected:  02/02/18 0915    Order Status:  Completed Specimen:  Knee  Updated:  02/03/18 0736     Special Requests: RT KNEE SYNOVIAL     Culture result:         CULTURE IN PROGRESS,FURTHER UPDATES TO FOLLOW    CULTURE, ANAEROBIC [[id number]] Collected:  02/02/18 0915    Order Status:  Completed Specimen:  Knee  Updated:  02/03/18 0735     Special Requests: NO SPECIAL REQUESTS        Culture result:         CULTURE IN PROGRESS,FURTHER UPDATES TO FOLLOW    CULTURE, ANAEROBIC [[id number]] Collected:  02/02/18 0915    Order Status:  Completed Specimen:  Knee  Updated:  02/03/18 0735     Special Requests: NO SPECIAL REQUESTS        Culture result:         CULTURE IN PROGRESS,FURTHER UPDATES TO FOLLOW    CULTURE, ANAEROBIC [[id number]] Collected:  02/02/18 0915    Order Status:  Completed Specimen:  Knee  Updated:  02/03/18 0735     Special Requests: NO SPECIAL REQUESTS        Culture result:         CULTURE IN PROGRESS,FURTHER UPDATES TO FOLLOW    CULTURE, BLOOD [[id number]] Collected:  01/31/18 1616    Order Status:  Completed Specimen:  Blood from Blood Updated:  02/03/18 0720     Special Requests: NO SPECIAL REQUESTS        Culture result: NO GROWTH 3 DAYS       CULTURE, TISSUE Laverta Meuse STAIN [[id number]] Collected:  02/02/18 0915    Order Status:  Completed Specimen:  Knee  Updated:  02/03/18 0123     Special Requests: RT KNEE SYNOVIAL     GRAM STAIN FEW WBC'S         NO ORGANISMS SEEN        Culture result: PENDING    CULTURE, TISSUE W Sulma Torres [[id number]] Collected:  02/02/18 0915    Order Status:  Completed Specimen:  Knee  Updated:  02/03/18 0123     Special Requests: NO SPECIAL REQUESTS        GRAM STAIN FEW WBC'S         NO ORGANISMS SEEN        Culture result: PENDING    CULTURE, Kevin Giron STAIN [[id number]] Collected:  02/02/18 0915    Order Status:  Completed Specimen:  Wound from Knee  Updated:  02/03/18 0123     Special Requests: NO SPECIAL REQUESTS        GRAM STAIN FEW WBC'S         NO ORGANISMS SEEN        Culture result: PENDING    CULTURE, BODY FLUID W Brennen Para [[id number]] Collected:  02/02/18 0915    Order Status:  Completed Specimen:  Joint Fluid Updated:  02/03/18 0123     Special Requests: NO SPECIAL REQUESTS        GRAM STAIN MODERATE WBC'S         NO ORGANISMS SEEN        Culture result: PENDING    CULTURE, URINE [[id number]] Collected:  01/31/18 1705    Order Status:  Completed Specimen:  Urine from Clean catch Updated:  02/02/18 0901     Special Requests: NO SPECIAL REQUESTS        Culture result: NO GROWTH 2 DAYS       INFLUENZA A & B AG (RAPID TEST) [[id number]]  (Abnormal) Collected:  02/01/18 1554    Order Status:  Completed Specimen:  Nasopharyngeal from Nasal washing Updated:  02/01/18 1623     Influenza A Antigen POSITIVE (A)        Influenza B Antigen NEGATIVE              Telemetry: normal sinus rhythm    ECHO 2/1/18   SUMMARY:  Left ventricle: Systolic function was normal by EF (biplane method of disks).  Ejection fraction was estimated to be 60  % in the range of 55 % to 65 %. There were no regional wall motion abnormalities. Wall thickness was mildly increased. There was mild concentric hypertrophy. Doppler parameters were consistent with abnormal left ventricular relaxation  (grade 1 diastolic dysfunction). Right ventricle: The size was normal. Systolic function was normal. Systolic pressure was mildly increased. Estimated  peak pressure was 34 mmHg. Tricuspid valve: There was mild pulmonary hypertension. Inferior vena cava, hepatic veins: The respirophasic change in diameter was less than 50%. INDICATIONS: Syncope.     Imaging:  [x]I have personally reviewed the patients chest radiographs images and report   Knee Xray 2/2/18    Results from Hospital Encounter encounter on 01/31/18   XR CHEST PORT   Narrative EXAM: AP radiograph of the chest    INDICATION: Chest pain    COMPARISON: January 31, 2018, November 16, 2017    _______________    FINDINGS:    Normal heart size and mediastinal contour. No consolidation, pleural effusion,  or pneumothorax. No acute osseous abnormalities. Lower cervical anterior fusion. _______________         Impression IMPRESSION:    No acute pulmonary findings               Results from [place] encounter on 01/31/18   CTA CHEST W OR W WO CONT   Narrative EXAM: CTA Chest    INDICATION: Syncopal episode, altered mental status. Sepsis. Dilation for  possible pulmonary embolism. COMPARISON: Chest radiograph of 1/31/2018. No prior cross-sectional imaging of  the chest is available for review. TECHNIQUE: Axial CT imaging from the thoracic inlet through the diaphragm with  intravenous contrast utilizing CTA study for pulmonary artery evaluation. Coronal and sagittal MIP reformations were generated at a separate workstation. One or more dose reduction techniques were used on this CT: automated exposure  control, adjustment of the mAs and/or kVp according to patient's size, and  iterative reconstruction techniques. The specific techniques utilized on this CT  exam have been documented in the patient's electronic medical record.    _______________    FINDINGS:    EXAM QUALITY: Secondary to a combination of bolus timing and respiratory motion,  the examination is only adequate exclusion of large, central pulmonary embolism. PULMONARY ARTERIES: There is no evidence of large, central pulmonary embolism. The proximal lobar pulmonary arterial branches are patent. Poor pulmonary  arterial opacification as well as respiratory motion artifact results in  inadequate evaluation of the remaining pulmonary arterial tree. The main  pulmonary size is within normal limits. MEDIASTINUM: Cardiac size is normal. Coronary arterial atherosclerotic  calcifications are present. There is no pericardial effusion present. Thoracic  aorta is normal in course and caliber, with scattered atherosclerotic  calcifications demonstrated. LYMPH NODES: There is no supraclavicular, axillary, or mediastinal adenopathy. AIRWAY: Central airways are patent. LUNGS: Evaluation of the lung parenchyma is limited by respiratory motion  artifact. There is no focal pneumonic consolidation demonstrated. PLEURA: No pleural effusion or pneumothorax. UPPER ABDOMEN: Calcified gallstone is demonstrated within a nondistended  gallbladder. A small hiatal hernia is present. .    OTHER: No acute osseous abnormality. Multilevel thoracic spondylosis noted,  with prior posterior decompression involving the lower thoracic spine.    _______________         Impression IMPRESSION:    1. Technically limited examination secondary to combination of contrast bolus  timing and respiratory motion artifact without evidence of large, central  pulmonary embolism. Normal main pulmonary arterial size. 2. No focal pneumonic consolidation. No pleural effusion. 3. Cholelithiasis. 4. Small hiatal hernia. IMPRESSION:   · Principal Problem:  · Infected prosthetic knee joint ([address] (1/31/2018)  · Hypotension  · Acute metabolic encephalopthy-post op state  · Anemia with mild bleeding at surgical site (can't explain the H&H drop), likely dilutional.   ·   · Active Problems:  ·   Diabetes mellitus type 2, controlled ([address] (5/9/2017)  ·   Hyperlipidemia (5/9/2017)  ·   HTN (hypertension) (5/9/2017)  ·   Sepsis ([address] (1/31/2018)  ·   Syncope (1/31/2018)  ·   Influenza (2/2/2018)   RECOMMENDATIONS:   Resp stable; continue nc O2; incent spirometry; titrate FiO2 for goal SPO2> 92%; consider BIPAP if patient develops CHF from fluid resuscitation. CXR 2/3/18 unremarkable. Cardiac-IVF , bolus intermittently for hypotension. Trop unremarkable. LA 2.3, will repeat, doubt sever sepsis. Monitor for any sign of fluid overload.  Second RPBC now for H&H drop which is likely dilutional. No active excessive external bleeding. No abd pain. Frequent H&H check. Stool occult blood. If remains hypotensive, would need to consider Levophed for pressor  EKG done-normal SR; no ST changes noted  Sepsis bundle per hospital protocol  Antibiotic choice: cefepime and iv vancomycin; blood and tissue/wound cx: NGTD  Oral Tamiflu x 5 days for Influenza A positive; droplet precautions  Lactic acid repeat ordered. Glycemic control-poc glucose and SSI  Stress ulcer prophylaxis  DVT prophylaxis-SCD on left leg; low Hb; check US LEs; once Hb stable, would need Ortho clearance to start chemical DVT prophylaxis   Diet - oral ADA  Patient has no siu or central line  Watch IOs    Will defer respective systems problem management to primary and other consultant and follow patient in ICU with primary and other medical team  Further recommendations will be based on the patient's response to recommended treatment and results of the investigation ordered. Quality Care: PPI, DVT prophylaxis, HOB elevated, Infection control all reviewed and addressed. · Skin/Wound: small pressure sore in buttock per nurse  · Nutrition: oral  · Prophylaxis: DVT Prophylaxis and GI Prophylaxis addressed   · Restraints: None  · PT/OT eval and treat: per ortho protocol  · Lines/Tubes: none   ADVANCE DIRECTIVE: Full code  FAMILY DISCUSSION: yes-d.w patient at bedside  Events and notes from last 24 hours reviewed. Care plan discussed with nursing      [x]See my orders for details    My assessment, plan of care, findings, medications, side effects etc were discussed with:  [x]nursing []PT/OT    []respiratory therapy []Dr. Leisa Mahmood [x]Patient     [x]Total critical care time exclusive of procedures 32 minutes with complex decision making performed and > 50% time spent in face to face evaluation.     Ramírez Nguyen MD

## 2018-02-03 NOTE — PROGRESS NOTES
1730 Patient received in room 340 and oriented to the room. 1800 Patient assessed, Healed sacral wound pink in color upon transfer to floor from ICU. Placed wound care consult. Bedside shift change report given to ELLENοroya Mccray RN (oncoming nurse) by Bean Mueller RN (offgoing nurse). Report included the following information SBAR, Kardex and MAR.

## 2018-02-03 NOTE — PROGRESS NOTES
Progress Note      Patient: Kassy Ariza               Sex: male          DOA: 1/31/2018         YOB: 1933      Age:  80 y.o.        LOS:  LOS: 3 days     Procedure: Procedure(s):  EXPLANT EJ PROSTHETIC RIGHT KNEE, SPACER INSERTION, INCISION AND DRAINAGE RIGHT KNEE, PATIENT IS IN ROOM 346            Subjective:     Kassy Ariaz is a 80 y.o. male who is in ICU for pain control and care    Objective:      Visit Vitals    /54    Pulse 83    Temp 98.4 °F (36.9 °C)    Resp 19    Ht 5' 5\" (1.651 m)    Wt 98.2 kg (216 lb 7.9 oz)    SpO2 100%    BMI 36.03 kg/m2       Physical Exam:  A&O x3  calves nontender      Dressing: C/D/I    Intake and Output:  Current Shift:  02/03 0701 - 02/03 1900  In: 300   Out: -   Last three shifts:  02/01 1901 - 02/03 0700  In: 4190 [P.O.:120; I.V.:4070]  Out: 550 [Urine:550]    Drain Output:    Lab/Data Reviewed: All lab results for the last 24 hours reviewed. Medications Reviewed    Continued hospitalization is indicated due to med and PT needs and placement      Assessment/Plan     Principal Problem:    Infected prosthetic knee joint ([facility] [address] [address] (1/31/2018)    Active Problems:    Diabetes mellitus type 2, controlled ([address] (5/9/2017)      Hyperlipidemia (5/9/2017)      HTN (hypertension) (5/9/2017)      Sepsis ([facility] [address] (1/31/2018)      Syncope (1/31/2018)      Influenza (2/2/2018)        Cont current care.   Will transfer to floor when stable    [facility] ([facility]

## 2018-02-03 NOTE — PROGRESS NOTES
1930- Report and care received, assessment completed per flow sheet. Alert, oriented, cooperative, denies pain or SOB. NAD. IVF infusing via IV pump without difficulty. Right leg immobilizer in place, Ace wrap from thigh down to ankle. Serosanguinous drainage noted on anterior portion of ace wrap from top to bottom, ice pack applied to knee. RLE warm, capillary refill WNL, and pedal pulse 2+ palpable. 2030- Spoke with  regarding incisional drainage, states that drainage is to be expected and that dressing may need to be reinforced or changed. 2109- Repeat H/H noted to be 8.4/26. 1. NIBP much improved from earlier.  updated, orders received to discontinue transfusion order. 5352- Reassessment completed and without change except confused to time of day, otherwise oriented. Incontinent of urine, incontinence care completed, linen changed. Right leg drainage only slightly more, remains serosanguinous. 6086- Hypotensive, NIBP checked on both arms. Awake, NAD. Right leg ace wrap without change.  updated, orders received for a stat CBC, normal saline bolus, and for albumin. 80-  updated regarding lower H/H, orders received to transfuse 2 units PRBC.

## 2018-02-03 NOTE — PROGRESS NOTES
Initial assessment completed. AAOx2. No c/o of pain. On n/c @ 2l/m w/out sob. Monitor shows nsrw/out ectopy. Afebrile. Rt knee immobilizer intact & dressigs under w/ bright red blood. Pulses palpable. Second unit of PRBC to be given. 0900- Dr Chrissy Dorsey visited w/ new order. Rt lower leg dressings changed as ordered. When dressings removed moderally oozingover the vaselinized dressings , Appled 4x4 & mepillex then abd, pads x4 then kerlix then brown elastic bandage. , then back the [address]. 1000- PRBC infusing @ 100 cc/hr. VS stable. Monitor no changed    1100- PRBC infused w/out any reaction noted. 1200- Assessment no changed. No c/o of pain. Rt knee dressings remained dry & intact. No sob. Afebeile. Voided w/ 300cc clear hilary urine. Monitor remained nsr w/out ectopy. 1400- Voided in fair amount & 1x incontinent of urine. 65- Dr Cristian Yi visited w/ new order, to transfer to surgical floor. 1600- Assessment no changed. VS stable. Rt leg dressings remained dry & intact. Afebrile. 1615-TRANSFER - OUT REPORT:    Verbal report given to Joe CONNORS(name) on Shana Banda  being transferred to [facility](unit) for routine progression of care       Report consisted of patients Situation, Background, Assessment and   Recommendations(SBAR). Information from the following report(s) SBAR, Kardex, ED Summary, OR Summary, Procedure Summary, Intake/Output, MAR and Recent Results was reviewed with the receiving nurse.     Lines:   Peripheral IV 01/31/18 Left Antecubital (Active)   Site Assessment Clean, dry, & intact 2/3/2018  4:00 AM   Phlebitis Assessment 0 2/3/2018  4:00 AM   Infiltration Assessment 0 2/3/2018  4:00 AM   Dressing Status Clean, dry, & intact 2/3/2018  4:00 AM   Dressing Type Transparent 2/3/2018  4:00 AM   Hub Color/Line Status Capped 2/3/2018  4:00 AM   Action Taken Open ports on tubing capped 2/2/2018  4:00 PM   Alcohol Cap Used Yes 2/2/2018  4:00 PM       Peripheral IV 01/31/18 Left Hand (Active) Site Assessment Clean, dry, & intact 2/2/2018  4:00 PM   Phlebitis Assessment 0 2/2/2018  4:00 PM   Infiltration Assessment 0 2/2/2018  4:00 PM   Dressing Status Clean, dry, & intact 2/2/2018  4:00 PM   Dressing Type Transparent 2/2/2018  4:00 PM   Hub Color/Line Status Infusing 2/2/2018  4:00 PM   Action Taken Open ports on tubing capped 2/2/2018  4:00 PM   Alcohol Cap Used Yes 2/2/2018  4:00 PM       Peripheral IV 02/02/18 Right Hand (Active)   Site Assessment Clean, dry, & intact 2/3/2018  4:00 AM   Phlebitis Assessment 0 2/3/2018  4:00 AM   Infiltration Assessment 0 2/3/2018  4:00 AM   Dressing Status Clean, dry, & intact 2/3/2018  4:00 AM   Dressing Type Transparent 2/3/2018  4:00 AM   Hub Color/Line Status Infusing 2/3/2018  4:00 AM       Peripheral IV 02/02/18 Left Hand (Active)   Site Assessment Clean, dry, & intact 2/3/2018  4:00 AM   Phlebitis Assessment 0 2/3/2018  4:00 AM   Infiltration Assessment 0 2/3/2018  4:00 AM   Dressing Status Clean, dry, & intact 2/3/2018  4:00 AM   Dressing Type Transparent 2/3/2018  4:00 AM   Hub Color/Line Status Capped 2/3/2018  4:00 AM   Action Taken Open ports on tubing capped 2/2/2018  4:00 PM   Alcohol Cap Used Yes 2/2/2018  4:00 PM        Opportunity for questions and clarification was provided.       Patient transported with:   Monitor  Registered Nurse

## 2018-02-03 NOTE — PROGRESS NOTES
Chart reviewed and order acknowledged. Requested RN to release signed and held PT orders. Attempted to see pt in AM however RN requested to hold due to bleeding and receiving a blood transfusion. Attempted again in [id number], RN again requested a hold due to bleeding and just reinforced dressing. Offered minimal mobility, RN continue to request hold and to follow up tomorrow. Will follow up with pt tomorrow for PT evaluation.

## 2018-02-04 LAB
ANION GAP SERPL CALC-SCNC: 6 MMOL/L (ref 3–18)
BASOPHILS # BLD: 0 K/UL (ref 0–0.06)
BASOPHILS NFR BLD: 0 % (ref 0–2)
BUN SERPL-MCNC: 11 MG/DL (ref 7–18)
BUN/CREAT SERPL: 14 (ref 12–20)
CALCIUM SERPL-MCNC: 7.9 MG/DL (ref 8.5–10.1)
CHLORIDE SERPL-SCNC: 109 MMOL/L (ref 100–108)
CO2 SERPL-SCNC: 26 MMOL/L (ref 21–32)
CREAT SERPL-MCNC: 0.78 MG/DL (ref 0.6–1.3)
DIFFERENTIAL METHOD BLD: ABNORMAL
EOSINOPHIL # BLD: 0.1 K/UL (ref 0–0.4)
EOSINOPHIL NFR BLD: 2 % (ref 0–5)
ERYTHROCYTE [DISTWIDTH] IN BLOOD BY AUTOMATED COUNT: 17.8 % (ref 11.6–14.5)
GLUCOSE BLD STRIP.AUTO-MCNC: 103 MG/DL (ref 70–110)
GLUCOSE BLD STRIP.AUTO-MCNC: 153 MG/DL (ref 70–110)
GLUCOSE BLD STRIP.AUTO-MCNC: 185 MG/DL (ref 70–110)
GLUCOSE BLD STRIP.AUTO-MCNC: 98 MG/DL (ref 70–110)
GLUCOSE SERPL-MCNC: 89 MG/DL (ref 74–99)
HCT VFR BLD AUTO: 26.1 % (ref 36–48)
HGB BLD-MCNC: 8.5 G/DL (ref 13–16)
LYMPHOCYTES # BLD: 1.2 K/UL (ref 0.9–3.6)
LYMPHOCYTES NFR BLD: 39 % (ref 21–52)
MCH RBC QN AUTO: 27.5 PG (ref 24–34)
MCHC RBC AUTO-ENTMCNC: 32.6 G/DL (ref 31–37)
MCV RBC AUTO: 84.5 FL (ref 74–97)
MONOCYTES # BLD: 0.4 K/UL (ref 0.05–1.2)
MONOCYTES NFR BLD: 11 % (ref 3–10)
NEUTS SEG # BLD: 1.5 K/UL (ref 1.8–8)
NEUTS SEG NFR BLD: 48 % (ref 40–73)
PLATELET # BLD AUTO: 96 K/UL (ref 135–420)
PMV BLD AUTO: 9.1 FL (ref 9.2–11.8)
POTASSIUM SERPL-SCNC: 3.7 MMOL/L (ref 3.5–5.5)
RBC # BLD AUTO: 3.09 M/UL (ref 4.7–5.5)
SODIUM SERPL-SCNC: 141 MMOL/L (ref 136–145)
WBC # BLD AUTO: 3.1 K/UL (ref 4.6–13.2)

## 2018-02-04 PROCEDURE — 36415 COLL VENOUS BLD VENIPUNCTURE: CPT | Performed by: PHYSICIAN ASSISTANT

## 2018-02-04 PROCEDURE — 82962 GLUCOSE BLOOD TEST: CPT

## 2018-02-04 PROCEDURE — 74011250637 HC RX REV CODE- 250/637: Performed by: INTERNAL MEDICINE

## 2018-02-04 PROCEDURE — 97162 PT EVAL MOD COMPLEX 30 MIN: CPT

## 2018-02-04 PROCEDURE — 74011000250 HC RX REV CODE- 250: Performed by: EMERGENCY MEDICINE

## 2018-02-04 PROCEDURE — 74011250636 HC RX REV CODE- 250/636: Performed by: EMERGENCY MEDICINE

## 2018-02-04 PROCEDURE — 74011250636 HC RX REV CODE- 250/636: Performed by: INTERNAL MEDICINE

## 2018-02-04 PROCEDURE — 80048 BASIC METABOLIC PNL TOTAL CA: CPT | Performed by: PHYSICIAN ASSISTANT

## 2018-02-04 PROCEDURE — 74011250637 HC RX REV CODE- 250/637: Performed by: PHYSICIAN ASSISTANT

## 2018-02-04 PROCEDURE — 85025 COMPLETE CBC W/AUTO DIFF WBC: CPT | Performed by: PHYSICIAN ASSISTANT

## 2018-02-04 PROCEDURE — 65660000000 HC RM CCU STEPDOWN

## 2018-02-04 PROCEDURE — 74011250636 HC RX REV CODE- 250/636: Performed by: PHYSICIAN ASSISTANT

## 2018-02-04 PROCEDURE — 74011250637 HC RX REV CODE- 250/637: Performed by: HOSPITALIST

## 2018-02-04 PROCEDURE — 77010033678 HC OXYGEN DAILY

## 2018-02-04 PROCEDURE — 74011636637 HC RX REV CODE- 636/637: Performed by: INTERNAL MEDICINE

## 2018-02-04 PROCEDURE — 97530 THERAPEUTIC ACTIVITIES: CPT

## 2018-02-04 RX ORDER — SODIUM CHLORIDE 0.9 % (FLUSH) 0.9 %
5-10 SYRINGE (ML) INJECTION EVERY 8 HOURS
Status: DISCONTINUED | OUTPATIENT
Start: 2018-02-04 | End: 2018-02-07 | Stop reason: HOSPADM

## 2018-02-04 RX ORDER — ENOXAPARIN SODIUM 100 MG/ML
40 INJECTION SUBCUTANEOUS DAILY
Status: DISCONTINUED | OUTPATIENT
Start: 2018-02-04 | End: 2018-02-04

## 2018-02-04 RX ORDER — SODIUM CHLORIDE 0.9 % (FLUSH) 0.9 %
5-10 SYRINGE (ML) INJECTION AS NEEDED
Status: DISCONTINUED | OUTPATIENT
Start: 2018-02-04 | End: 2018-02-07 | Stop reason: HOSPADM

## 2018-02-04 RX ORDER — ENOXAPARIN SODIUM 100 MG/ML
40 INJECTION SUBCUTANEOUS DAILY
Status: DISCONTINUED | OUTPATIENT
Start: 2018-02-04 | End: 2018-02-07 | Stop reason: HOSPADM

## 2018-02-04 RX ORDER — LANOLIN ALCOHOL/MO/W.PET/CERES
1 CREAM (GRAM) TOPICAL 2 TIMES DAILY WITH MEALS
Status: DISCONTINUED | OUTPATIENT
Start: 2018-02-04 | End: 2018-02-07 | Stop reason: HOSPADM

## 2018-02-04 RX ORDER — DOCUSATE SODIUM 100 MG/1
100 CAPSULE, LIQUID FILLED ORAL 2 TIMES DAILY
Status: DISCONTINUED | OUTPATIENT
Start: 2018-02-04 | End: 2018-02-07 | Stop reason: HOSPADM

## 2018-02-04 RX ORDER — NALOXONE HYDROCHLORIDE 0.4 MG/ML
0.4 INJECTION, SOLUTION INTRAMUSCULAR; INTRAVENOUS; SUBCUTANEOUS AS NEEDED
Status: DISCONTINUED | OUTPATIENT
Start: 2018-02-04 | End: 2018-02-07 | Stop reason: HOSPADM

## 2018-02-04 RX ORDER — HYDROMORPHONE HYDROCHLORIDE 1 MG/ML
1 INJECTION, SOLUTION INTRAMUSCULAR; INTRAVENOUS; SUBCUTANEOUS
Status: DISCONTINUED | OUTPATIENT
Start: 2018-02-04 | End: 2018-02-07 | Stop reason: HOSPADM

## 2018-02-04 RX ORDER — HYDROCODONE BITARTRATE AND ACETAMINOPHEN 5; 325 MG/1; MG/1
1-2 TABLET ORAL
Status: DISCONTINUED | OUTPATIENT
Start: 2018-02-04 | End: 2018-02-05

## 2018-02-04 RX ORDER — ONDANSETRON 2 MG/ML
4 INJECTION INTRAMUSCULAR; INTRAVENOUS
Status: DISCONTINUED | OUTPATIENT
Start: 2018-02-04 | End: 2018-02-07 | Stop reason: HOSPADM

## 2018-02-04 RX ADMIN — Medication 10 ML: at 15:34

## 2018-02-04 RX ADMIN — Medication 10 ML: at 05:51

## 2018-02-04 RX ADMIN — SODIUM CHLORIDE 1000 MG: 900 INJECTION, SOLUTION INTRAVENOUS at 17:20

## 2018-02-04 RX ADMIN — SODIUM CHLORIDE 100 ML/HR: 900 INJECTION, SOLUTION INTRAVENOUS at 05:30

## 2018-02-04 RX ADMIN — TRAZODONE HYDROCHLORIDE 50 MG: 50 TABLET ORAL at 21:43

## 2018-02-04 RX ADMIN — SODIUM CHLORIDE 1000 MG: 900 INJECTION, SOLUTION INTRAVENOUS at 05:30

## 2018-02-04 RX ADMIN — TRAMADOL HYDROCHLORIDE 50 MG: 50 TABLET, FILM COATED ORAL at 05:51

## 2018-02-04 RX ADMIN — INSULIN LISPRO 2 UNITS: 100 INJECTION, SOLUTION INTRAVENOUS; SUBCUTANEOUS at 12:46

## 2018-02-04 RX ADMIN — BETHANECHOL CHLORIDE 25 MG: 10 TABLET ORAL at 17:21

## 2018-02-04 RX ADMIN — TRAMADOL HYDROCHLORIDE 50 MG: 50 TABLET, FILM COATED ORAL at 15:34

## 2018-02-04 RX ADMIN — ENOXAPARIN SODIUM 40 MG: 40 INJECTION SUBCUTANEOUS at 01:34

## 2018-02-04 RX ADMIN — DOCUSATE SODIUM 100 MG: 100 CAPSULE, LIQUID FILLED ORAL at 08:13

## 2018-02-04 RX ADMIN — FAMOTIDINE 20 MG: 20 TABLET, FILM COATED ORAL at 21:43

## 2018-02-04 RX ADMIN — BETHANECHOL CHLORIDE 25 MG: 10 TABLET ORAL at 08:14

## 2018-02-04 RX ADMIN — WATER 2 G: 1 INJECTION INTRAMUSCULAR; INTRAVENOUS; SUBCUTANEOUS at 12:45

## 2018-02-04 RX ADMIN — INSULIN LISPRO 2 UNITS: 100 INJECTION, SOLUTION INTRAVENOUS; SUBCUTANEOUS at 17:22

## 2018-02-04 RX ADMIN — FERROUS SULFATE TAB 325 MG (65 MG ELEMENTAL FE) 325 MG: 325 (65 FE) TAB at 17:20

## 2018-02-04 RX ADMIN — ATORVASTATIN CALCIUM 20 MG: 20 TABLET, FILM COATED ORAL at 08:11

## 2018-02-04 RX ADMIN — OSELTAMIVIR PHOSPHATE 75 MG: 75 CAPSULE ORAL at 21:43

## 2018-02-04 RX ADMIN — FERROUS SULFATE TAB 325 MG (65 MG ELEMENTAL FE) 325 MG: 325 (65 FE) TAB at 08:13

## 2018-02-04 RX ADMIN — FAMOTIDINE 20 MG: 20 TABLET, FILM COATED ORAL at 08:11

## 2018-02-04 RX ADMIN — TRAMADOL HYDROCHLORIDE 50 MG: 50 TABLET, FILM COATED ORAL at 21:43

## 2018-02-04 RX ADMIN — ALFUZOSIN HYDROCHLORIDE 10 MG: 10 TABLET ORAL at 08:12

## 2018-02-04 RX ADMIN — OSELTAMIVIR PHOSPHATE 75 MG: 75 CAPSULE ORAL at 08:10

## 2018-02-04 NOTE — PROGRESS NOTES
Hospitalist Progress Note    Patient: Christina Walker MRN: [id number]  CSN: [id number]    YOB: 1933  Age: 80 y.o. Sex: male    DOA: 1/31/2018 LOS:  LOS: 4 days          Chief Complaint:  Post op joint infection          Assessment/Plan     Disposition :  Patient Active Problem List   Diagnosis Code    Diabetes mellitus type 2, controlled ([facility] E11.9    Hyperlipidemia E78.5    HTN (hypertension) I10    Rupture of right quadriceps tendon S76.111A    Sepsis ([facility] A41.9    Infected prosthetic knee joint ([facility] [address] T84.59XA, Z96.659    Syncope R55    Influenza J11.1     1. Sepsis  2. S/P Washout for Infected prosthetic Joint  3. Syncope  4. HTN  5. Dyslipidemia  6. Influenza A  7. Post-Anesthesia Delirium     1. Continue Cefepime and vancomycin. Dr Antony Adamson following. Continue IVF. Continue to follow cultures and streamline abx per Dr Antony Adamson. No growth so far on cultures reviewed today. 2. Continue IV abx and IVF as described above. 3. Workup has been completed, no cardiac or pulmonary etiology for syncopal episode. Likely either secondary to infected prosthetic or influenza. 4. Continue current regimen, BP stable. 5. Continue Lipitor. 6. Patient tested positive for Influenza A. Continue Tamiflu and fluids and monitor. 7. Clinically stable for transfer to [facility]     DVT Prophylaxis - SCDs  Dispo: PT recs rehab; care management to engage on Monday     Subjective:    No events over night. Patient doing well this AM.  No new complaints.     Review of systems:    Constitutional: denies fevers, chills, myalgias  Respiratory: slight SOB, mild cough  Cardiovascular: denies chest pain, palpitations  Gastrointestinal: denies nausea, vomiting, diarrhea      Vital signs/Intake and Output:  Visit Vitals    /50    Pulse 66    Temp 98.1 °F (36.7 °C)    Resp 20    Ht 5' 5\" (1.651 m)    Wt 98.5 kg (217 lb 2.5 oz)    SpO2 100%    BMI 36.14 kg/m2     Current Shift:  02/04 0701 - 02/04 1900  In: -   Out: 200 [Urine:200]  Last three shifts:  02/02 1901 - 02/04 0700  In: 5040 [P.O.:385; I.V.:3755]  Out: 850 [Urine:850]    Exam:    General: Well developed, alert, NAD, OX3  Head/Neck: NCAT, supple, No masses, No lymphadenopathy  CVS:Regular rate and rhythm, no M/R/G, S1/S2 heard, no thrill  Lungs:Clear to auscultation bilaterally, no wheezes, rhonchi, or rales  Abdomen: Soft, Nontender, No distention, Normal Bowel sounds, No hepatomegaly  Extremities: No C/C/E, pulses palpable 2+  Skin:normal texture and turgor, no rashes, no lesions  Neuro:grossly normal , follows commands  Psych:appropriate                Labs: Results:       Chemistry Recent Labs      02/04/18   0334  02/03/18   0310  02/02/18   1620   GLU  89  143*  106*   NA  141  136  136   K  3.7  3.7  3.5   CL  109*  105  103   CO2  26  24  27   BUN  11  16  15   CREA  0.78  0.92  1.04   CA  7.9*  7.0*  7.7*   AGAP  6  7  6   BUCR  14  17  14      CBC w/Diff Recent Labs      02/04/18   0334  02/03/18   1400  02/03/18   0310 02/02/18   1620   WBC  3.1*   --   3.3*   --   2.4*   RBC  3.09*   --   2.41*   --   2.79*   HGB  8.5*  8.8*  6.5*   < >  7.7*   HCT  26.1*  26.5*  20.5*   < >  23.9*   PLT  96*   --   100*   --   107*   GRANS  48   --   66   --   62   LYMPH  39   --   19*   --   28   EOS  2   --   0   --   0    < > = values in this interval not displayed. Cardiac Enzymes Recent Labs      02/03/18   0310 02/02/18   2300   CPK  116  133   CKND1  2.0  2.1      Coagulation No results for input(s): PTP, INR, APTT in the last 72 hours. No lab exists for component: INREXT, INREXT    Lipid Panel No results found for: CHOL, CHOLPOCT, CHOLX, CHLST, CHOLV, 164554, HDL, LDL, LDLC, DLDLP, 969285, VLDLC, VLDL, TGLX, TRIGL, TRIGP, TGLPOCT, CHHD, CHHDX   BNP No results for input(s): BNPP in the last 72 hours. Liver Enzymes No results for input(s): TP, ALB, TBIL, AP, SGOT, GPT in the last 72 hours.     No lab exists for component: DBIL   Thyroid Studies No results found for: T4, T3U, TSH, TSHEXT, TSHEXT     Procedures/imaging: see electronic medical records for all procedures/Xrays and details which were not copied into this note but were reviewed prior to creation of Nelly Young MD

## 2018-02-04 NOTE — PROGRESS NOTES
0730 Assumed responsibility for patient from [address] [place] [address]    0507 Morning medications and assessment administered    1345 Patient had stool incontinence episode. Patient cleaned and pads replaced. 1536 Patient given pain meds following therapy    1730 Evening medications given and incontinence care provided    Bedside shift change report given to Ly Bond RN (oncoming nurse) by Akanksha Sandra RN (offgoing nurse). Report included the following information SBAR, Kardex and MAR.

## 2018-02-04 NOTE — PROGRESS NOTES
Problem: Mobility Impaired (Adult and Pediatric)  Goal: *Acute Goals and Plan of Care (Insert Text)  Physical Therapy Goals  Initiated 2/4/2018 and to be accomplished within 3-7 day(s)  1. Patient will move from supine to sit and sit to supine  in bed with supervision/set-up. 2.  Patient will transfer from bed to chair and chair to bed with supervision/set-up using the least restrictive device. 3.  Patient will perform sit to stand with supervision/set-up. 4.  Patient will ambulate with minimal assistance/contact guard assist for 50 feet with the least restrictive device. Outcome: Progressing Towards Goal  physical Therapy TREATMENT    Patient: Cassi Lancaster (56 y.o. male)  Date: 2/4/2018  Diagnosis: Sepsis ([address]  COMPLICATION FROM PROSTHETIC DEVICE RIGHT KNEE Infected prosthetic knee joint ([facility])  Procedure(s) (LRB):  EXPLANT EJ PROSTHETIC RIGHT KNEE, SPACER INSERTION, INCISION AND DRAINAGE RIGHT KNEE, PATIENT IS IN ROOM 346 (Right) 2 Days Post-Op  Precautions: Fall, PWB (50% R LE)   Chart, physical therapy assessment, plan of care and goals were reviewed. ASSESSMENT:  Pt motivated and agreeable to participate with PT. Pt moved to EOB with ModA. Once sitting EOB pt maintain good sitting balance with supervision and UE support. From elevated bed, pt able to move to stand with MaxAx1-2 for lift and balance, ad[facility] to [facility] precautions, c/KI applied. On first stand pt able to tolerate standing for 1-2 minutes and able to perform R LE semi-marches with ModA for balance. Pt returned to sitting and after short sitting rest able to tolerate another standing trial with same assistance level. Only able to tolerate 10 seconds this attempt due to fatigue and pain. Pt returned to supine with Mod-MaxA for LE and trunk movement. Pt left supine in bed with all needs met and call bell within reach. RN notified of pt performance.   Progression toward goals:  []      Improving appropriately and progressing toward goals  [x]      Improving slowly and progressing toward goals  []      Not making progress toward goals and plan of care will be adjusted     PLAN:  Patient continues to benefit from skilled intervention to address the above impairments. Continue treatment per established plan of care. Discharge Recommendations:  Rehab  Further Equipment Recommendations for Discharge:  TBD     SUBJECTIVE:   Patient stated Ok we can try again.     OBJECTIVE DATA SUMMARY:   Critical Behavior:  Neurologic State: Alert  Orientation Level: Oriented X4  Cognition: Follows commands  Functional Mobility Training:  Bed Mobility:  Supine to Sit: Moderate assistance  Sit to Supine: Moderate assistance  Transfers:  Sit to Stand: Maximum assistance;Assist x1;Assist x2  Stand to Sit: Maximum assistance  Balance:  Sitting: Intact; With support  Standing: Impaired; With support  Standing - Static: Poor  Standing - Dynamic : Poor  Pain:  Pain Scale 1: Numeric (0 - 10)  Pain Intensity 1: 4  Pain Location 1: Knee  Pain Orientation 1: Right  Activity Tolerance:   Fair  Please refer to the flowsheet for vital signs taken during this treatment.   After treatment:   [] Patient left in no apparent distress sitting up in chair  [x] Patient left in no apparent distress in bed  [x] Call bell left within reach  [x] Nursing notified  [] Caregiver present  [] Bed alarm activated      Georgiae Aristeo Titcomb   Time Calculation: 23 mins

## 2018-02-04 NOTE — PROGRESS NOTES
Problem: Mobility Impaired (Adult and Pediatric)  Goal: *Acute Goals and Plan of Care (Insert Text)  Physical Therapy Goals  Initiated 2/4/2018 and to be accomplished within 3-7 day(s)  1. Patient will move from supine to sit and sit to supine  in bed with supervision/set-up. 2.  Patient will transfer from bed to chair and chair to bed with supervision/set-up using the least restrictive device. 3.  Patient will perform sit to stand with supervision/set-up. 4.  Patient will ambulate with minimal assistance/contact guard assist for 50 feet with the least restrictive device. Outcome: Progressing Towards Goal  physical Therapy EVALUATION    Patient: Magda Weiss (36 y.o. male)  Date: 2/4/2018  Primary Diagnosis: Sepsis ([address]  COMPLICATION FROM PROSTHETIC DEVICE RIGHT KNEE  Procedure(s) (LRB):  EXPLANT EJ PROSTHETIC RIGHT KNEE, SPACER INSERTION, INCISION AND DRAINAGE RIGHT KNEE, PATIENT IS IN ROOM 346 (Right) 2 Days Post-Op   Precautions:   Fall, PWB (50% R LE)    ASSESSMENT :  Based on the objective data described below, the patient presents with lower extremity weakness, impaired bed mobility and transfers, and overall limitations in functional mobility following surgical intervention as noted above. Pt underwent original knee replacement 5 years ago and quad tendon repair in 2017 with most recent need for I&D and placement of spacer. Pt performed supine to sit with ModA for LE movement, sit to stand with MaxA for lift and balance and to maintain PWB restrictions on R LE. Pt able to perform 3 standing attempts. Unable to tolerate steps at this time. Pt returned to supine with [address] for LE movement. Pt rolled R/L for bed pan placement and removal, requiring Lashay for rolling. Pt reported minimally increased pain with standing. Patient would benefit from skilled inpatient physical therapy to address deficits, progress as tolerated to achieve long term goals and allow safe discharge.  Recommend rehab placement upon d/c for continued strengthening, endurance training, gait and stair training to allow eventual safe d/c home. Patient will benefit from skilled intervention to address the above impairments. Patients rehabilitation potential is considered to be Good  Factors which may influence rehabilitation potential include:   []         None noted  []         Mental ability/status  [x]         Medical condition  [x]         Home/family situation and support systems  [x]         Safety awareness  [x]         Pain tolerance/management  []         Other:      PLAN :  Recommendations and Planned Interventions:  [x]           Bed Mobility Training             [x]    Neuromuscular Re-Education  [x]           Transfer Training                   []    Orthotic/Prosthetic Training  [x]           Gait Training                          []    Modalities  [x]           Therapeutic Exercises          []    Edema Management/Control  [x]           Therapeutic Activities            [x]    Patient and Family Training/Education  []           Other (comment):    Frequency/Duration: Patient will be followed by physical therapy 1-2 times per day to address goals. Discharge Recommendations: Rehab  Further Equipment Recommendations for Discharge: N/A     SUBJECTIVE:   Patient stated This is first time I've stood up for 2 weeks.     OBJECTIVE DATA SUMMARY:     Past Medical History:   Diagnosis Date    Arthritis     Coagulation disorder ([facility]     Lovenox    Diabetes ([facility] 2005    diet controlled, on insulin at this time (1/31/18)    Hypercholesteremia     Hypertension 2005    Sleep apnea 2008    no treatment    Urinary urgency      Past Surgical History:   Procedure Laterality Date    HX APPENDECTOMY      HX BACK SURGERY  11/2017    X 3-last surgery 11/2017    HX BACK SURGERY  1990's    cervcal neck    HX CATARACT REMOVAL      bilateral    HX ORTHOPAEDIC  2013    right knee replacement    HX ORTHOPAEDIC  05/2017    ligament repair    HX VASECTOMY       Barriers to Learning/Limitations: None  Compensate with: N/A  Prior Level of Function/Home Situation: Independent amb c/RW  Home Situation  Home Environment: Private residence  # Steps to Enter: 4  Rails to Enter: Yes  Hand Rails : Bilateral  One/Two Story Residence: Two story, live on 1st floor  Living Alone: No  Support Systems: Spouse/Significant Other/Partner, Child(isabel)  Patient Expects to be Discharged to[de-identified] Rehabilitation facility  Current DME Used/Available at Home: Walker, rolling  Critical Behavior:  Neurologic State: Alert  Orientation Level: Oriented X4  Cognition: Follows commands  Psychosocial  Patient Behaviors: Calm  Purposeful Interaction: Yes  Pt Identified Daily Priority: Clinical issues (comment)  Caritas Process: Establish trust  Caring Interventions: Reassure  Reassure: Informing  Skin Condition/Temp: Warm;Dry  Skin Integrity: Intact  Skin Integumentary  Skin Color: Appropriate for ethnicity  Skin Condition/Temp: Warm;Dry  Skin Integrity: Intact  Turgor: Non-tenting  Hair Growth: Present  Varicosities: Absent   Strength:    Strength: Generally decreased, functional  Tone & Sensation:   Tone: Normal  Sensation: Impaired  Range Of Motion:  AROM: Generally decreased, functional  PROM: Generally decreased, functional  Functional Mobility:  Bed Mobility:  Supine to Sit: Moderate assistance  Sit to Supine: Moderate assistance  Transfers:  Sit to Stand: Maximum assistance  Stand to Sit: Maximum assistance  Balance:   Sitting: Intact  Standing: Impaired; With support  Standing - Static: Poor  Standing - Dynamic : Poor  Pain:  Pain Scale 1: Numeric (0 - 10)  Pain Intensity 1: 4  Pain Location 1: Knee  Pain Orientation 1: Right  Activity Tolerance:   Fair  Please refer to the flowsheet for vital signs taken during this treatment.   After treatment:   []         Patient left in no apparent distress sitting up in chair  [x]         Patient left in no apparent distress in bed  [x] Call bell left within reach  [x]         Nursing notified  []         Caregiver present  []         Bed alarm activated    COMMUNICATION/EDUCATION:   [x]         Fall prevention education was provided and the patient/caregiver indicated understanding. [x]         Patient/family have participated as able in goal setting and plan of care. [x]         Patient/family agree to work toward stated goals and plan of care. []         Patient understands intent and goals of therapy, but is neutral about his/her participation. []         Patient is unable to participate in goal setting and plan of care. Thank you for this referral.  Arvin Trejo   Time Calculation: 64 mins   Eval Complexity: History: MEDIUM  Complexity : 1-2 comorbidities / personal factors will impact the outcome/ POC Exam:MEDIUM Complexity : 3 Standardized tests and measures addressing body structure, function, activity limitation and / or participation in recreation  Presentation: MEDIUM Complexity : Evolving with changing characteristics  Clinical Decision Making:Medium Complexity amb <30 ft, requires Mod/Max for bed mobiltiy and transfers Overall Complexity:MEDIUM Mobility  Current  CK= 40-59%   Goal  CI= 1-19%. The severity rating is based on the Level of Assistance required for Functional Mobility and ADLs.

## 2018-02-04 NOTE — PROGRESS NOTES
Bedside and Verbal shift change report given to Yazan Herrera (oncoming nurse) by Sandi Mendoza (offgoing nurse). Report included the following information SBAR, Procedure Summary, Intake/Output, Accordion, Recent Results, Med Rec Status and Cardiac Rhythm NSR.

## 2018-02-05 ENCOUNTER — APPOINTMENT (OUTPATIENT)
Dept: INTERVENTIONAL RADIOLOGY/VASCULAR | Age: 83
DRG: 463 | End: 2018-02-05
Attending: ORTHOPAEDIC SURGERY
Payer: MEDICARE

## 2018-02-05 LAB
ANION GAP SERPL CALC-SCNC: 4 MMOL/L (ref 3–18)
BASOPHILS # BLD: 0 K/UL (ref 0–0.06)
BASOPHILS NFR BLD: 0 % (ref 0–2)
BUN SERPL-MCNC: 8 MG/DL (ref 7–18)
BUN/CREAT SERPL: 10 (ref 12–20)
CALCIUM SERPL-MCNC: 7.6 MG/DL (ref 8.5–10.1)
CHLORIDE SERPL-SCNC: 108 MMOL/L (ref 100–108)
CO2 SERPL-SCNC: 29 MMOL/L (ref 21–32)
CREAT SERPL-MCNC: 0.8 MG/DL (ref 0.6–1.3)
DIFFERENTIAL METHOD BLD: ABNORMAL
EOSINOPHIL # BLD: 0 K/UL (ref 0–0.4)
EOSINOPHIL NFR BLD: 1 % (ref 0–5)
ERYTHROCYTE [DISTWIDTH] IN BLOOD BY AUTOMATED COUNT: 17.4 % (ref 11.6–14.5)
GLUCOSE BLD STRIP.AUTO-MCNC: 117 MG/DL (ref 70–110)
GLUCOSE BLD STRIP.AUTO-MCNC: 150 MG/DL (ref 70–110)
GLUCOSE BLD STRIP.AUTO-MCNC: 88 MG/DL (ref 70–110)
GLUCOSE BLD STRIP.AUTO-MCNC: 92 MG/DL (ref 70–110)
GLUCOSE SERPL-MCNC: 83 MG/DL (ref 74–99)
HCT VFR BLD AUTO: 23.9 % (ref 36–48)
HGB BLD-MCNC: 7.9 G/DL (ref 13–16)
LYMPHOCYTES # BLD: 1.2 K/UL (ref 0.9–3.6)
LYMPHOCYTES NFR BLD: 36 % (ref 21–52)
MCH RBC QN AUTO: 27.8 PG (ref 24–34)
MCHC RBC AUTO-ENTMCNC: 33.1 G/DL (ref 31–37)
MCV RBC AUTO: 84.2 FL (ref 74–97)
MONOCYTES # BLD: 0.4 K/UL (ref 0.05–1.2)
MONOCYTES NFR BLD: 12 % (ref 3–10)
NEUTS SEG # BLD: 1.8 K/UL (ref 1.8–8)
NEUTS SEG NFR BLD: 51 % (ref 40–73)
PLATELET # BLD AUTO: 75 K/UL (ref 135–420)
PMV BLD AUTO: 8.9 FL (ref 9.2–11.8)
POTASSIUM SERPL-SCNC: 3.4 MMOL/L (ref 3.5–5.5)
RBC # BLD AUTO: 2.84 M/UL (ref 4.7–5.5)
SODIUM SERPL-SCNC: 141 MMOL/L (ref 136–145)
WBC # BLD AUTO: 3.4 K/UL (ref 4.6–13.2)

## 2018-02-05 PROCEDURE — 77010033678 HC OXYGEN DAILY

## 2018-02-05 PROCEDURE — 85025 COMPLETE CBC W/AUTO DIFF WBC: CPT | Performed by: PHYSICIAN ASSISTANT

## 2018-02-05 PROCEDURE — 74011000250 HC RX REV CODE- 250: Performed by: EMERGENCY MEDICINE

## 2018-02-05 PROCEDURE — 74011250637 HC RX REV CODE- 250/637: Performed by: INTERNAL MEDICINE

## 2018-02-05 PROCEDURE — 02HV33Z INSERTION OF INFUSION DEVICE INTO SUPERIOR VENA CAVA, PERCUTANEOUS APPROACH: ICD-10-PCS | Performed by: RADIOLOGY

## 2018-02-05 PROCEDURE — 74011250637 HC RX REV CODE- 250/637: Performed by: PHYSICIAN ASSISTANT

## 2018-02-05 PROCEDURE — 80048 BASIC METABOLIC PNL TOTAL CA: CPT | Performed by: PHYSICIAN ASSISTANT

## 2018-02-05 PROCEDURE — 74011250637 HC RX REV CODE- 250/637: Performed by: HOSPITALIST

## 2018-02-05 PROCEDURE — 97116 GAIT TRAINING THERAPY: CPT

## 2018-02-05 PROCEDURE — 74011250636 HC RX REV CODE- 250/636: Performed by: RADIOLOGY

## 2018-02-05 PROCEDURE — 76937 US GUIDE VASCULAR ACCESS: CPT

## 2018-02-05 PROCEDURE — 74011250636 HC RX REV CODE- 250/636: Performed by: EMERGENCY MEDICINE

## 2018-02-05 PROCEDURE — 74011000250 HC RX REV CODE- 250: Performed by: RADIOLOGY

## 2018-02-05 PROCEDURE — 74011636637 HC RX REV CODE- 636/637: Performed by: INTERNAL MEDICINE

## 2018-02-05 PROCEDURE — 82962 GLUCOSE BLOOD TEST: CPT

## 2018-02-05 PROCEDURE — 65660000000 HC RM CCU STEPDOWN

## 2018-02-05 PROCEDURE — 36415 COLL VENOUS BLD VENIPUNCTURE: CPT | Performed by: PHYSICIAN ASSISTANT

## 2018-02-05 PROCEDURE — 74011250636 HC RX REV CODE- 250/636: Performed by: INTERNAL MEDICINE

## 2018-02-05 PROCEDURE — 97530 THERAPEUTIC ACTIVITIES: CPT

## 2018-02-05 PROCEDURE — 97167 OT EVAL HIGH COMPLEX 60 MIN: CPT

## 2018-02-05 RX ORDER — POTASSIUM CHLORIDE 20 MEQ/1
40 TABLET, EXTENDED RELEASE ORAL
Status: COMPLETED | OUTPATIENT
Start: 2018-02-05 | End: 2018-02-05

## 2018-02-05 RX ORDER — LANOLIN ALCOHOL/MO/W.PET/CERES
325 CREAM (GRAM) TOPICAL 2 TIMES DAILY WITH MEALS
Qty: 60 TAB | Refills: 0 | Status: SHIPPED | OUTPATIENT
Start: 2018-02-05

## 2018-02-05 RX ORDER — TRAMADOL HYDROCHLORIDE 50 MG/1
50 TABLET ORAL
Qty: 54 TAB | Refills: 0 | Status: SHIPPED | OUTPATIENT
Start: 2018-02-05

## 2018-02-05 RX ORDER — ENOXAPARIN SODIUM 100 MG/ML
40 INJECTION SUBCUTANEOUS DAILY
Qty: 21 SYRINGE | Refills: 0 | Status: SHIPPED
Start: 2018-02-05

## 2018-02-05 RX ORDER — HEPARIN SODIUM 200 [USP'U]/100ML
500 INJECTION, SOLUTION INTRAVENOUS
Status: COMPLETED | OUTPATIENT
Start: 2018-02-05 | End: 2018-02-05

## 2018-02-05 RX ORDER — LIDOCAINE HYDROCHLORIDE 10 MG/ML
1-20 INJECTION INFILTRATION; PERINEURAL
Status: COMPLETED | OUTPATIENT
Start: 2018-02-05 | End: 2018-02-05

## 2018-02-05 RX ORDER — DOCUSATE SODIUM 100 MG/1
100 CAPSULE, LIQUID FILLED ORAL 2 TIMES DAILY
Qty: 60 CAP | Refills: 0 | Status: SHIPPED | OUTPATIENT
Start: 2018-02-05 | End: 2018-03-07

## 2018-02-05 RX ORDER — HEPARIN SODIUM (PORCINE) LOCK FLUSH IV SOLN 100 UNIT/ML 100 UNIT/ML
500 SOLUTION INTRAVENOUS
Status: COMPLETED | OUTPATIENT
Start: 2018-02-05 | End: 2018-02-05

## 2018-02-05 RX ADMIN — WATER 2 G: 1 INJECTION INTRAMUSCULAR; INTRAVENOUS; SUBCUTANEOUS at 00:12

## 2018-02-05 RX ADMIN — DOCUSATE SODIUM 100 MG: 100 CAPSULE, LIQUID FILLED ORAL at 10:34

## 2018-02-05 RX ADMIN — SODIUM CHLORIDE 100 ML/HR: 900 INJECTION, SOLUTION INTRAVENOUS at 05:49

## 2018-02-05 RX ADMIN — BETHANECHOL CHLORIDE 25 MG: 10 TABLET ORAL at 22:33

## 2018-02-05 RX ADMIN — WATER 2 G: 1 INJECTION INTRAMUSCULAR; INTRAVENOUS; SUBCUTANEOUS at 13:34

## 2018-02-05 RX ADMIN — Medication 10 ML: at 15:23

## 2018-02-05 RX ADMIN — FERROUS SULFATE TAB 325 MG (65 MG ELEMENTAL FE) 325 MG: 325 (65 FE) TAB at 19:00

## 2018-02-05 RX ADMIN — DOCUSATE SODIUM 100 MG: 100 CAPSULE, LIQUID FILLED ORAL at 22:26

## 2018-02-05 RX ADMIN — TRAZODONE HYDROCHLORIDE 50 MG: 50 TABLET ORAL at 22:26

## 2018-02-05 RX ADMIN — SODIUM CHLORIDE 1000 MG: 900 INJECTION, SOLUTION INTRAVENOUS at 18:59

## 2018-02-05 RX ADMIN — ATORVASTATIN CALCIUM 20 MG: 20 TABLET, FILM COATED ORAL at 10:35

## 2018-02-05 RX ADMIN — TRAMADOL HYDROCHLORIDE 50 MG: 50 TABLET, FILM COATED ORAL at 05:50

## 2018-02-05 RX ADMIN — LIDOCAINE HYDROCHLORIDE 3 ML: 10 INJECTION, SOLUTION INFILTRATION; PERINEURAL at 08:48

## 2018-02-05 RX ADMIN — HEPARIN SODIUM (PORCINE) LOCK FLUSH IV SOLN 100 UNIT/ML 500 UNITS: 100 SOLUTION at 08:49

## 2018-02-05 RX ADMIN — SODIUM CHLORIDE 1000 MG: 900 INJECTION, SOLUTION INTRAVENOUS at 05:49

## 2018-02-05 RX ADMIN — POTASSIUM CHLORIDE 40 MEQ: 20 TABLET, EXTENDED RELEASE ORAL at 13:33

## 2018-02-05 RX ADMIN — FERROUS SULFATE TAB 325 MG (65 MG ELEMENTAL FE) 325 MG: 325 (65 FE) TAB at 10:34

## 2018-02-05 RX ADMIN — ALFUZOSIN HYDROCHLORIDE 10 MG: 10 TABLET ORAL at 10:34

## 2018-02-05 RX ADMIN — INSULIN LISPRO 2 UNITS: 100 INJECTION, SOLUTION INTRAVENOUS; SUBCUTANEOUS at 17:00

## 2018-02-05 RX ADMIN — OSELTAMIVIR PHOSPHATE 75 MG: 75 CAPSULE ORAL at 10:35

## 2018-02-05 RX ADMIN — OSELTAMIVIR PHOSPHATE 75 MG: 75 CAPSULE ORAL at 22:26

## 2018-02-05 RX ADMIN — Medication 1000 UNITS: at 08:49

## 2018-02-05 RX ADMIN — BETHANECHOL CHLORIDE 25 MG: 10 TABLET ORAL at 15:22

## 2018-02-05 RX ADMIN — FAMOTIDINE 20 MG: 20 TABLET, FILM COATED ORAL at 22:26

## 2018-02-05 RX ADMIN — BETHANECHOL CHLORIDE 25 MG: 10 TABLET ORAL at 05:50

## 2018-02-05 RX ADMIN — FAMOTIDINE 20 MG: 20 TABLET, FILM COATED ORAL at 10:35

## 2018-02-05 NOTE — DISCHARGE INSTRUCTIONS
Sepsis: Care Instructions  Your Care Instructions    Sepsis is an infection that has spread throughout your body. It is a life-threatening condition and often causes extremely low blood pressure. This can lead to problems with many different organs. The cause of sepsis is not always clear, but it can happen as part of a long-term or sudden illness. Sometimes even a mild illness can lead to sepsis. Follow-up care is a key part of your treatment and safety. Be sure to make and go to all appointments, and call your doctor if you are having problems. It's also a good idea to know your test results and keep a list of the medicines you take. How can you care for yourself at home? · If your doctor prescribed antibiotics, take them as directed. Do not stop taking them just because you feel better. You need to take the full course of antibiotics. · Drink plenty of fluids, enough so that your urine is light yellow or clear like water. Choose water or caffeine-free clear liquids until you feel better. If you have kidney, heart, or liver disease and have to limit fluids, talk with your doctor before you increase your fluid intake. You can try rehydration drinks, such as Gatorade or Powerade. · Do not drink alcohol. · Eat a healthy diet. Include fruits, vegetables, and whole grains in your diet every day. · Walking is an easy way to get exercise. Gradually increase the amount you walk every day. Make sure your doctor knows that you are starting an exercise program.  · Do not smoke or use other tobacco products. If you need help quitting, talk to your doctor about stop-smoking programs and medicines. These can increase your chances of quitting for good. When should you call for help? Call 911 anytime you think you may need emergency care. For example, call if:  ? · You passed out (lost consciousness). ?Call your doctor now or seek immediate medical care if:  ? · You have a fever or chills.    ? · You have cool, pale, or clammy skin. ? · You are dizzy or lightheaded, or you feel like you may faint. ? · You have any new symptoms, such as a cough, pain in one part of your body, or urinary problems. ? Watch closely for changes in your health, and be sure to contact your doctor if:  ? · You do not get better as expected. Where can you learn more? Go to http://chasidy-jayme.info/. Enter T311 in the search box to learn more about \"Sepsis: Care Instructions. \"  Current as of: March 20, 2017  Content Version: 11.4  © 6490-0259 [employer]. Care instructions adapted under license by [employer] (which disclaims liability or warranty for this information). If you have questions about a medical condition or this instruction, always ask your healthcare professional. [place] [address] any warranty or liability for your use of this information. Influenza (Flu): Care Instructions  Your Care Instructions    Influenza (flu) is an infection in the lungs and breathing passages. It is caused by the influenza virus. There are different strains, or types, of the flu virus from year to year. Unlike the common cold, the flu comes on suddenly and the symptoms, such as a cough, congestion, fever, chills, fatigue, aches, and pains, are more severe. These symptoms may last up to 10 days. Although the flu can make you feel very sick, it usually doesn't cause serious health problems. Home treatment is usually all you need for flu symptoms. But your doctor may prescribe antiviral medicine to prevent other health problems, such as pneumonia, from developing. Older people and those who have a long-term health condition, such as lung disease, are most at risk for having pneumonia or other health problems. Follow-up care is a key part of your treatment and safety. Be sure to make and go to all appointments, and call your doctor if you are having problems.  It's also a good idea to know your test results and keep a list of the medicines you take. How can you care for yourself at home? · Get plenty of rest.  · Drink plenty of fluids, enough so that your urine is light yellow or clear like water. If you have kidney, heart, or liver disease and have to limit fluids, talk with your doctor before you increase the amount of fluids you drink. · Take an over-the-counter pain medicine if needed, such as acetaminophen (Tylenol), ibuprofen (Advil, Motrin), or naproxen (Aleve), to relieve fever, headache, and muscle aches. Read and follow all instructions on the label. No one younger than 20 should take aspirin. It has been linked to Reye syndrome, a serious illness. · Do not smoke. Smoking can make the flu worse. If you need help quitting, talk to your doctor about stop-smoking programs and medicines. These can increase your chances of quitting for good. · Breathe moist air from a hot shower or from a sink filled with hot water to help clear a stuffy nose. · Before you use cough and cold medicines, check the label. These medicines may not be safe for young children or for people with certain health problems. · If the skin around your nose and lips becomes sore, put some petroleum jelly on the area. · To ease coughing:  ¨ Drink fluids to soothe a scratchy throat. ¨ Suck on cough drops or plain hard candy. ¨ Take an over-the-counter cough medicine that contains dextromethorphan to help you get some sleep. Read and follow all instructions on the label. ¨ Raise your head at night with an extra pillow. This may help you rest if coughing keeps you awake. · Take any prescribed medicine exactly as directed. Call your doctor if you think you are having a problem with your medicine. To avoid spreading the flu  · Wash your hands regularly, and keep your hands away from your face.   · Stay home from school, work, and other public places until you are feeling better and your fever has been gone for at least 24 hours. The fever needs to have gone away on its own without the help of medicine. · Ask people living with you to talk to their doctors about preventing the flu. They may get antiviral medicine to keep from getting the flu from you. · To prevent the flu in the future, get a flu vaccine every fall. Encourage people living with you to get the vaccine. · Cover your mouth when you cough or sneeze. When should you call for help? Call 911 anytime you think you may need emergency care. For example, call if:  ? · You have severe trouble breathing. ?Call your doctor now or seek immediate medical care if:  ? · You have new or worse trouble breathing. ? · You seem to be getting much sicker. ? · You feel very sleepy or confused. ? · You have a new or higher fever. ? · You get a new rash. ? Watch closely for changes in your health, and be sure to contact your doctor if:  ? · You begin to get better and then get worse. ? · You are not getting better after 1 week. Where can you learn more? Go to http://chasidy-jayme.info/. Enter D780 in the search box to learn more about \"Influenza (Flu): Care Instructions. \"  Current as of: May 12, 2017  Content Version: 11.4  © 8997-6209 [employer]. Care instructions adapted under license by [employer] (which disclaims liability or warranty for this information). If you have questions about a medical condition or this instruction, always ask your healthcare professional. [place] [address] any warranty or liability for your use of this information.

## 2018-02-05 NOTE — PROGRESS NOTES
Discharge plan: Discharge to rehab. Pt may possibly discharge tomorrow, 2/6/18    Chart reviewed. Met with patient at bedside. Pt to discharge to rehab. Pt chose [facility]. Referral placed with [facility] for assistance. Per Wolf RICHTER, pt will likely discharge tomorrow pending ID consult. Pt will require IV abx at discharge. Please advise on which IV abx will be needed. CM will cont to follow. 80  Spoke with patient's wife. She would like for patient to return to [facility], if possible. Katarina Sears Spouse [phone]     Per Wolf RICHTER we are awaiting ID consult tomorrow to determine which IV abx pt will be on at discharge. Pt has PICC. Message was left with Ella Mcintyre in admissions at [facility] at [phone]. CM will cont to follow. [address]. left for Sofia MIRANDA at [facility]. Will cont to follow. 1500  Received return call from Ella Mcintyre at [facility]. He will look over pts case and call back cm to let us know if pt will have enough rehab days to return. Ella Mcintyre states pt may be transitioning to [address] again to Ella Mcintyre at [facility]. He will price out abx (cefipime, levaquin) and let CM know tomorrow if they are able to accept. Jimmie aware after ID consult tomorrow the abx may change. CM will cont to follow. Message left for patient's wife, Lindsey Vidal informing her we are still awaiting accepting facility. CM went to pts bedside to update him on plan of care.

## 2018-02-05 NOTE — PROGRESS NOTES
Hospitalist Progress Note    Patient: Andrew Locke MRN: [id number]  CSN: [id number]    YOB: 1933  Age: 80 y.o. Sex: male    DOA: 1/31/2018 LOS:  LOS: 5 days          Chief Complaint:    Infected Prosthetic Joint    Assessment/Plan     1. S/P Washout for Infected Prosthetic Joint  2. Syncope  3. Influenza A  4. HTN  5. Dyslipidemia  6. Post-Anesthesia Delirium     1. Continue cefepime and vancomycin. Dr Bre Fitzgerald is on board. Will await her final recommendations prior to dc. Patient underwent PICC placement today. All cultures remain negative to date. 2. Workup has been negative. 3. Continue Tamiflu. 4. Continue current regimen, BP stable. 5. Continue Lipitor. 6. Resolved. Patient is A&Ox4. No confusion this AM upon exam.     Diabetic Diet & SSI coverage. DVT Prophylaxis - Lovenox  Dispo: PT/OT after surgery, await final recs from Dr Bre Fitzgerald. Will likely return to rehab, 1-2 days. Patient Active Problem List   Diagnosis Code    Diabetes mellitus type 2, controlled ([facility] [address] E11.9    Hyperlipidemia E78.5    HTN (hypertension) I10    Rupture of right quadriceps tendon S76.111A    Sepsis ([facility] [address] A41.9    Infected prosthetic knee joint ([facility] [address] T84.59XA, Z96.659    Syncope R55    Influenza J11.1       Subjective:    No concerns or complaints this AM.   Some residual knee pain. Review of systems:    Constitutional: denies fevers, chills, myalgias  Respiratory: denies SOB, cough  Cardiovascular: denies chest pain, palpitations  Gastrointestinal: denies nausea, vomiting, diarrhea      Vital signs/Intake and Output:  Visit Vitals    /59 (BP 1 Location: Left arm, BP Patient Position: At rest;Lying right side)    Pulse 77    Temp 97.9 °F (36.6 °C)    Resp 16    Ht 5' 5\" (1.651 m)    Wt 98.7 kg (217 lb 9.5 oz)    SpO2 99%    BMI 36.21 kg/m2     Current Shift:     Last three shifts:  02/03 1901 - 02/05 0700  In: 6350 [P.O.:840;  I.V.:1585]  Out: 1250 [Urine:1250]    Exam:    General: Elderly male, alert, NAD, OX3  Head/Neck: NCAT, supple, No masses, No lymphadenopathy  CVS:Regular rate and rhythm, no M/R/G, S1/S2 heard, no thrill  Lungs:Clear to auscultation bilaterally, no wheezes, rhonchi, or rales  Abdomen: Soft, Nontender, No distention, Normal Bowel sounds, No hepatomegaly  Extremities: Right knee in immobilizer, pulses palpable 2+  Skin:normal texture and turgor, no rashes, no lesions  Neuro:grossly normal , follows commands  Psych:appropriate                Labs: Results:       Chemistry Recent Labs      02/05/18   0112  02/04/18   0334  02/03/18   0310   GLU  83  89  143*   NA  141  141  136   K  3.4*  3.7  3.7   CL  108  109*  105   CO2  29  26  24   BUN  8  11  16   CREA  0.80  0.78  0.92   CA  7.6*  7.9*  7.0*   AGAP  4  6  7   BUCR  10*  14  17      CBC w/Diff Recent Labs      02/05/18   0112  02/04/18   0334  02/03/18   1400  02/03/18   0310   WBC  3.4*  3.1*   --   3.3*   RBC  2.84*  3.09*   --   2.41*   HGB  7.9*  8.5*  8.8*  6.5*   HCT  23.9*  26.1*  26.5*  20.5*   PLT  75*  96*   --   100*   GRANS  51  48   --   66   LYMPH  36  39   --   19*   EOS  1  2   --   0      Cardiac Enzymes Recent Labs      02/03/18   0310  02/02/18   2300   CPK  116  133   CKND1  2.0  2.1      Coagulation No results for input(s): PTP, INR, APTT in the last 72 hours. No lab exists for component: INREXT    Lipid Panel No results found for: CHOL, CHOLPOCT, CHOLX, CHLST, CHOLV, 639260, HDL, LDL, LDLC, DLDLP, 152006, VLDLC, VLDL, TGLX, TRIGL, TRIGP, TGLPOCT, CHHD, CHHDX   BNP No results for input(s): BNPP in the last 72 hours. Liver Enzymes No results for input(s): TP, ALB, TBIL, AP, SGOT, GPT in the last 72 hours.     No lab exists for component: DBIL   Thyroid Studies No results found for: T4, T3U, TSH, TSHEXT     Procedures/imaging: see electronic medical records for all procedures/Xrays and details which were not copied into this note but were reviewed prior to creation of Plan      Walter Garica James Waldrop PA-C

## 2018-02-05 NOTE — PROGRESS NOTES
Progress Note      Patient: Lexine Babinski               Sex: male          DOA: 1/31/2018       YOB: 1933      Age:  80 y.o.        LOS:  LOS: 5 days     Status Post: Procedure(s):  EXPLANT EJ PROSTHETIC RIGHT KNEE, SPACER INSERTION, INCISION AND DRAINAGE RIGHT KNEE, PATIENT IS IN ROOM 346  Surgery Date: 2/2/2018            Subjective:     Lexine Babinski is a 80 y.o. male who c/o right knee pain reasonably well controlled with prn pain meds. Objective:      Visit Vitals    /48 (BP 1 Location: Left arm, BP Patient Position: At rest)    Pulse 80    Temp 98 °F (36.7 °C)    Resp 19    Ht 5' 5\" (1.651 m)    Wt 98.7 kg (217 lb 9.5 oz)    SpO2 99%    BMI 36.21 kg/m2       Physical Exam:   Dressing:  clean, dry, intact   Wiggles Toes/Ankle  Foot sensation intact to light touch  No foot edema/ +1 Posterior Tibial Pulse    Intake and Output:  Current Shift:     Last three shifts:  02/03 1901 - 02/05 0700  In: 7043 [P.O.:840; I.V.:1585]  Out: 1250 [Urine:1250]  Voiding Status:  +void without need for siu catheter    Lab/Data Reviewed:  Recent Labs      02/05/18   0112   HGB  7.9*   HCT  23.9*   NA  141   K  3.4*   CL  108   CO2  29   BUN  8   CREA  0.80   GLU  83       Medications Reviewed    Assessment/Plan     Principal Problem:    Infected prosthetic knee joint ([facility] [address] (1/31/2018)    Active Problems:    Diabetes mellitus type 2, controlled ([address] (5/9/2017)      Hyperlipidemia (5/9/2017)      HTN (hypertension) (5/9/2017)      Sepsis ([facility] [address] (1/31/2018)      Syncope (1/31/2018)      Influenza (2/2/2018)        · Continue abx per ID, pending cultures  · Continue meds per hospitalist  · Discharge Planning for rehab   · Continue DVT Prophylaxis. · Continue PT and ROM               The patient was seen and examined by Dr. Gene Wang today as well and he is in agreement with above.

## 2018-02-05 NOTE — ROUTINE PROCESS
Bedside and Verbal shift change report given to Tika Lozano  (oncoming nurse) by Mary Anne Sultana RN  (offgoing nurse). Report given with SBAR, Kardex, Intake/Output and Recent Results.

## 2018-02-05 NOTE — PROGRESS NOTES
Problem: Self Care Deficits Care Plan (Adult)  Goal: *Acute Goals and Plan of Care (Insert Text)  Initial Occupational Therapy Goals (2/5/2018) Within 7 day(s):    1. Patient will perform grooming seated EOB with setup & 1-2 verbal cues for attention to task  for increased independence in ADLs. 2. Patient will perform UB dressing with setup/min & 1-2 verbal cues for attention to task seated EOB for increased independence with ADLs. 3. Patient will perform LB dressing with modA & A/E PRN for increased independence with ADLs. 4. Patient will perform all aspects of toileting with modA for increased independence with ADLs. 5. Patient will perform LB ADLs utilizing body mechanics & adaptive strategies with 1 verbal cue for increased safety in ADLs. 6. Patient will independently apply energy conservation techniques with 1 verbal cue for increased independence with ADLs. Outcome: Progressing Towards Goal  Occupational Therapy EVALUATION    Patient: Magda Weiss (14 y.o. male)  Date: 2/5/2018  Primary Diagnosis: Sepsis ([address]  COMPLICATION FROM PROSTHETIC DEVICE RIGHT KNEE  Procedure(s) (LRB):  EXPLANT EJ PROSTHETIC RIGHT KNEE, SPACER INSERTION, INCISION AND DRAINAGE RIGHT KNEE, PATIENT IS IN ROOM 346 (Right) 3 Days Post-Op   Precautions: Skin,  Fall, PWB (50% R LE); KI    ASSESSMENT :  Based on the objective data described below, the patient presents with decreased functional strength, decreased functional balance, decreased overall activity tolerance limiting independence with ADLs. Patient seen with PT for second set of skilled hands to maximize independence and safety. Patient greatly limited by cognition and decreased UE strength to compensate for PWB. Pt w/ early termination of task, requiring max encouragement and tactile cues to attempt task again. Pt would benefit from continued OT services to maximize independence in ADLs and mob.     Education: Reviewed home safety, body mechanics, importance of moving every hour to prevent joint stiffness, role of ice for edema/pain control, Rolling Walker management/safety, and adaptive dressing techniques with patient verbalizing  understanding at this time     Patient will benefit from skilled intervention to address the above impairments. Patients rehabilitation potential is considered to be Fair  Factors which may influence rehabilitation potential include:   []             None noted  [x]             Mental ability/status  [x]             Medical condition  []             Home/family situation and support systems  [x]             Safety awareness  [x]             Pain tolerance/management  []             Other:      PLAN :  Recommendations and Planned Interventions:  [x]               Self Care Training                  [x]        Therapeutic Activities  [x]               Functional Mobility Training    [x]        Cognitive Retraining  [x]               Therapeutic Exercises           [x]        Endurance Activities  [x]               Balance Training                   [x]        Neuromuscular Re-Education  []               Visual/Perceptual Training     [x]   Home Safety Training  [x]               Patient Education                 [x]        Family Training/Education  []               Other (comment):    Frequency/Duration: Patient will be followed by occupational therapy 1-2 times per day/4-7 days per week to address goals. Discharge Recommendations: Rehab  Further Equipment Recommendations for Discharge: To Be Determined (TBD) at next level of care      SUBJECTIVE:   Patient stated I can't.     OBJECTIVE DATA SUMMARY:     Past Medical History:   Diagnosis Date    Arthritis     Coagulation disorder ([facility] [address]     Lovenox    Diabetes ([facility] [address] 2005    diet controlled, on insulin at this time (1/31/18)    Hypercholesteremia     Hypertension 2005    Sleep apnea 2008    no treatment    Urinary urgency      Past Surgical History:   Procedure Laterality Date    HX APPENDECTOMY      HX BACK SURGERY  11/2017    X 3-last surgery 11/2017    HX BACK SURGERY  1990's    cervcal neck    HX CATARACT REMOVAL      bilateral    HX ORTHOPAEDIC  2013    right knee replacement    HX ORTHOPAEDIC  05/2017    ligament repair    HX VASECTOMY       Barriers to Learning/Limitations: yes;  cognitive, sensory deficits-vision/hearing/speech, physical and altered mental status (i.e.Sedation, Confusion)  Compensate with: visual, verbal, tactile, kinesthetic cues/model    Prior Level of Function/Home Situation: Pt was living with wife prior to quad tendon repair. Pt was at [facility] for [address] Environment: Private residence  # Steps to Enter: 4  Rails to Enter: Yes  Hand Rails : Bilateral  One/Two Story Residence: Two story, live on 1st floor  Living Alone: No  Support Systems: Spouse/Significant Other/Partner, Child(isabel)  Patient Expects to be Discharged to[de-identified] Rehabilitation facility  Current DME Used/Available at Home: Walker, rolling  [x]  Right hand dominant   []  Left hand dominant    Cognitive/Behavioral Status:  Neurologic State: Confused  Orientation Level: Disoriented to situation;Oriented to person;Oriented to place;Oriented to time  Cognition: Decreased attention/concentration;Decreased command following; Impaired decision making;Memory loss;Poor safety awareness  Safety/Judgement: Awareness of environment;Decreased awareness of need for assistance;Decreased awareness of need for safety; Lack of insight into deficits    Skin: frail  Edema: RLE    Vision/Perceptual:     appears WFL w/ glasses; scanning appears limited d/t cognition    Coordination:  Coordination: Within functional limits  Fine Motor Skills-Upper: Left Intact; Right Intact    Gross Motor Skills-Upper: Left Intact; Right Intact    Balance:  Sitting: Intact  Standing: Impaired; With support    Strength:  Strength: Generally decreased, functional  Tone & Sensation:  Tone: Normal  Sensation: Intact  Range of Motion:  AROM: Generally decreased, functional  PROM: Generally decreased, functional    Functional Mobility and Transfers for ADLs:  Bed Mobility:  Supine to Sit: Maximum assistance  Sit to Supine: Maximum assistance  Scooting: Maximum assistance  Transfers:  Sit to Stand: Maximum assistance;Assist x1;Assist x2    ADL Assessment:   Feeding: Contact guard assistance;Minimum assistance    Oral Facial Hygiene/Grooming: Minimum assistance    Bathing: Maximum assistance    Upper Body Dressing: Moderate assistance;Maximum assistance    Lower Body Dressing: Maximum assistance; Total assistance    Toileting: Total assistance    ADL Intervention:  Grooming  Brushing/Combing Hair: Minimum assistance    Lower Body Dressing Assistance  Socks: Total assistance (dependent)  Position Performed: Seated edge of bed    Toileting  Toileting Assistance: Total assistance(dependent)    Cognitive Retraining  Orientation Retraining: Reorienting  Problem Solving: Identifying the task; Identifying the problem;General alternative solution;Deductive reason  Executive Functions: Regulating behavior  Organizing/Sequencing: Breaking task down;Prioritizing  Attention to Task: Single task  Maintains Attention For (Time): 30 seconds  Following Commands: Awareness of environment; Follows one step commands/directions  Safety/Judgement: Awareness of environment;Decreased awareness of need for assistance;Decreased awareness of need for safety; Lack of insight into deficits  Cues: Verbal cues provided; Tactile cues provided;Visual cues provided    Pain:  Pre-treatment: 5/10  Post-treatment: 5/10    Activity Tolerance:   Patient able to stand <1 minute(s). Patient able to complete ADLs with frequent rest breaks. Patient limited by pain/cognition/strength. Patient unsteady     Please refer to the flowsheet for vital signs taken during this treatment.   After treatment:   [] Patient left in no apparent distress sitting up in chair  [x] Patient left in no apparent distress in bed  [x] Call bell left within reach  [x] Nursing notified  [] Caregiver present  [] Bed alarm activated    COMMUNICATION/EDUCATION:   [x] Home safety education was provided and the patient/caregiver indicated understanding. [x] Patient/family have participated as able in goal setting and plan of care. [x] Patient/family agree to work toward stated goals and plan of care. [] Patient understands intent and goals of therapy, but is neutral about his/her participation. [] Patient is unable to participate in goal setting and plan of care. Thank you for this referral.  Jolene Panda, OTR/L  Time Calculation: 19 mins    G-Codes (GP)  Self Care   Current  CM= 80-99%   Goal  CK= 40-59%  The severity rating is based on the professional judgement & direct observation of Level of Assistance required for Functional Mobility and ADLs. Eval Complexity: History: HIGH Complexity : Extensive review of history including physical, cognitive and psychosocial history ; Examination: HIGH Complexity : 5 or more performance deficits relating to physical, cognitive , or psychosocial skils that result in activity limitations and / or participation restrictions; Decision Making:HIGH Complexity : Patient presents with comorbidities that affect occupational performance.  Signifigant modification of tasks or assistance (eg, physical or verbal) with assessment (s) is necessary to enable patient to complete evaluation

## 2018-02-05 NOTE — PROGRESS NOTES
[id number] Shift Summary: Pt rested overnight with no complaints. He yelled out a few times in the beginning of the shift but was redirected. No new clinical concerns noted.

## 2018-02-05 NOTE — PROGRESS NOTES
[facility]ists  Pulmonary, Critical Care, and Sleep Medicine    Name: Christina Walker MRN: [id number]   : 1933 Hospital: [facility] [place]   Date: 2018        [facility]                                              Reason for CC Consult: Sepsis with infected knee joint    Subjective/History:   Christina Walker has been seen and evaluated at the request of NEELAM Winkler. Patient is a 80 y.o. male who had been admitted to [facility] on 18 for syncopal episode and fever. He had R knee replacement done about 5 years ago. In May 2017, he had RT knee surgery done for qudricep muscle repair and since then he is having knee swelling and pain. About 3 weeks ago, he was diagnosed with R knee cellultiis and treated with po abx for 10 days at rehab. He has seen orthopedic surgeon as out patient and knee aspiration done on  as out patient and cultures showed no growth by history. He is suspected to have R knee infection and scheduled to have exaplantation of TKA and spacer placement by Dr Nadira Lafleur next week. He came to hospital for pre op lab draw on  and found to have AMS with fever and admitted for sepsis. He has CTA chest neg for PEs in the ER on admission. He was tested + for Influenza A. Today, patient went to OR for explant of prosthetic knee, spacer insertion, I&D. EBL was 50 mls. He had 2 lits in OR. Post-op, he was confused and hypotensive and moved to ICU for further management. 18: This is a late entry note due to technical issues on writing note on epic on 18  aaox3  No fever  No acute issues overnight. No chest pains or cough or shortness of breath. C/p RT knee spasm like pains. Review of Systems:  Review of systems not obtained due to patient factors.     Latest lactic acid:   Lactic acid   Date Value Ref Range Status   2018 1.1 0.4 - 2.0 MMOL/L Final   2018 2.3 (HH) 0.4 - 2.0 MMOL/L Final     Comment:     CALLED TO AND CORRECTLY REPEATED BY: Isidoro Acosta RN ICU TO [facility] AT [id number] ON [id number]     01/31/2018 1.6 0.4 - 2.0 MMOL/L Final       Past Medical History:  Past Medical History:   Diagnosis Date    Arthritis     Coagulation disorder ([facility] [address]     Lovenox    Diabetes ([facility] [address] 2005    diet controlled, on insulin at this time (1/31/18)    Hypercholesteremia     Hypertension 2005    Sleep apnea 2008    no treatment    Urinary urgency         Past Surgical History:  Past Surgical History:   Procedure Laterality Date    HX APPENDECTOMY      HX BACK SURGERY  11/2017    X 3-last surgery 11/2017    HX BACK SURGERY  1990's    cervcal neck    HX CATARACT REMOVAL      bilateral    HX ORTHOPAEDIC  2013    right knee replacement    HX ORTHOPAEDIC  05/2017    ligament repair    HX VASECTOMY          Medications:  Prior to Admission medications    Medication Sig Start Date End Date Taking? Authorizing Provider   docusate sodium (COLACE) 100 mg capsule Take 1 Cap by mouth two (2) times a day for 30 days. 2/5/18 3/7/18 Yes Jaycob Naik, DO   ferrous sulfate 325 mg (65 mg iron) tablet Take 1 Tab by mouth two (2) times daily (with meals). 2/5/18  Yes Shannon Hagen, DO   enoxaparin (LOVENOX) 40 mg/0.4 mL 0.4 mL by SubCUTAneous route daily. 2/5/18  Yes Jaida Angeles, DO   traMADol (ULTRAM) 50 mg tablet Take 1 Tab by mouth every six (6) hours as needed for Pain. Max Daily Amount: 200 mg. Indications: NEUROPATHIC PAIN 2/5/18  Yes Jaida Angeles, DO   alfuzosin SR (UROXATRAL) 10 mg SR tablet Take 10 mg by mouth daily. Indications: Urolithiasis 11/22/17   Historical Provider   atorvastatin (LIPITOR) 20 mg tablet Take 20 mg by mouth daily. Historical Provider   bethanechol (URECHOLINE) 25 mg tablet Take 25 mg by mouth three (3) times daily. 11/21/17   Historical Provider   bisacodyl (DULCOLAX, BISACODYL,) 5 mg EC tablet Take 10 mg by mouth daily as needed for Constipation.  Indications: constipation    Historical Provider   doxycycline (VIBRAMYCIN) 100 mg capsule 1 Cap two (2) times a day. 1/13/18   Historical Provider   DOCUSATE SODIUM (DSS PO) Take 2 Caps by mouth two (2) times daily as needed. Historical Provider   insulin lispro (HUMALOG) 100 unit/mL injection 0-5 Units by SubCUTAneous route four (4) times daily. Indications: type 2 diabetes mellitus    Historical Provider   lisinopril (PRINIVIL, ZESTRIL) 5 mg tablet Take 5 mg by mouth daily. Historical Provider   aspirin delayed-release 81 mg tablet Take 81 mg by mouth daily. Historical Provider   oxyCODONE IR (ROXICODONE) 5 mg immediate release tablet Take 1-2 Tabs by mouth every four (4) hours as needed for Pain. Max Daily Amount: 60 mg. 5/11/17   Herman Roque PA-C   trandolapril (MAVIK) 2 mg tablet Take 2 mg by mouth daily. Indications: hypertension    Historical Provider   ezetimibe (ZETIA) 10 mg tablet Take  by mouth nightly. Historical Provider   rosuvastatin (CRESTOR) 10 mg tablet Take 10 mg by mouth daily. Indications: hyperlipidemia    Historical Provider   pregabalin (LYRICA) 150 mg capsule Take 150 mg by mouth daily. Indications: Diabetic Peripheral Neuropathy    Historical Provider   potassium chloride SR (KLOR-CON 8) 8 mEq tablet Take 8 mEq by mouth daily. Historical Provider   amLODIPine (NORVASC) 5 mg tablet Take 5 mg by mouth daily. Indications: hypertension    Historical Provider   furosemide (LASIX) 20 mg tablet Take 20 mg by mouth daily.  Indications: Edema, h/o cellulitis    Historical Provider       Current Facility-Administered Medications   Medication Dose Route Frequency    sodium chloride (NS) flush 5-10 mL  5-10 mL IntraVENous Q8H    ferrous sulfate tablet 325 mg  1 Tab Oral BID WITH MEALS    docusate sodium (COLACE) capsule 100 mg  100 mg Oral BID    enoxaparin (LOVENOX) injection 40 mg  40 mg SubCUTAneous DAILY    Vancomycin - Pharmacy to dose   1 Each Other Rx Dosing/Monitoring    insulin lispro (HUMALOG) injection   SubCUTAneous AC&HS    famotidine (PEPCID) tablet 20 mg  20 mg Oral BID    cefepime (MAXIPIME) 2 g in sterile water (preservative free) 10 mL IV syringe  2 g IntraVENous Q12H    vancomycin (VANCOCIN) 1,000 mg in 0.9% sodium chloride (MBP/ADV) 250 mL adv  1,000 mg IntraVENous Q12H    traZODone (DESYREL) tablet 50 mg  50 mg Oral QHS    oseltamivir (TAMIFLU) capsule 75 mg  75 mg Oral BID    alfuzosin SR (UROXATRAL) tablet 10 mg  10 mg Oral DAILY    atorvastatin (LIPITOR) tablet 20 mg  20 mg Oral DAILY    bethanechol (URECHOLINE) tablet 25 mg  25 mg Oral TID    0.9% sodium chloride infusion  100 mL/hr IntraVENous CONTINUOUS       Allergy:  Allergies   Allergen Reactions    Oxycodone Other (comments)     :makes him crazy per wife        Social History:  Social History   Substance Use Topics    Smoking status: Never Smoker    Smokeless tobacco: Never Used    Alcohol use No      Comment:          Family History:  History reviewed. No pertinent family history. Objective:   Vital Signs:    Blood pressure 134/50, pulse 74, temperature 98.1 °F (36.7 °C), resp. rate 16, height 5' 5\" (1.651 m), weight 98.7 kg (217 lb 9.5 oz), SpO2 98 %. Body mass index is 36.21 kg/(m^2). O2 Device: Nasal cannula   O2 Flow Rate (L/min): 2 l/min   Temp (24hrs), Av °F (36.7 °C), Min:97.6 °F (36.4 °C), Max:98.6 °F (37 °C)       Patient Vitals for the past 8 hrs:   Temp Pulse Resp BP SpO2   18 0755 98.1 °F (36.7 °C) 74 16 134/50 98 %   18 0337 98 °F (36.7 °C) 80 19 125/48 99 %     Intake/Output:   Last shift:         Last 3 shifts:  1901 -  0700  In: 0084 [P.O.:840;  I.V.:1585]  Out: 1250 [Urine:1250]    Intake/Output Summary (Last 24 hours) at 18 0954  Last data filed at 18 2205   Gross per 24 hour   Intake              480 ml   Output              750 ml   Net             -270 ml        Physical Exam:   Comfortable; on 2 lits nc; acyanotic  HEENT: pupils not dilated, reactive, no scleral jaundice, dry oral mucosa, no nasal drainage  Neck: No adenopathy or thyroid swelling  CVS: S1S2 no murmurs; JVD not elevated  RS: Mod air entry bilaterally, decreased BS at bases, lungs clear-no wheezes or crackles  Abd: soft, non tender, no hepatosplenomegaly, no distension or guarding or rigidity  Neuro: non focal, awake, alert  Extrm: RT foot edema, RT LE in splint; no cyanosis or clubbing  Skin: no rash  Lymphatic: no cervical or supraclavicular adenopathy  Psych: normal mood      Data:     Recent Results (from the past 24 hour(s))   GLUCOSE, POC    Collection Time: 02/04/18 12:01 PM   Result Value Ref Range    Glucose (POC) 153 (H) 70 - 110 mg/dL   GLUCOSE, POC    Collection Time: 02/04/18  4:27 PM   Result Value Ref Range    Glucose (POC) 185 (H) 70 - 110 mg/dL   GLUCOSE, POC    Collection Time: 02/04/18  9:33 PM   Result Value Ref Range    Glucose (POC) 98 70 - 141 mg/dL   METABOLIC PANEL, BASIC    Collection Time: 02/05/18  1:12 AM   Result Value Ref Range    Sodium 141 136 - 145 mmol/L    Potassium 3.4 (L) 3.5 - 5.5 mmol/L    Chloride 108 100 - 108 mmol/L    CO2 29 21 - 32 mmol/L    Anion gap 4 3.0 - 18 mmol/L    Glucose 83 74 - 99 mg/dL    BUN 8 7.0 - 18 MG/DL    Creatinine 0.80 0.6 - 1.3 MG/DL    BUN/Creatinine ratio 10 (L) 12 - 20      GFR est AA >60 >60 ml/min/1.73m2    GFR est non-AA >60 >60 ml/min/1.73m2    Calcium 7.6 (L) 8.5 - 10.1 MG/DL   CBC WITH AUTOMATED DIFF    Collection Time: 02/05/18  1:12 AM   Result Value Ref Range    WBC 3.4 (L) 4.6 - 13.2 K/uL    RBC 2.84 (L) 4.70 - 5.50 M/uL    HGB 7.9 (L) 13.0 - 16.0 g/dL    HCT 23.9 (L) 36.0 - 48.0 %    MCV 84.2 74.0 - 97.0 FL    MCH 27.8 24.0 - 34.0 PG    MCHC 33.1 31.0 - 37.0 g/dL    RDW 17.4 (H) 11.6 - 14.5 %    PLATELET 75 (L) 922 - 420 K/uL    MPV 8.9 (L) 9.2 - 11.8 FL    NEUTROPHILS 51 40 - 73 %    LYMPHOCYTES 36 21 - 52 %    MONOCYTES 12 (H) 3 - 10 %    EOSINOPHILS 1 0 - 5 %    BASOPHILS 0 0 - 2 %    ABS. NEUTROPHILS 1.8 1.8 - 8.0 K/UL    ABS.  LYMPHOCYTES 1.2 0.9 - 3.6 K/UL ABS. MONOCYTES 0.4 0.05 - 1.2 K/UL    ABS. EOSINOPHILS 0.0 0.0 - 0.4 K/UL    ABS. BASOPHILS 0.0 0.0 - 0.06 K/UL    DF AUTOMATED     GLUCOSE, POC    Collection Time: 02/05/18  6:12 AM   Result Value Ref Range    Glucose (POC) 88 70 - 110 mg/dL           No results for input(s): FIO2I, IFO2, HCO3I, IHCO3, HCOPOC, PCO2I, PCOPOC, IPHI, PHI, PHPOC, PO2I, PO2POC in the last 72 hours.     No lab exists for component: IPOC2    All Micro Results     Procedure Component Value Units Date/Time    CULTURE, BLOOD [[id number]] Collected:  01/31/18 1616    Order Status:  Completed Specimen:  Blood from Blood Updated:  02/05/18 0850     Special Requests: NO SPECIAL REQUESTS        Culture result: NO GROWTH 5 DAYS       CULTURE, TISSUE Tania Heft STAIN [[id number]] Collected:  02/02/18 0915    Order Status:  Completed Specimen:  Knee  Updated:  02/05/18 0754     Special Requests: RT KNEE SYNOVIAL     GRAM STAIN FEW WBC'S         NO ORGANISMS SEEN        Culture result: NO GROWTH 3 DAYS       CULTURE, TISSUE Twilla [place] [[id number]] Collected:  02/02/18 0915    Order Status:  Completed Specimen:  Knee  Updated:  02/05/18 0754     Special Requests: NO SPECIAL REQUESTS        GRAM STAIN FEW WBC'S         NO ORGANISMS SEEN        Culture result: NO GROWTH 3 DAYS       CULTURE, Taisha Ohm STAIN [[id number]] Collected:  02/02/18 0915    Order Status:  Completed Specimen:  Wound from Knee  Updated:  02/05/18 0752     Special Requests: NO SPECIAL REQUESTS        GRAM STAIN FEW WBC'S         NO ORGANISMS SEEN        Culture result: NO GROWTH 3 DAYS       CULTURE, BODY FLUID Tania Heft STAIN [[id number]] Collected:  02/02/18 0915    Order Status:  Completed Specimen:  Joint Fluid Updated:  02/05/18 0751     Special Requests: NO SPECIAL REQUESTS        GRAM STAIN MODERATE WBC'S         NO ORGANISMS SEEN        Culture result: NO GROWTH 3 DAYS       CULTURE, ANAEROBIC [[id number]] Collected:  02/02/18 0915    Order Status:  Completed Specimen:  Knee Updated:  02/05/18 0706     Special Requests: RT KNEE SYNOVIAL     Culture result:         NO ANAEROBES ISOLATED 3 DAYS    CULTURE, ANAEROBIC [[id number]] Collected:  02/02/18 0915    Order Status:  Completed Specimen:  Knee  Updated:  02/05/18 0706     Special Requests: NO SPECIAL REQUESTS        Culture result:         NO ANAEROBES ISOLATED 3 DAYS    CULTURE, ANAEROBIC [[id number]] Collected:  02/02/18 0915    Order Status:  Completed Specimen:  Knee  Updated:  02/05/18 0705     Special Requests: NO SPECIAL REQUESTS        Culture result:         NO ANAEROBES ISOLATED 3 DAYS    CULTURE, ANAEROBIC [[id number]] Collected:  02/02/18 0915    Order Status:  Completed Specimen:  Knee  Updated:  02/05/18 0705     Special Requests: NO SPECIAL REQUESTS        Culture result:         NO ANAEROBES ISOLATED 3 DAYS    CULTURE, URINE [[id number]] Collected:  01/31/18 1705    Order Status:  Completed Specimen:  Urine from Clean catch Updated:  02/02/18 0901     Special Requests: NO SPECIAL REQUESTS        Culture result: NO GROWTH 2 DAYS       INFLUENZA A & B AG (RAPID TEST) [[id number]]  (Abnormal) Collected:  02/01/18 1554    Order Status:  Completed Specimen:  Nasopharyngeal from Nasal washing Updated:  02/01/18 1623     Influenza A Antigen POSITIVE (A)        Influenza B Antigen NEGATIVE              Telemetry: normal sinus rhythm    ECHO 2/1/18   SUMMARY:  Left ventricle: Systolic function was normal by EF (biplane method of disks).  Ejection fraction was estimated to be 60  % in the range of 55 % to 65 %. There were no regional wall motion abnormalities. Wall thickness was mildly increased. There was mild concentric hypertrophy. Doppler parameters were consistent with abnormal left ventricular relaxation  (grade 1 diastolic dysfunction). Right ventricle: The size was normal. Systolic function was normal. Systolic pressure was mildly increased. Estimated  peak pressure was 34 mmHg. Tricuspid valve:  There was mild pulmonary hypertension. Inferior vena cava, hepatic veins: The respirophasic change in diameter was less than 50%. INDICATIONS: Syncope. Imaging:  [x]I have personally reviewed the patients chest radiographs images and report   Knee Xray 2/2/18    Results from Hospital Encounter encounter on 01/31/18   XR CHEST PORT   Narrative EXAM: AP radiograph of the chest    INDICATION: Chest pain    COMPARISON: January 31, 2018, November 16, 2017    _______________    FINDINGS:    Normal heart size and mediastinal contour. No consolidation, pleural effusion,  or pneumothorax. No acute osseous abnormalities. Lower cervical anterior fusion. _______________         Impression IMPRESSION:    No acute pulmonary findings               Results from [place] encounter on 01/31/18   CTA CHEST W OR W WO CONT   Narrative EXAM: CTA Chest    INDICATION: Syncopal episode, altered mental status. Sepsis. Dilation for  possible pulmonary embolism. COMPARISON: Chest radiograph of 1/31/2018. No prior cross-sectional imaging of  the chest is available for review. TECHNIQUE: Axial CT imaging from the thoracic inlet through the diaphragm with  intravenous contrast utilizing CTA study for pulmonary artery evaluation. Coronal and sagittal MIP reformations were generated at a separate workstation. One or more dose reduction techniques were used on this CT: automated exposure  control, adjustment of the mAs and/or kVp according to patient's size, and  iterative reconstruction techniques. The specific techniques utilized on this CT  exam have been documented in the patient's electronic medical record.    _______________    FINDINGS:    EXAM QUALITY: Secondary to a combination of bolus timing and respiratory motion,  the examination is only adequate exclusion of large, central pulmonary embolism. PULMONARY ARTERIES: There is no evidence of large, central pulmonary embolism. The proximal lobar pulmonary arterial branches are patent. Poor pulmonary  arterial opacification as well as respiratory motion artifact results in  inadequate evaluation of the remaining pulmonary arterial tree. The main  pulmonary size is within normal limits. MEDIASTINUM: Cardiac size is normal. Coronary arterial atherosclerotic  calcifications are present. There is no pericardial effusion present. Thoracic  aorta is normal in course and caliber, with scattered atherosclerotic  calcifications demonstrated. LYMPH NODES: There is no supraclavicular, axillary, or mediastinal adenopathy. AIRWAY: Central airways are patent. LUNGS: Evaluation of the lung parenchyma is limited by respiratory motion  artifact. There is no focal pneumonic consolidation demonstrated. PLEURA: No pleural effusion or pneumothorax. UPPER ABDOMEN: Calcified gallstone is demonstrated within a nondistended  gallbladder. A small hiatal hernia is present. .    OTHER: No acute osseous abnormality. Multilevel thoracic spondylosis noted,  with prior posterior decompression involving the lower thoracic spine.    _______________         Impression IMPRESSION:    1. Technically limited examination secondary to combination of contrast bolus  timing and respiratory motion artifact without evidence of large, central  pulmonary embolism. Normal main pulmonary arterial size. 2. No focal pneumonic consolidation. No pleural effusion. 3. Cholelithiasis. 4. Small hiatal hernia.         IMPRESSION:   · Principal Problem:  · Infected prosthetic knee joint ([address] (1/31/2018)  · Hypotension  · Acute metabolic encephalopthy-post op state  · Anemia with mild bleeding at surgical site (can't explain the H&H drop), likely dilutional.   ·   · Active Problems:  ·   Diabetes mellitus type 2, controlled ([address] (5/9/2017)  ·   Hyperlipidemia (5/9/2017)  ·   HTN (hypertension) (5/9/2017)  ·   Sepsis ([address] (1/31/2018)  ·   Syncope (1/31/2018)  ·   Influenza (2/2/2018)   RECOMMENDATIONS:   Resp stable; continue nc O2; incent spirometry; titrate FiO2 for goal SPO2> 92%; consider BIPAP if patient develops CHF from fluid resuscitation. CXR 2/3/18 unremarkable. Cardiac-hypotension improved. Trop unremarkable. LA 2.3, repeat normal 1.1. doubt sever sepsis. Monitor for any sign of fluid overload. No active excessive external bleeding. No abd pain. Frequent H&H check. Stool occult blood. Antibiotic choice: cefepime and iv vancomycin; blood and tissue/wound cx: NGTD  Oral Tamiflu x 5 days for Influenza A positive; droplet precautions  Lactic acid repeat ordered. Glycemic control-poc glucose and SSI  Stress ulcer prophylaxis  DVT prophylaxis-SCD on left leg; low Hb; check US LEs; once Hb stable, would need Ortho clearance to start chemical DVT prophylaxis   Diet - oral ADA  Patient has no siu or central line  Watch IOs    Further recommendations will be based on the patient's response to recommended treatment and results of the investigation ordered. Quality Care: PPI, DVT prophylaxis, HOB elevated, Infection control all reviewed and addressed. [x]See my orders for details    My assessment, plan of care, findings, medications, side effects etc were discussed with:  [x]nursing []PT/OT    []respiratory therapy []Dr. Krystle Downey [x]Patient     [x]Total critical care time exclusive of procedures 32 minutes with complex decision making performed and > 50% time spent in face to face evaluation.     Larry Samano MD

## 2018-02-05 NOTE — PROGRESS NOTES
[facility]  Pulmonary, Critical Care, and Sleep Medicine    Name: Manju Yanes MRN: [id number]   : 1933 Hospital: [facility] [place]   Date: 2018        PCCM progress note                                              Reason for CC Consult: Sepsis with infected knee joint    Subjective/History:   Manju Yanes has been seen and evaluated at the request of NEELAM Lu. Patient is a 80 y.o. male who had been admitted to [facility] on 18 for syncopal episode and fever. He had R knee replacement done about 5 years ago. In May 2017, he had RT knee surgery done for qudricep muscle repair and since then he is having knee swelling and pain. About 3 weeks ago, he was diagnosed with R knee cellultiis and treated with po abx for 10 days at rehab. He has seen orthopedic surgeon as out patient and knee aspiration done on  as out patient and cultures showed no growth by history. He is suspected to have R knee infection and scheduled to have exaplantation of TKA and spacer placement by Dr Anurag Esposito next week. He came to hospital for pre op lab draw on  and found to have AMS with fever and admitted for sepsis. He has CTA chest neg for PEs in the ER on admission. He was tested + for Influenza A. Today, patient went to OR for explant of prosthetic knee, spacer insertion, I&D. EBL was 50 mls. He had 2 lits in OR. Post-op, he was confused and hypotensive and moved to ICU for further management. 18: On medical floor   On RA resting  aaox3  No fever  No acute issues overnight. No chest pains or cough or shortness of breath. Review of Systems:  Review of systems not obtained due to patient factors.     Latest lactic acid:   Lactic acid   Date Value Ref Range Status   2018 1.1 0.4 - 2.0 MMOL/L Final   2018 2.3 (HH) 0.4 - 2.0 MMOL/L Final     Comment:     CALLED TO AND CORRECTLY REPEATED BY:  Severo Apley RN ICU TO [facility] AT 1825 ON [id number]     2018 1.6 0.4 - 2.0 MMOL/L Final       Past Medical History:  Past Medical History:   Diagnosis Date    Arthritis     Coagulation disorder [address] [address]     Lovenox    Diabetes ([facility] [address] 2005    diet controlled, on insulin at this time (1/31/18)    Hypercholesteremia     Hypertension 2005    Sleep apnea 2008    no treatment    Urinary urgency         Past Surgical History:  Past Surgical History:   Procedure Laterality Date    HX APPENDECTOMY      HX BACK SURGERY  11/2017    X 3-last surgery 11/2017    HX BACK SURGERY  1990's    cervcal neck    HX CATARACT REMOVAL      bilateral    HX ORTHOPAEDIC  2013    right knee replacement    HX ORTHOPAEDIC  05/2017    ligament repair    HX VASECTOMY          Medications:  Prior to Admission medications    Medication Sig Start Date End Date Taking? Authorizing Provider   docusate sodium (COLACE) 100 mg capsule Take 1 Cap by mouth two (2) times a day for 30 days. 2/5/18 3/7/18 Yes Jaycob Sheth, DO   ferrous sulfate 325 mg (65 mg iron) tablet Take 1 Tab by mouth two (2) times daily (with meals). 2/5/18  Yes Glendy Hagen, DO   enoxaparin (LOVENOX) 40 mg/0.4 mL 0.4 mL by SubCUTAneous route daily. 2/5/18  Yes Jerilee Felty, DO   traMADol (ULTRAM) 50 mg tablet Take 1 Tab by mouth every six (6) hours as needed for Pain. Max Daily Amount: 200 mg. Indications: NEUROPATHIC PAIN 2/5/18  Yes Jerilee Felty, DO   alfuzosin SR (UROXATRAL) 10 mg SR tablet Take 10 mg by mouth daily. Indications: Urolithiasis 11/22/17   Historical Provider   atorvastatin (LIPITOR) 20 mg tablet Take 20 mg by mouth daily. Historical Provider   bethanechol (URECHOLINE) 25 mg tablet Take 25 mg by mouth three (3) times daily. 11/21/17   Historical Provider   bisacodyl (DULCOLAX, BISACODYL,) 5 mg EC tablet Take 10 mg by mouth daily as needed for Constipation. Indications: constipation    Historical Provider   doxycycline (VIBRAMYCIN) 100 mg capsule 1 Cap two (2) times a day.  1/13/18   Historical Provider   DOCUSATE SODIUM (DSS PO) Take 2 Caps by mouth two (2) times daily as needed. Historical Provider   insulin lispro (HUMALOG) 100 unit/mL injection 0-5 Units by SubCUTAneous route four (4) times daily. Indications: type 2 diabetes mellitus    Historical Provider   lisinopril (PRINIVIL, ZESTRIL) 5 mg tablet Take 5 mg by mouth daily. Historical Provider   aspirin delayed-release 81 mg tablet Take 81 mg by mouth daily. Historical Provider   oxyCODONE IR (ROXICODONE) 5 mg immediate release tablet Take 1-2 Tabs by mouth every four (4) hours as needed for Pain. Max Daily Amount: 60 mg. 5/11/17   Alvina Roque PA-C   trandolapril (MAVIK) 2 mg tablet Take 2 mg by mouth daily. Indications: hypertension    Historical Provider   ezetimibe (ZETIA) 10 mg tablet Take  by mouth nightly. Historical Provider   rosuvastatin (CRESTOR) 10 mg tablet Take 10 mg by mouth daily. Indications: hyperlipidemia    Historical Provider   pregabalin (LYRICA) 150 mg capsule Take 150 mg by mouth daily. Indications: Diabetic Peripheral Neuropathy    Historical Provider   potassium chloride SR (KLOR-CON 8) 8 mEq tablet Take 8 mEq by mouth daily. Historical Provider   amLODIPine (NORVASC) 5 mg tablet Take 5 mg by mouth daily. Indications: hypertension    Historical Provider   furosemide (LASIX) 20 mg tablet Take 20 mg by mouth daily.  Indications: Edema, h/o cellulitis    Historical Provider       Current Facility-Administered Medications   Medication Dose Route Frequency    potassium chloride (K-DUR, KLOR-CON) SR tablet 40 mEq  40 mEq Oral NOW    sodium chloride (NS) flush 5-10 mL  5-10 mL IntraVENous Q8H    ferrous sulfate tablet 325 mg  1 Tab Oral BID WITH MEALS    docusate sodium (COLACE) capsule 100 mg  100 mg Oral BID    enoxaparin (LOVENOX) injection 40 mg  40 mg SubCUTAneous DAILY    Vancomycin - Pharmacy to dose   1 Each Other Rx Dosing/Monitoring    insulin lispro (HUMALOG) injection   SubCUTAneous AC&HS    famotidine (PEPCID) tablet 20 mg  20 mg Oral BID    cefepime (MAXIPIME) 2 g in sterile water (preservative free) 10 mL IV syringe  2 g IntraVENous Q12H    vancomycin (VANCOCIN) 1,000 mg in 0.9% sodium chloride (MBP/ADV) 250 mL adv  1,000 mg IntraVENous Q12H    traZODone (DESYREL) tablet 50 mg  50 mg Oral QHS    oseltamivir (TAMIFLU) capsule 75 mg  75 mg Oral BID    alfuzosin SR (UROXATRAL) tablet 10 mg  10 mg Oral DAILY    atorvastatin (LIPITOR) tablet 20 mg  20 mg Oral DAILY    bethanechol (URECHOLINE) tablet 25 mg  25 mg Oral TID    0.9% sodium chloride infusion  100 mL/hr IntraVENous CONTINUOUS       Allergy:  Allergies   Allergen Reactions    Oxycodone Other (comments)     :makes him crazy per wife        Social History:  Social History   Substance Use Topics    Smoking status: Never Smoker    Smokeless tobacco: Never Used    Alcohol use No      Comment:          Family History:  History reviewed. No pertinent family history. Objective:   Vital Signs:    Blood pressure 157/59, pulse 77, temperature 97.9 °F (36.6 °C), resp. rate 16, height 5' 5\" (1.651 m), weight 98.7 kg (217 lb 9.5 oz), SpO2 99 %. Body mass index is 36.21 kg/(m^2). O2 Device: Nasal cannula   O2 Flow Rate (L/min): 2 l/min   Temp (24hrs), Av.1 °F (36.7 °C), Min:97.7 °F (36.5 °C), Max:98.6 °F (37 °C)       Patient Vitals for the past 8 hrs:   Temp Pulse Resp BP SpO2   18 1100 97.9 °F (36.6 °C) 77 16 157/59 99 %   18 0755 98.1 °F (36.7 °C) 74 16 134/50 98 %     Intake/Output:   Last shift:      701 - 1900  In: 200 [P.O.:200]  Out: -   Last 3 shifts: 1901 -  0700  In: 7343 [P.O.:840;  I.V.:1585]  Out: 1250 [Urine:1250]    Intake/Output Summary (Last 24 hours) at 18 1241  Last data filed at 18 1225   Gross per 24 hour   Intake              320 ml   Output              500 ml   Net             -180 ml        Physical Exam:   Comfortable; on 2 lits nc; acyanotic  HEENT: pupils not dilated, reactive, no scleral jaundice, dry oral mucosa, no nasal drainage  Neck: No adenopathy or thyroid swelling  CVS: S1S2 no murmurs; JVD not elevated  RS: Mod air entry bilaterally, decreased BS at bases, lungs clear-no wheezes or crackles  Abd: soft, non tender, no hepatosplenomegaly, no distension or guarding or rigidity  Neuro: non focal, awake, alert  Extrm: RT foot edema, RT LE in splint; no cyanosis or clubbing  Skin: no rash  Lymphatic: no cervical or supraclavicular adenopathy  Psych: normal mood      Data:     Recent Results (from the past 24 hour(s))   GLUCOSE, POC    Collection Time: 02/04/18  4:27 PM   Result Value Ref Range    Glucose (POC) 185 (H) 70 - 110 mg/dL   GLUCOSE, POC    Collection Time: 02/04/18  9:33 PM   Result Value Ref Range    Glucose (POC) 98 70 - 033 mg/dL   METABOLIC PANEL, BASIC    Collection Time: 02/05/18  1:12 AM   Result Value Ref Range    Sodium 141 136 - 145 mmol/L    Potassium 3.4 (L) 3.5 - 5.5 mmol/L    Chloride 108 100 - 108 mmol/L    CO2 29 21 - 32 mmol/L    Anion gap 4 3.0 - 18 mmol/L    Glucose 83 74 - 99 mg/dL    BUN 8 7.0 - 18 MG/DL    Creatinine 0.80 0.6 - 1.3 MG/DL    BUN/Creatinine ratio 10 (L) 12 - 20      GFR est AA >60 >60 ml/min/1.73m2    GFR est non-AA >60 >60 ml/min/1.73m2    Calcium 7.6 (L) 8.5 - 10.1 MG/DL   CBC WITH AUTOMATED DIFF    Collection Time: 02/05/18  1:12 AM   Result Value Ref Range    WBC 3.4 (L) 4.6 - 13.2 K/uL    RBC 2.84 (L) 4.70 - 5.50 M/uL    HGB 7.9 (L) 13.0 - 16.0 g/dL    HCT 23.9 (L) 36.0 - 48.0 %    MCV 84.2 74.0 - 97.0 FL    MCH 27.8 24.0 - 34.0 PG    MCHC 33.1 31.0 - 37.0 g/dL    RDW 17.4 (H) 11.6 - 14.5 %    PLATELET 75 (L) 233 - 420 K/uL    MPV 8.9 (L) 9.2 - 11.8 FL    NEUTROPHILS 51 40 - 73 %    LYMPHOCYTES 36 21 - 52 %    MONOCYTES 12 (H) 3 - 10 %    EOSINOPHILS 1 0 - 5 %    BASOPHILS 0 0 - 2 %    ABS. NEUTROPHILS 1.8 1.8 - 8.0 K/UL    ABS. LYMPHOCYTES 1.2 0.9 - 3.6 K/UL    ABS. MONOCYTES 0.4 0.05 - 1.2 K/UL    ABS.  EOSINOPHILS 0.0 0.0 - 0.4 K/UL    ABS. BASOPHILS 0.0 0.0 - 0.06 K/UL    DF AUTOMATED     GLUCOSE, POC    Collection Time: 02/05/18  6:12 AM   Result Value Ref Range    Glucose (POC) 88 70 - 110 mg/dL   GLUCOSE, POC    Collection Time: 02/05/18 11:24 AM   Result Value Ref Range    Glucose (POC) 92 70 - 110 mg/dL           No results for input(s): FIO2I, IFO2, HCO3I, IHCO3, HCOPOC, PCO2I, PCOPOC, IPHI, PHI, PHPOC, PO2I, PO2POC in the last 72 hours.     No lab exists for component: IPOC2    All Micro Results     Procedure Component Value Units Date/Time    CULTURE, BLOOD [[id number]] Collected:  01/31/18 1616    Order Status:  Completed Specimen:  Blood from Blood Updated:  02/05/18 0850     Special Requests: NO SPECIAL REQUESTS        Culture result: NO GROWTH 5 DAYS       CULTURE, TISSUE Srikanth Show STAIN [[id number]] Collected:  02/02/18 0915    Order Status:  Completed Specimen:  Knee  Updated:  02/05/18 0754     Special Requests: RT KNEE SYNOVIAL     GRAM STAIN FEW WBC'S         NO ORGANISMS SEEN        Culture result: NO GROWTH 3 DAYS       CULTURE, TISSUE Ardath Mandril [[id number]] Collected:  02/02/18 0915    Order Status:  Completed Specimen:  Knee  Updated:  02/05/18 0754     Special Requests: NO SPECIAL REQUESTS        GRAM STAIN FEW WBC'S         NO ORGANISMS SEEN        Culture result: NO GROWTH 3 DAYS       CULTURE, Joseph Lapine STAIN [[id number]] Collected:  02/02/18 0915    Order Status:  Completed Specimen:  Wound from Knee  Updated:  02/05/18 0752     Special Requests: NO SPECIAL REQUESTS        GRAM STAIN FEW WBC'S         NO ORGANISMS SEEN        Culture result: NO GROWTH 3 DAYS       CULTURE, BODY FLUID Srikanth Show STAIN [[id number]] Collected:  02/02/18 0915    Order Status:  Completed Specimen:  Joint Fluid Updated:  02/05/18 0751     Special Requests: NO SPECIAL REQUESTS        GRAM STAIN MODERATE WBC'S         NO ORGANISMS SEEN        Culture result: NO GROWTH 3 DAYS       CULTURE, ANAEROBIC [[id number]] Collected:  02/02/18 0915 Order Status:  Completed Specimen:  Knee  Updated:  02/05/18 0706     Special Requests: RT KNEE SYNOVIAL     Culture result:         NO ANAEROBES ISOLATED 3 DAYS    CULTURE, ANAEROBIC [[id number]] Collected:  02/02/18 0915    Order Status:  Completed Specimen:  Knee  Updated:  02/05/18 0706     Special Requests: NO SPECIAL REQUESTS        Culture result:         NO ANAEROBES ISOLATED 3 DAYS    CULTURE, ANAEROBIC [[id number]] Collected:  02/02/18 0915    Order Status:  Completed Specimen:  Knee  Updated:  02/05/18 0705     Special Requests: NO SPECIAL REQUESTS        Culture result:         NO ANAEROBES ISOLATED 3 DAYS    CULTURE, ANAEROBIC [[id number]] Collected:  02/02/18 0915    Order Status:  Completed Specimen:  Knee  Updated:  02/05/18 0705     Special Requests: NO SPECIAL REQUESTS        Culture result:         NO ANAEROBES ISOLATED 3 DAYS    CULTURE, URINE [[id number]] Collected:  01/31/18 1705    Order Status:  Completed Specimen:  Urine from Clean catch Updated:  02/02/18 0901     Special Requests: NO SPECIAL REQUESTS        Culture result: NO GROWTH 2 DAYS       INFLUENZA A & B AG (RAPID TEST) [[id number]]  (Abnormal) Collected:  02/01/18 1554    Order Status:  Completed Specimen:  Nasopharyngeal from Nasal washing Updated:  02/01/18 1623     Influenza A Antigen POSITIVE (A)        Influenza B Antigen NEGATIVE              Telemetry: normal sinus rhythm    ECHO 2/1/18   SUMMARY:  Left ventricle: Systolic function was normal by EF (biplane method of disks).  Ejection fraction was estimated to be 60  % in the range of 55 % to 65 %. There were no regional wall motion abnormalities. Wall thickness was mildly increased. There was mild concentric hypertrophy. Doppler parameters were consistent with abnormal left ventricular relaxation  (grade 1 diastolic dysfunction). Right ventricle: The size was normal. Systolic function was normal. Systolic pressure was mildly increased.  Estimated  peak pressure was 34 mmHg.  Tricuspid valve: There was mild pulmonary hypertension. Inferior vena cava, hepatic veins: The respirophasic change in diameter was less than 50%. INDICATIONS: Syncope. Imaging:  [x]I have personally reviewed the patients chest radiographs images and report   Knee Xray 2/2/18    Results from Hospital Encounter encounter on 01/31/18   XR CHEST PORT   Narrative EXAM: AP radiograph of the chest    INDICATION: Chest pain    COMPARISON: January 31, 2018, November 16, 2017    _______________    FINDINGS:    Normal heart size and mediastinal contour. No consolidation, pleural effusion,  or pneumothorax. No acute osseous abnormalities. Lower cervical anterior fusion. _______________         Impression IMPRESSION:    No acute pulmonary findings               Results from [place] encounter on 01/31/18   CTA CHEST W OR W WO CONT   Narrative EXAM: CTA Chest    INDICATION: Syncopal episode, altered mental status. Sepsis. Dilation for  possible pulmonary embolism. COMPARISON: Chest radiograph of 1/31/2018. No prior cross-sectional imaging of  the chest is available for review. TECHNIQUE: Axial CT imaging from the thoracic inlet through the diaphragm with  intravenous contrast utilizing CTA study for pulmonary artery evaluation. Coronal and sagittal MIP reformations were generated at a separate workstation. One or more dose reduction techniques were used on this CT: automated exposure  control, adjustment of the mAs and/or kVp according to patient's size, and  iterative reconstruction techniques. The specific techniques utilized on this CT  exam have been documented in the patient's electronic medical record.    _______________    FINDINGS:    EXAM QUALITY: Secondary to a combination of bolus timing and respiratory motion,  the examination is only adequate exclusion of large, central pulmonary embolism. PULMONARY ARTERIES: There is no evidence of large, central pulmonary embolism.   The proximal lobar pulmonary arterial branches are patent. Poor pulmonary  arterial opacification as well as respiratory motion artifact results in  inadequate evaluation of the remaining pulmonary arterial tree. The main  pulmonary size is within normal limits. MEDIASTINUM: Cardiac size is normal. Coronary arterial atherosclerotic  calcifications are present. There is no pericardial effusion present. Thoracic  aorta is normal in course and caliber, with scattered atherosclerotic  calcifications demonstrated. LYMPH NODES: There is no supraclavicular, axillary, or mediastinal adenopathy. AIRWAY: Central airways are patent. LUNGS: Evaluation of the lung parenchyma is limited by respiratory motion  artifact. There is no focal pneumonic consolidation demonstrated. PLEURA: No pleural effusion or pneumothorax. UPPER ABDOMEN: Calcified gallstone is demonstrated within a nondistended  gallbladder. A small hiatal hernia is present. .    OTHER: No acute osseous abnormality. Multilevel thoracic spondylosis noted,  with prior posterior decompression involving the lower thoracic spine.    _______________         Impression IMPRESSION:    1. Technically limited examination secondary to combination of contrast bolus  timing and respiratory motion artifact without evidence of large, central  pulmonary embolism. Normal main pulmonary arterial size. 2. No focal pneumonic consolidation. No pleural effusion. 3. Cholelithiasis. 4. Small hiatal hernia.         IMPRESSION:   · Principal Problem:  · Infected prosthetic knee joint ([address] (1/31/2018)  · Hypotension, resolved  · Acute metabolic encephalopthy-post op state, resolved  · Anemia with mild bleeding at surgical site (can't explain the H&H drop), likely dilutional.   ·   · Active Problems:  ·   Diabetes mellitus type 2, controlled ([address] (5/9/2017)  ·   Hyperlipidemia (5/9/2017)  ·   HTN (hypertension) (5/9/2017)  ·   Sepsis ([address] (1/31/2018)  ·   Syncope (1/31/2018)  · Influenza (2/2/2018)   RECOMMENDATIONS:   Resp stable; continue nc O2; incent spirometry; titrate FiO2 for goal SPO2> 92%; consider BIPAP if patient develops CHF from fluid resuscitation. CXR 2/3/18 unremarkable. Antibiotic choice: cefepime and iv vancomycin for infected knee; blood and tissue/wound cx: NGTD  Oral Tamiflu x 5 days for Influenza A positive; droplet precautions  No active pulmonary issues, will sign off. Call us with any questions.       [x]See my orders for details    My assessment, plan of care, findings, medications, side effects etc were discussed with:  [x]nursing []PT/OT    []respiratory therapy []Dr. Kiya Quinn [x]Patient       Lyly Montemayor MD   2/5/2018

## 2018-02-06 LAB
ABO + RH BLD: NORMAL
ANION GAP SERPL CALC-SCNC: 7 MMOL/L (ref 3–18)
BACTERIA SPEC CULT: NORMAL
BASOPHILS # BLD: 0 K/UL (ref 0–0.06)
BASOPHILS NFR BLD: 0 % (ref 0–2)
BLD PROD TYP BPU: NORMAL
BLD PROD TYP BPU: NORMAL
BLOOD GROUP ANTIBODIES SERPL: NORMAL
BPU ID: NORMAL
BPU ID: NORMAL
BUN SERPL-MCNC: 9 MG/DL (ref 7–18)
BUN/CREAT SERPL: 12 (ref 12–20)
CALCIUM SERPL-MCNC: 8.1 MG/DL (ref 8.5–10.1)
CALLED TO:,BCALL1: NORMAL
CHLORIDE SERPL-SCNC: 107 MMOL/L (ref 100–108)
CO2 SERPL-SCNC: 26 MMOL/L (ref 21–32)
CREAT SERPL-MCNC: 0.76 MG/DL (ref 0.6–1.3)
CROSSMATCH RESULT,%XM: NORMAL
CROSSMATCH RESULT,%XM: NORMAL
DIFFERENTIAL METHOD BLD: ABNORMAL
EOSINOPHIL # BLD: 0 K/UL (ref 0–0.4)
EOSINOPHIL NFR BLD: 1 % (ref 0–5)
ERYTHROCYTE [DISTWIDTH] IN BLOOD BY AUTOMATED COUNT: 17.4 % (ref 11.6–14.5)
GLUCOSE BLD STRIP.AUTO-MCNC: 107 MG/DL (ref 70–110)
GLUCOSE BLD STRIP.AUTO-MCNC: 127 MG/DL (ref 70–110)
GLUCOSE BLD STRIP.AUTO-MCNC: 133 MG/DL (ref 70–110)
GLUCOSE BLD STRIP.AUTO-MCNC: 95 MG/DL (ref 70–110)
GLUCOSE SERPL-MCNC: 102 MG/DL (ref 74–99)
HCT VFR BLD AUTO: 22.7 % (ref 36–48)
HGB BLD-MCNC: 7.5 G/DL (ref 13–16)
LYMPHOCYTES # BLD: 1 K/UL (ref 0.9–3.6)
LYMPHOCYTES NFR BLD: 33 % (ref 21–52)
MCH RBC QN AUTO: 27.7 PG (ref 24–34)
MCHC RBC AUTO-ENTMCNC: 33 G/DL (ref 31–37)
MCV RBC AUTO: 83.8 FL (ref 74–97)
MONOCYTES # BLD: 0.4 K/UL (ref 0.05–1.2)
MONOCYTES NFR BLD: 13 % (ref 3–10)
NEUTS SEG # BLD: 1.7 K/UL (ref 1.8–8)
NEUTS SEG NFR BLD: 53 % (ref 40–73)
PLATELET # BLD AUTO: 82 K/UL (ref 135–420)
PMV BLD AUTO: 9 FL (ref 9.2–11.8)
POTASSIUM SERPL-SCNC: 3.2 MMOL/L (ref 3.5–5.5)
RBC # BLD AUTO: 2.71 M/UL (ref 4.7–5.5)
SERVICE CMNT-IMP: NORMAL
SODIUM SERPL-SCNC: 140 MMOL/L (ref 136–145)
SPECIMEN EXP DATE BLD: NORMAL
STATUS OF UNIT,%ST: NORMAL
STATUS OF UNIT,%ST: NORMAL
UNIT DIVISION, %UDIV: 0
UNIT DIVISION, %UDIV: 0
WBC # BLD AUTO: 3.2 K/UL (ref 4.6–13.2)

## 2018-02-06 PROCEDURE — 74011250637 HC RX REV CODE- 250/637: Performed by: HOSPITALIST

## 2018-02-06 PROCEDURE — 86920 COMPATIBILITY TEST SPIN: CPT | Performed by: PHYSICIAN ASSISTANT

## 2018-02-06 PROCEDURE — 80048 BASIC METABOLIC PNL TOTAL CA: CPT | Performed by: PHYSICIAN ASSISTANT

## 2018-02-06 PROCEDURE — 77010033678 HC OXYGEN DAILY

## 2018-02-06 PROCEDURE — P9016 RBC LEUKOCYTES REDUCED: HCPCS | Performed by: PHYSICIAN ASSISTANT

## 2018-02-06 PROCEDURE — 86900 BLOOD TYPING SEROLOGIC ABO: CPT | Performed by: PHYSICIAN ASSISTANT

## 2018-02-06 PROCEDURE — 74011250637 HC RX REV CODE- 250/637: Performed by: PHYSICIAN ASSISTANT

## 2018-02-06 PROCEDURE — 74011250636 HC RX REV CODE- 250/636: Performed by: PHYSICIAN ASSISTANT

## 2018-02-06 PROCEDURE — 74011250636 HC RX REV CODE- 250/636: Performed by: EMERGENCY MEDICINE

## 2018-02-06 PROCEDURE — 74011250637 HC RX REV CODE- 250/637: Performed by: INTERNAL MEDICINE

## 2018-02-06 PROCEDURE — 36430 TRANSFUSION BLD/BLD COMPNT: CPT

## 2018-02-06 PROCEDURE — 36415 COLL VENOUS BLD VENIPUNCTURE: CPT | Performed by: PHYSICIAN ASSISTANT

## 2018-02-06 PROCEDURE — 82962 GLUCOSE BLOOD TEST: CPT

## 2018-02-06 PROCEDURE — 74011000250 HC RX REV CODE- 250: Performed by: EMERGENCY MEDICINE

## 2018-02-06 PROCEDURE — 65660000000 HC RM CCU STEPDOWN

## 2018-02-06 PROCEDURE — 97530 THERAPEUTIC ACTIVITIES: CPT

## 2018-02-06 PROCEDURE — 85025 COMPLETE CBC W/AUTO DIFF WBC: CPT | Performed by: PHYSICIAN ASSISTANT

## 2018-02-06 PROCEDURE — 97116 GAIT TRAINING THERAPY: CPT

## 2018-02-06 RX ORDER — OSELTAMIVIR PHOSPHATE 75 MG/1
75 CAPSULE ORAL 2 TIMES DAILY
Status: DISCONTINUED | OUTPATIENT
Start: 2018-02-06 | End: 2018-02-06

## 2018-02-06 RX ORDER — POTASSIUM CHLORIDE 1.5 G/1.77G
40 POWDER, FOR SOLUTION ORAL 2 TIMES DAILY WITH MEALS
Status: DISCONTINUED | OUTPATIENT
Start: 2018-02-06 | End: 2018-02-07 | Stop reason: HOSPADM

## 2018-02-06 RX ORDER — SODIUM CHLORIDE 9 MG/ML
250 INJECTION, SOLUTION INTRAVENOUS AS NEEDED
Status: DISCONTINUED | OUTPATIENT
Start: 2018-02-06 | End: 2018-02-07 | Stop reason: HOSPADM

## 2018-02-06 RX ADMIN — TRAMADOL HYDROCHLORIDE 50 MG: 50 TABLET, FILM COATED ORAL at 00:08

## 2018-02-06 RX ADMIN — TRAZODONE HYDROCHLORIDE 50 MG: 50 TABLET ORAL at 23:58

## 2018-02-06 RX ADMIN — ALFUZOSIN HYDROCHLORIDE 10 MG: 10 TABLET ORAL at 09:03

## 2018-02-06 RX ADMIN — TRAMADOL HYDROCHLORIDE 50 MG: 50 TABLET, FILM COATED ORAL at 09:03

## 2018-02-06 RX ADMIN — DOCUSATE SODIUM 100 MG: 100 CAPSULE, LIQUID FILLED ORAL at 23:58

## 2018-02-06 RX ADMIN — ENOXAPARIN SODIUM 40 MG: 40 INJECTION SUBCUTANEOUS at 23:58

## 2018-02-06 RX ADMIN — FERROUS SULFATE TAB 325 MG (65 MG ELEMENTAL FE) 325 MG: 325 (65 FE) TAB at 09:03

## 2018-02-06 RX ADMIN — POTASSIUM CHLORIDE 40 MEQ: 1.5 POWDER, FOR SOLUTION ORAL at 09:03

## 2018-02-06 RX ADMIN — BETHANECHOL CHLORIDE 25 MG: 10 TABLET ORAL at 09:30

## 2018-02-06 RX ADMIN — TRAMADOL HYDROCHLORIDE 50 MG: 50 TABLET, FILM COATED ORAL at 16:44

## 2018-02-06 RX ADMIN — Medication 10 ML: at 14:00

## 2018-02-06 RX ADMIN — ATORVASTATIN CALCIUM 20 MG: 20 TABLET, FILM COATED ORAL at 09:03

## 2018-02-06 RX ADMIN — OSELTAMIVIR PHOSPHATE 75 MG: 75 CAPSULE ORAL at 09:02

## 2018-02-06 RX ADMIN — FERROUS SULFATE TAB 325 MG (65 MG ELEMENTAL FE) 325 MG: 325 (65 FE) TAB at 17:46

## 2018-02-06 RX ADMIN — POTASSIUM CHLORIDE 40 MEQ: 1.5 POWDER, FOR SOLUTION ORAL at 17:46

## 2018-02-06 RX ADMIN — SODIUM CHLORIDE 1000 MG: 900 INJECTION, SOLUTION INTRAVENOUS at 06:33

## 2018-02-06 RX ADMIN — WATER 2 G: 1 INJECTION INTRAMUSCULAR; INTRAVENOUS; SUBCUTANEOUS at 12:12

## 2018-02-06 RX ADMIN — BETHANECHOL CHLORIDE 25 MG: 10 TABLET ORAL at 15:13

## 2018-02-06 RX ADMIN — SODIUM CHLORIDE 1000 MG: 900 INJECTION, SOLUTION INTRAVENOUS at 17:46

## 2018-02-06 RX ADMIN — WATER 2 G: 1 INJECTION INTRAMUSCULAR; INTRAVENOUS; SUBCUTANEOUS at 00:01

## 2018-02-06 RX ADMIN — FAMOTIDINE 20 MG: 20 TABLET, FILM COATED ORAL at 23:58

## 2018-02-06 RX ADMIN — FAMOTIDINE 20 MG: 20 TABLET, FILM COATED ORAL at 09:02

## 2018-02-06 NOTE — PROGRESS NOTES
Discharge plan: Discharge tomorrow (2/7/18) to rehab ([place] [facility] [address])  [place] [facility], [place], [address] Report to [phone]. Fax [phone]    Please include all hard scripts for controlled substances, med rec and dc summary in packet. Please medicate for pain prior to dc if possible and needed to help offset delay when patient first arrives to facility. CM discuss medical transport with patient's wife, informed her we can arrange medical transport; however, we cannot guarantee insurance payment. She verbalized understanding. [address]ale [address] and left message for Nichole at [facility] to inform her pt should be ready to dc after transfusion around 1600.      1520  Spoke with Christiana Sigala, Primary RN. Pt still getting blood transfusion and it would not be done until after 4p. Called Nichole, Director of Nursing at [facility] and informed her. She is requesting pt come tomorrow am.  Jennifer RICHTER made aware.   Per Vickey Schmitz in PT, pt will need to go by medical transport

## 2018-02-06 NOTE — ROUTINE PROCESS
Bedside and Verbal shift change report given to Prasanth Wood (oncoming nurse) by Devin Melara RN (offgoing nurse). Report included the following information SBAR, Kardex, Intake/Output and MAR.

## 2018-02-06 NOTE — DISCHARGE SUMMARY
Discharge Summary    Patient: Jonathan Pierre MRN: [id number]  CSN: [id number]    YOB: 1933  Age: 80 y.o. Sex: male    DOA: 1/31/2018 LOS:  LOS: 7 days   Discharge Date: 2/7/2018     Primary Care Provider:  Ishan Crabtree MD    Admission Diagnoses: Sepsis [facility])  COMPLICATION FROM PROSTHETIC DEVICE RIGHT KNEE    Discharge Diagnoses:    Problem List as of 2/7/2018  Date Reviewed: 2/5/2018          Codes Class Noted - Resolved    Influenza ICD-10-CM: J11.1  ICD-9-CM: 487.1  2/2/2018 - Present        Sepsis ([facility] ICD-10-CM: A41.9  ICD-9-CM: 038.9, 995.91  1/31/2018 - Present        * (Principal)Infected prosthetic knee joint ([facility] ICD-10-CM: T84.59XA, B78.996  ICD-9-CM: 996.66, V43.65  1/31/2018 - Present        Syncope ICD-10-CM: R55  ICD-9-CM: 780.2  1/31/2018 - Present        Diabetes mellitus type 2, controlled ([address] (Chronic) ICD-10-CM: E11.9  ICD-9-CM: 250.00  5/9/2017 - Present        Hyperlipidemia (Chronic) ICD-10-CM: E78.5  ICD-9-CM: 272.4  5/9/2017 - Present        HTN (hypertension) (Chronic) ICD-10-CM: I10  ICD-9-CM: 401.9  5/9/2017 - Present        Rupture of right quadriceps tendon (Chronic) ICD-10-CM: Y88.363C  ICD-9-CM: 843.8  5/9/2017 - Present              Discharge Medications:     Discharge Medication List as of 2/5/2018  1:29 PM      START taking these medications    Details   ferrous sulfate 325 mg (65 mg iron) tablet Take 1 Tab by mouth two (2) times daily (with meals). , Print, Disp-60 Tab, R-0         CONTINUE these medications which have CHANGED    Details   docusate sodium (COLACE) 100 mg capsule Take 1 Cap by mouth two (2) times a day for 30 days. , Print, Disp-60 Cap, R-0      enoxaparin (LOVENOX) 40 mg/0.4 mL 0.4 mL by SubCUTAneous route daily. , No PrintDo not fill if patient already has at home. Disp-21 Syringe, R-0      traMADol (ULTRAM) 50 mg tablet Take 1 Tab by mouth every six (6) hours as needed for Pain. Max Daily Amount: 200 mg.  Indications: NEUROPATHIC PAIN, Print, Disp-54 Tab, R-0         CONTINUE these medications which have NOT CHANGED    Details   alfuzosin SR (UROXATRAL) 10 mg SR tablet Take 10 mg by mouth daily. Indications: Urolithiasis, Historical Med      atorvastatin (LIPITOR) 20 mg tablet Take 20 mg by mouth daily. , Historical Med      bethanechol (URECHOLINE) 25 mg tablet Take 25 mg by mouth three (3) times daily. , Historical Med      bisacodyl (DULCOLAX, BISACODYL,) 5 mg EC tablet Take 10 mg by mouth daily as needed for Constipation. Indications: constipation, Historical Med      insulin lispro (HUMALOG) 100 unit/mL injection 0-5 Units by SubCUTAneous route four (4) times daily. Indications: type 2 diabetes mellitus, Historical Med      lisinopril (PRINIVIL, ZESTRIL) 5 mg tablet Take 5 mg by mouth daily. , Historical Med      doxycycline (VIBRAMYCIN) 100 mg capsule 1 Cap two (2) times a day., Historical Med      aspirin delayed-release 81 mg tablet Take 81 mg by mouth daily. , Historical Med      oxyCODONE IR (ROXICODONE) 5 mg immediate release tablet Take 1-2 Tabs by mouth every four (4) hours as needed for Pain. Max Daily Amount: 60 mg., No Print, Disp-60 Tab, R-0      trandolapril (MAVIK) 2 mg tablet Take 2 mg by mouth daily. Indications: hypertension, Historical Med      ezetimibe (ZETIA) 10 mg tablet Take  by mouth nightly., Historical Med      pregabalin (LYRICA) 150 mg capsule Take 150 mg by mouth daily. Indications: Diabetic Peripheral Neuropathy, Historical Med      potassium chloride SR (KLOR-CON 8) 8 mEq tablet Take 8 mEq by mouth daily. , Historical Med      amLODIPine (NORVASC) 5 mg tablet Take 5 mg by mouth daily. Indications: hypertension, Historical Med      furosemide (LASIX) 20 mg tablet Take 20 mg by mouth daily. Indications: Edema, h/o cellulitis, Historical Med      rosuvastatin (CRESTOR) 10 mg tablet Take 10 mg by mouth daily.  Indications: hyperlipidemia, Historical Med             NEW MEDICATIONS UPON DISCHARGE:  -Cefepime 2g IV q12h for 38 days for arthroplasty infection.  -Vancomycin 1000mg IV q12h for 38 days for arthroplasty infection. Discharge Condition: Stable    Procedures : I&D, Washout of Right Prosthetic Knee Joint    Consults: Orthopedics and Pulmonary/Critical Care      PHYSICAL EXAM    Visit Vitals    /70 (BP 1 Location: Left arm, BP Patient Position: At rest)    Pulse 74    Temp 97.9 °F (36.6 °C)    Resp 18    Ht 5' 5\" (1.651 m)    Wt 92.6 kg (204 lb 3.2 oz)    SpO2 98%    BMI 33.98 kg/m2     General: Awake, cooperative, no acute distress    HEENT: NC, Atraumatic. PERRLA, EOMI. Anicteric sclerae. Lungs:  CTA Bilaterally. No Wheezing/Rhonchi/Rales. Heart:  Regular  rhythm,  No murmur, No Rubs, No Gallops  Abdomen: Soft, Non distended, Non tender. +Bowel sounds,   Extremities: No c/c/e. Right knee in immobilizer. Psych:   Not anxious or agitated. Neurologic:  No acute neurological deficits. Admission HPI : Erick Silver is a 80 y.o. male who ws referred to the ER from preop testing. Had an episode where he passed out briefly. No fall. Was sitting in a chair. Went blank and was briefly unresponsive. No seizure activity. No hx of prior episodes like this. Brought to ER. In ER manifested a temperature of 103f and had some tachycardia. Currently has a right knee prosthetic joint. He is thought to have an infected joint. He was going to preop testing to get ready for removal of the hardware and placement of an antibiotic spacer. Follows with angeline Oliveros. Hospital Course : This patient was admitted to medical services on January 31, 2018 for sepsis secondary to an infected right prosthetic knee joint. He was initiated on cefepime and vancomycin, given fluids in the ED. Orthopedics saw the patient was well, as he was originally scheduled for a procedure prior to being admitted.  The patient's wife requested that the patient be tested for the flu as he was admitted from a nursing facility, and the patient tested positive for influenza A, and started on Tamiflu. He underwent the procedure, and had some issues in the post op phase with anesthesia and became delirious. The patient was transferred to the ICU for one night to monitor given his advanced age, flu positive, and sepsis. He was subsequently transferred back to the telemetry unit and the remainder of his hospitalization was without issue. He worked with PT who recommended SNF placement. During his stay, we asked that Dr Louise Marte, infectious diseases, see the patient and assist in managing antibiotic selection. So far, the cultures have remained negative to date. He will be discharged on the antibiotic recommendations of Dr Louise Marte, which are cefepime and vancomycin for 6 weeks. Activity: PT/OT Eval and Treat    Diet: Diabetic Diet    Follow-up: This patient was instructed to follow up with his PCP upon discharge. He was also instructed to follow up with Dr Andrea Moreno upon discharge. Disposition: This patient will be discharged to SNF in stable condition. He underwent a surgical washout of his infected R prosthetic knee joint with Dr Andrea Moreno. Spent a brief period in the ICU for post op delirium, but was subsequently transferred back to the telemetry unit without issue. Minutes spent on discharge: 38       Labs: Results:       Chemistry Recent Labs      02/07/18   0447  02/06/18   0343   GLU  106*  102*   NA  143  140   K  3.4*  3.2*   CL  107  107   CO2  28  26   BUN  9  9   CREA  0.85  0.76   CA  8.4*  8.1*   AGAP  8  7   BUCR  11*  12      CBC w/Diff Recent Labs      02/07/18 0447  02/06/18   0343   WBC  3.6*  3.2*   RBC  3.04*  2.71*   HGB  8.3*  7.5*   HCT  25.5*  22.7*   PLT  94*  82*   GRANS  47  53   LYMPH  36  33   EOS  2  1      Cardiac Enzymes No results for input(s): CPK, CKND1, SREE in the last 72 hours.     No lab exists for component: CKRMB, TROIP   Coagulation No results for input(s): PTP, INR, APTT in the last 72 hours.    No lab exists for component: INREXT, INREXT    Lipid Panel No results found for: CHOL, CHOLPOCT, CHOLX, CHLST, CHOLV, 911465, HDL, LDL, LDLC, DLDLP, 947579, VLDLC, VLDL, TGLX, TRIGL, TRIGP, TGLPOCT, CHHD, CHHDX   BNP No results for input(s): BNPP in the last 72 hours. Liver Enzymes No results for input(s): TP, ALB, TBIL, AP, SGOT, GPT in the last 72 hours. No lab exists for component: DBIL   Thyroid Studies No results found for: T4, T3U, TSH, TSHEXT, TSHEXT         Significant Diagnostic Studies: Xr Knee Rt Max 2 Vws    Result Date: 2/2/2018  PORTABLE 2 VIEW RIGHT KNEE: COMPARISON study from 3/22/2011. Interval revision of the total knee prosthesis with a longer femoral and tibial stems. The appearance of the prosthesis is somewhat unusual, presumed antibiotic spacers. No fractures. Postsurgical changes noted in the soft tissues. Impression: Interval revision of the total knee prosthesis, in satisfactory alignment, with presumed antibiotic spacers. Postsurgical changes in the soft tissues. Ct Head Wo Cont    Result Date: 1/31/2018  EXAM: CT head INDICATION: Transient altered mental status, code S. COMPARISON: None. TECHNIQUE: Axial CT imaging of the head was performed without intravenous contrast. Coronal and sagittal reconstructions were obtained. Dose reduction: One or more dose reduction techniques were used on this CT: automated exposure control, adjustment of the mAs and/or kVp according to patient's size, and iterative reconstruction techniques. The specific techniques utilized on this CT exam have been documented in the patient's electronic medical record. _______________ FINDINGS: BRAIN PARENCHYMA: There is no evidence of acute intracranial hemorrhage, mass effect, midline shift, or herniation. No definite CT evidence of acute cortical infarct is seen. Patchy periventricular white matter low density changes are compatible with areas of chronic ischemia and microinfarcts.  Mild vascular calcifications are present. VENTRICLES/EXTRA-AXIAL SPACES/MENINGES: There is mild diffuse atrophy. OSSEOUS STRUCTURES: No fracture is seen. PARANASAL SINUSES/MASTOIDS: Visualized paranasal sinuses and mastoid air cells are clear. ORBITS: The visualized orbits are unremarkable. OTHER: None.  _______________     IMPRESSION: Evidence of atrophy and chronic microvascular changes mainly in the deep white matter. No evidence of an acute intracranial process seen. No hemorrhage, definite acute infarct, or mass effect noted. The findings were called to Dr. Kate Wood on 1/31/2018 at [phone] by Dr. Arlette Delacruz. Cta Chest W Or W Wo Cont    Result Date: 2/1/2018  EXAM: CTA Chest INDICATION: Syncopal episode, altered mental status. Sepsis. Dilation for possible pulmonary embolism. COMPARISON: Chest radiograph of 1/31/2018. No prior cross-sectional imaging of the chest is available for review. TECHNIQUE: Axial CT imaging from the thoracic inlet through the diaphragm with intravenous contrast utilizing CTA study for pulmonary artery evaluation. Coronal and sagittal MIP reformations were generated at a separate workstation. One or more dose reduction techniques were used on this CT: automated exposure control, adjustment of the mAs and/or kVp according to patient's size, and iterative reconstruction techniques. The specific techniques utilized on this CT exam have been documented in the patient's electronic medical record. _______________ FINDINGS: EXAM QUALITY: Secondary to a combination of bolus timing and respiratory motion, the examination is only adequate exclusion of large, central pulmonary embolism. PULMONARY ARTERIES: There is no evidence of large, central pulmonary embolism. The proximal lobar pulmonary arterial branches are patent. Poor pulmonary arterial opacification as well as respiratory motion artifact results in inadequate evaluation of the remaining pulmonary arterial tree.  The main pulmonary size is within normal limits. MEDIASTINUM: Cardiac size is normal. Coronary arterial atherosclerotic calcifications are present. There is no pericardial effusion present. Thoracic aorta is normal in course and caliber, with scattered atherosclerotic calcifications demonstrated. LYMPH NODES: There is no supraclavicular, axillary, or mediastinal adenopathy. AIRWAY: Central airways are patent. LUNGS: Evaluation of the lung parenchyma is limited by respiratory motion artifact. There is no focal pneumonic consolidation demonstrated. PLEURA: No pleural effusion or pneumothorax. UPPER ABDOMEN: Calcified gallstone is demonstrated within a nondistended gallbladder. A small hiatal hernia is present. . OTHER: No acute osseous abnormality. Multilevel thoracic spondylosis noted, with prior posterior decompression involving the lower thoracic spine. _______________     IMPRESSION: 1. Technically limited examination secondary to combination of contrast bolus timing and respiratory motion artifact without evidence of large, central pulmonary embolism. Normal main pulmonary arterial size. 2. No focal pneumonic consolidation. No pleural effusion. 3. Cholelithiasis. 4. Small hiatal hernia. Xr Chest Port    Result Date: 2/3/2018  EXAM: AP radiograph of the chest INDICATION: Chest pain COMPARISON: January 31, 2018, November 16, 2017 _______________ FINDINGS: Normal heart size and mediastinal contour. No consolidation, pleural effusion, or pneumothorax. No acute osseous abnormalities. Lower cervical anterior fusion. _______________     IMPRESSION: No acute pulmonary findings     Xr Chest Port    Result Date: 1/31/2018  Chest, single view Indication: Altered mental status. Suspected stroke. Comparison: Several prior exams, most recently 11/16/2017 Findings:  Portable upright AP view of the chest was obtained. The lungs are mildly underexpanded but clear. There is no focal pneumonic consolidation. No pneumothorax or pleural effusion.  Cardiac size and mediastinal contours are normal. There is no acute osseous abnormality     Impression: Mildly underexpanded lungs without superimposed acute radiographic abnormality. Ir Fluoro Guide Picc Insertion    Result Date: 2/5/2018  EXAM: PICC Placement. CLINICAL: Right knee. Prosthetic infection, requiring long-term IV antibiotic therapy The procedure was performed following standard maximal barrier technique which includes cap, mask, sterile gown, sterile gloves, sterile full body drape, hand hygiene with alcohol foam, and 2% chlorhexidine for cutaneous antisepsis. Sterile ultrasound gel and a sterile cover for the ultrasound probe was used. Fluoro dose (reference air kerma): 6  mGy. Time Out: 0 838 Start Time: 0 839 Stop Time: 0 847 Procedure, risks, benefits, and alternatives were discussed with the patient and written, informed consent was obtained. Patient's right  arm was prepped. Timeout was observed. Lidocaine was used for local anesthesia. Using direct ultrasound visualization a 21 gauge needle was inserted into the right basilic vein which was patent. Permanent ultrasound recording placed in patient's chart. The guide wire was inserted and advanced under fluoroscopic guidance to the caval atrial junction. The distance between the caval atrial junction and skin puncture site was measured and a PICC line was cut to 39 cm length. The five [employer] Cris double lumen PICC was inserted under fluoroscopic guidance. A frontal chest image was obtained to document catheter position, in the distal one third of the SVC at the cavo-atrial junction. The catheter was flushed with heparinized saline and a sterile dressing applied. GUIDANCE:  Ultrasound and fluoroscopy guidance was used to position (and confirm the position of) the needle / catheter. Image(s) saved in PACS: Ultrasound and fluoroscopy Findings: The right  basilic vein was patent demonstrating normal grayscale appearance and compression.   The tip of the PICC line is in good position in the distal one third SVC. Both lumens flushed easily and there is good blood return. Catheter length is 39 cm. IMPRESSION: Successful ultrasound and fluoroscopic guided insertion of a non-tunneled double lumen power PICC line via the right  arm. PICC is ready for immediate use. No results found for this or any previous visit.         CC: Barbara Richard MD

## 2018-02-06 NOTE — PROGRESS NOTES
Progress Note      Patient: Christina Walker               Sex: male          DOA: 1/31/2018       YOB: 1933      Age:  80 y.o.        LOS:  LOS: 6 days     Status Post: Procedure(s):  EXPLANT EJ PROSTHETIC RIGHT KNEE, SPACER INSERTION, INCISION AND DRAINAGE RIGHT KNEE, PATIENT IS IN ROOM 346  Surgery Date: 2/2/2018            Subjective:     Christina Walker is a 80 y.o. male who c/o right knee pain. Stable, no changes. Objective:      Visit Vitals    /71 (BP 1 Location: Left arm, BP Patient Position: At rest)    Pulse 80    Temp 98.4 °F (36.9 °C)    Resp 18    Ht 5' 5\" (1.651 m)    Wt 92.8 kg (204 lb 8 oz)    SpO2 98%    BMI 34.03 kg/m2       Physical Exam:   Dressing:  clean, dry, intact  Wiggles Toes/Ankle  Foot sensation intact to light touch  No foot edema/ +1 Posterior Tibial Pulse    Intake and Output:  Current Shift:     Last three shifts:  02/04 1901 - 02/06 0700  In: 500 [P.O.:500]  Out: 300 [Urine:300]  Voiding Status:  +void without need for siu catheter    Lab/Data Reviewed:  Recent Labs      02/06/18   0343   HGB  7.5*   HCT  22.7*   NA  140   K  3.2*   CL  107   CO2  26   BUN  9   CREA  0.76   GLU  102*       Medications Reviewed    Assessment/Plan     Principal Problem:    Infected prosthetic knee joint ([facility] (1/31/2018)    Active Problems:    Diabetes mellitus type 2, controlled ([address] (5/9/2017)      Hyperlipidemia (5/9/2017)      HTN (hypertension) (5/9/2017)      Sepsis ([facility] (1/31/2018)      Syncope (1/31/2018)      Influenza (2/2/2018)        · D/C to rehab when cleared by ID, hospitalist  · Continue DVT Prophylaxis. · Continue abx per ID, pending final culture results, all cultures negative to date. · Continue PT and ROM                The patient was seen and examined by Dr. Nadira aLfleur today as well and he is in agreement with above.

## 2018-02-06 NOTE — PROGRESS NOTES
Problem: Mobility Impaired (Adult and Pediatric)  Goal: *Acute Goals and Plan of Care (Insert Text)  Physical Therapy Goals  Initiated 2/4/2018 and to be accomplished within 3-7 day(s)  1. Patient will move from supine to sit and sit to supine  in bed with supervision/set-up. 2.  Patient will transfer from bed to chair and chair to bed with supervision/set-up using the least restrictive device. 3.  Patient will perform sit to stand with supervision/set-up. 4.  Patient will ambulate with minimal assistance/contact guard assist for 50 feet with the least restrictive device. Outcome: Not Progressing Towards Goal  PT session held due to:  []  Nausea/vomiting  []  RN Communication/ suggestion  []  Extreme Pain  [x]  Blood transfusion in progress. Will f/u later as pt's schedule allows. Thank you.   Li Dee, PTA

## 2018-02-06 NOTE — PROGRESS NOTES
Problem: Mobility Impaired (Adult and Pediatric)  Goal: *Acute Goals and Plan of Care (Insert Text)  Physical Therapy Goals  Initiated 2/4/2018 and to be accomplished within 3-7 day(s)  1. Patient will move from supine to sit and sit to supine  in bed with supervision/set-up. 2.  Patient will transfer from bed to chair and chair to bed with supervision/set-up using the least restrictive device. 3.  Patient will perform sit to stand with supervision/set-up. 4.  Patient will ambulate with minimal assistance/contact guard assist for 50 feet with the least restrictive device. Outcome: Progressing Towards Goal  physical Therapy TREATMENT    Patient: Jonathan Pierre (1 y.o. male)  Date: 2/5/2018  Diagnosis: Sepsis ([address]  COMPLICATION FROM PROSTHETIC DEVICE RIGHT KNEE Infected prosthetic knee joint ([facility])  Procedure(s) (LRB):  EXPLANT EJ PROSTHETIC RIGHT KNEE, SPACER INSERTION, INCISION AND DRAINAGE RIGHT KNEE, PATIENT IS IN ROOM 346 (Right) 3 Days Post-Op  Precautions: Fall, PWB, Other (comment) (50% wt/b; KI)   Chart, physical therapy assessment, plan of care and goals were reviewed. ASSESSMENT:  Pt is willing to participate, but not providing full effort and quits easily. Pt able to complete 1 standing trials with max assistance. Pt lacks UE strength needed for ambulation. Progression toward goals:  []      Improving appropriately and progressing toward goals  [x]      Improving slowly and progressing toward goals  []      Not making progress toward goals and plan of care will be adjusted     PLAN:  Patient continues to benefit from skilled intervention to address the above impairments. Continue treatment per established plan of care. Discharge Recommendations:  Tai Arellano  Further Equipment Recommendations for Discharge:  hospital bed, mechanical lift and rolling walker     SUBJECTIVE:   Patient stated I need to be cleaned up.     OBJECTIVE DATA SUMMARY:   Critical Behavior:  Neurologic State: Confused  Orientation Level: Disoriented to situation, Oriented to person, Oriented to place, Oriented to time  Cognition: Decreased attention/concentration, Decreased command following, Impaired decision making, Memory loss, Poor safety awareness  Safety/Judgement: Awareness of environment, Decreased awareness of need for assistance, Decreased awareness of need for safety, Lack of insight into deficits  Functional Mobility Training:  Bed Mobility:  Supine to Sit: Maximum assistance  Sit to Supine: Maximum assistance  Scooting: Maximum assistance    Transfers:  Sit to Stand: Maximum assistance;Assist x1;Assist x2  Stand to Sit: Maximum assistance;Assist x1;Assist x2  Balance:  Sitting: Intact  Standing: Impaired; With support  Pain:  Pain in: 6  Pain out: 7  Activity Tolerance:   Fair-  Please refer to the flowsheet for vital signs taken during this treatment.   After treatment:   [] Patient left in no apparent distress sitting up in chair  [x] Patient left in no apparent distress in bed  [x] Call bell left within reach  [x] Nursing notified  [] Caregiver present  [] Bed alarm activated      Jose Redd PTA   Time Calculation: 45 mins  `

## 2018-02-06 NOTE — PROGRESS NOTES
800 assumed care of patient from Eze Garcia RN    197 Assessment completed, patient is alert and oriented x's 4, c/o right knee pain. PRN pain medication administered per patient's request without any complications. NSR on the monitor with a HR in the 70's. Lung fields consists of crackles, but does clear with nonproductive cough, 02 sat sustained in the upper 90's. Patient is tolerating diabetic diet without any N/V or gastric distention, voiding clear yellow urine without any complications. Right knee incision remain intact with ace wrap. Scheduled medications administered as ordered without any complications. Patient is currently resting quietly in bed, no s/s of any distress, VSS, call bell and personal belongings within reach, will continue to monitor    1100 NAD, will continue to monitor. 1218 Scheduled medications administered as ordered without any complications. Patient is currently resting quietly in bed, no s/s of any pain or distress, will continue to monitor. 1411 Initiated blood transfusion without any s/s of any reaction. Patient is resting quietly in bed with spouse at bedside, will continue to monitor    1515 Scheduled medications administered as ordered without any complications. Patient is currently resting in bed with spouse at bedside, no s/s of any pain or distress, will continue to monitor. 1645 PRN pain medication administered per patient's request without any complications. Patient made aware of PT session, understanding verbalized, family at bedside, will continue to monitor    9416 5007 Scheduled medications administered as ordered without any complications. Patient received spa treatment and completed physical therapy without any complications.  Dr. Dhaval Bang in to assess patient, made aware of patient's anticipated discharge to rehab 2/7/2018 in the am, no s/s of any pain or distress, will continue to monitor    2015 Bedside and Verbal shift change report given to Bertha Jalloh RN (oncoming nurse) by Darnella Meckel, RN (offgoing nurse). Report included the following information SBAR.

## 2018-02-06 NOTE — PROGRESS NOTES
1033-Alert and oriented, with confusion. Sleeping on and off, but responds to voice. Lungs CTA, but diminished. On tele box 10 showing NSR. BS active x 4 quads. Ace wrap to right leg, CDI. SCD to left leg. Right arm with double lumen PICC line in place, 18 gauge to left hand, 20 gauge to left AC, 20 gauge to right hand. SOB with exertion. Shift summary- Worked with PT. Denied pain. Alert and oriented with some confusion. Tolerated some of meals. Voiding without difficulty.

## 2018-02-06 NOTE — DISCHARGE SUMMARY
DISCHARGE SUMMARY    Patient: Erick Silver MRN: [id number]  CSN: [id number]    YOB: 1933  Age: 80 y.o. Sex: male              Admit Date: 1/31/2018    Discharge Date:     Admission Diagnoses: Sepsis ([facility]  COMPLICATION FROM PROSTHETIC DEVICE RIGHT KNEE    Discharge Diagnoses:    Problem List as of 2/6/2018  Date Reviewed: 2/5/2018          Codes Class Noted - Resolved    Influenza ICD-10-CM: J11.1  ICD-9-CM: 487.1  2/2/2018 - Present        Sepsis ([facility] ICD-10-CM: A41.9  ICD-9-CM: 038.9, 995.91  1/31/2018 - Present        * (Principal)Infected prosthetic knee joint ([facility] ICD-10-CM: T84.59XA, I62.819  ICD-9-CM: 996.66, V43.65  1/31/2018 - Present        Syncope ICD-10-CM: R55  ICD-9-CM: 780.2  1/31/2018 - Present        Diabetes mellitus type 2, controlled ([facility] (Chronic) ICD-10-CM: E11.9  ICD-9-CM: 250.00  5/9/2017 - Present        Hyperlipidemia (Chronic) ICD-10-CM: E78.5  ICD-9-CM: 272.4  5/9/2017 - Present        HTN (hypertension) (Chronic) ICD-10-CM: I10  ICD-9-CM: 401.9  5/9/2017 - Present        Rupture of right quadriceps tendon (Chronic) ICD-10-CM: C46.245K  ICD-9-CM: 843.8  5/9/2017 - Present              Discharge Condition: Good    Hospital Course: On the day of admission patient was admitted for fever and AMS while in pre-op testing. Workup done for concern of sepsis, patient tested postiive for Influenza A. The patient underwent a Right TKA explantation, antibiotic spacer and I&D washout that was done under general anesthesia without complications. The patient was started on Lovenox 40 mg SQ daily for anti embolism prophylaxis. The patient was voiding spontaneously without need for a Cox catheter. The patient was also started preoperatively on vancomycin and cefipime for infected knee and concern for sepsis. Patient transferred to ICU after surgery for confusion and hypotension. Physical Therapy was begun postoperatively with patient weight bearing as tolerated.  On POD # 1, the patient was transfused with 2 units of blood for acute blood loss anemia. Patient was found to be stable and transferred to medical floor later in day. Patient's dressing was clean, dry and intact. Patient was able to wiggle their toes and ankle with sensation intact to light touch. The patient had no edema with a present 1+ posterior tibialis pulse. On POD #2 patient's physical exam was unchanged. Patient's incision was examined and looked good without signs or symptoms of infection. Patient's dressing was reapplied. On POD #3 patient's physical exam was unchanged. On POD #4 as long as they are orthopaedically and medically stable they will discharged to Rehab. Patient's hemoglobins were    Recent Labs      02/06/18   0343  02/05/18   0112  02/04/18   0334  02/03/18   1400   HGB  7.5*  7.9*  8.5*  8.8*   . Patient temp max was. Temp (72hrs), Av.3 °F (36.8 °C), Min:97.6 °F (36.4 °C), Max:98.8 °F (37.1 °C)  . Discharge Medications:     Current Discharge Medication List      START taking these medications    Details   ferrous sulfate 325 mg (65 mg iron) tablet Take 1 Tab by mouth two (2) times daily (with meals). Qty: 60 Tab, Refills: 0         CONTINUE these medications which have CHANGED    Details   docusate sodium (COLACE) 100 mg capsule Take 1 Cap by mouth two (2) times a day for 30 days. Qty: 60 Cap, Refills: 0      enoxaparin (LOVENOX) 40 mg/0.4 mL 0.4 mL by SubCUTAneous route daily. Qty: 21 Syringe, Refills: 0    Comments: Do not fill if patient already has at home. traMADol (ULTRAM) 50 mg tablet Take 1 Tab by mouth every six (6) hours as needed for Pain. Max Daily Amount: 200 mg.  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 Indications: Urolithiasis      atorvastatin (LIPITOR) 20 mg tablet Take 20 mg by mouth daily. bethanechol (URECHOLINE) 25 mg tablet Take 25 mg by mouth three (3) times daily. bisacodyl (DULCOLAX, BISACODYL,) 5 mg EC tablet Take 10 mg by mouth daily as needed for Constipation. Indications: constipation      doxycycline (VIBRAMYCIN) 100 mg capsule 1 Cap two (2) times a day. insulin lispro (HUMALOG) 100 unit/mL injection 0-5 Units by SubCUTAneous route four (4) times daily. Indications: type 2 diabetes mellitus      lisinopril (PRINIVIL, ZESTRIL) 5 mg tablet Take 5 mg by mouth daily. aspirin delayed-release 81 mg tablet Take 81 mg by mouth daily. oxyCODONE IR (ROXICODONE) 5 mg immediate release tablet Take 1-2 Tabs by mouth every four (4) hours as needed for Pain. Max Daily Amount: 60 mg.  Qty: 60 Tab, Refills: 0      trandolapril (MAVIK) 2 mg tablet Take 2 mg by mouth daily. Indications: hypertension      ezetimibe (ZETIA) 10 mg tablet Take  by mouth nightly. rosuvastatin (CRESTOR) 10 mg tablet Take 10 mg by mouth daily. Indications: hyperlipidemia      pregabalin (LYRICA) 150 mg capsule Take 150 mg by mouth daily. Indications: Diabetic Peripheral Neuropathy      potassium chloride SR (KLOR-CON 8) 8 mEq tablet Take 8 mEq by mouth daily. amLODIPine (NORVASC) 5 mg tablet Take 5 mg by mouth daily. Indications: hypertension      furosemide (LASIX) 20 mg tablet Take 20 mg by mouth daily.  Indications: Edema, h/o cellulitis             Activity: Activity as tolerated, No heavy lifting, pushing, pulling with the implant side for 2 months, No driving while on analgesics and PT/OT Eval and Treat    Diet: Diabetic Diet    Wound Care: Keep wound clean and dry, Reinforce dressing PRN and Ice to area for comfort    Follow-up: 2 weeks post-op in the office with Dr. Malik Lemus for X-rays of the Right knee and another clinical exam.

## 2018-02-06 NOTE — ROUTINE PROCESS
Bedside and Verbal shift change report given to Everardo Magana (oncoming nurse) by Jad Gonzalez RN   (offgoing nurse). Report included the following information SBAR, Kardex, Intake/Output, MAR and Recent Results.

## 2018-02-06 NOTE — PROGRESS NOTES
Problem: Mobility Impaired (Adult and Pediatric)  Goal: *Acute Goals and Plan of Care (Insert Text)  Physical Therapy Goals  Initiated 2/4/2018 and to be accomplished within 3-7 day(s)  1. Patient will move from supine to sit and sit to supine  in bed with supervision/set-up. 2.  Patient will transfer from bed to chair and chair to bed with supervision/set-up using the least restrictive device. 3.  Patient will perform sit to stand with supervision/set-up. 4.  Patient will ambulate with minimal assistance/contact guard assist for 50 feet with the least restrictive device. Outcome: Progressing Towards Goal  physical Therapy TREATMENT    Patient: Chencho Barrera (42 y.o. male)  Date: 2/6/2018  Diagnosis: Sepsis ([address]  COMPLICATION FROM PROSTHETIC DEVICE RIGHT KNEE Infected prosthetic knee joint ([facility])  Procedure(s) (LRB):  EXPLANT EJ PROSTHETIC RIGHT KNEE, SPACER INSERTION, INCISION AND DRAINAGE RIGHT KNEE, PATIENT IS IN ROOM 346 (Right) 4 Days Post-Op  Precautions: Fall, PWB, Other (comment) (50% wt/b; KI)   Chart, physical therapy assessment, plan of care and goals were reviewed. ASSESSMENT:  Pt is more alert and participatory on this session. Pt perform 3 standing trials and attempt side step. Pt still unable to load UEs well enough for forward amb. Pt is still resistant to idea of LTAC placement. Progression toward goals:  []      Improving appropriately and progressing toward goals  [x]      Improving slowly and progressing toward goals  []      Not making progress toward goals and plan of care will be adjusted     PLAN:  Patient continues to benefit from skilled intervention to address the above impairments. Continue treatment per established plan of care. Discharge Recommendations:  [address]  Further Equipment Recommendations for Discharge:  bedside commode and rolling walker     SUBJECTIVE:   Patient stated Let's see what we can do.     OBJECTIVE DATA SUMMARY:   Critical Behavior:  Neurologic State: Alert  Orientation Level: Oriented X4  Cognition: Follows commands  Safety/Judgement: Awareness of environment, Decreased awareness of need for assistance, Decreased awareness of need for safety, Lack of insight into deficits  Functional Mobility Training:  Bed Mobility:  Rolling: Minimum assistance  Supine to Sit: Maximum assistance  Sit to Supine: Maximum assistance  Scooting: Maximum assistance  Transfers:  Sit to Stand: Maximum assistance  Stand to Sit: Maximum assistance  Balance:  Sitting: Intact  Standing: Impaired; With support  Standing - Static: Good  Standing - Dynamic : Fair  Ambulation/Gait Training:  Distance (ft): 2 Feet (ft) (L side steps)  Assistive Device: Walker, rolling;Gait belt  Ambulation - Level of Assistance: Supervision  Right Side Weight Bearing: Partial (%) (50)  Base of Support: Widened  Stance: Right decreased  Speed/Dayna: Slow  Step Length: Right shortened;Left shortened  Swing Pattern: Right symmetrical;Left symmetrical  Pain:  Pain Scale 1: Numeric (0 - 10)  Pain Intensity 1: 7   Pain out: 7  Pain Location 1: Knee  Pain Orientation 1: Right  Pain Intervention(s) 1: Medication (see MAR)  Activity Tolerance:   Fair   Please refer to the flowsheet for vital signs taken during this treatment.   After treatment:   [] Patient left in no apparent distress sitting up in chair  [x] Patient left in no apparent distress in bed  [x] Call bell left within reach  [x] Nursing notified  [x] Caregiver present  [] Bed alarm activated      Ham Krishnamurthy PTA   Time Calculation: 39 mins

## 2018-02-06 NOTE — PROGRESS NOTES
ID Progress Note    Reason for follow up : antibiotic management for  R knee arthroplasty infection. The requesting  physician is Natalia Bhatt MD.      Current  antibiotics:       Vancomycin, cefepime 1/31/18     Past antibiotics:       Impression:   Influenza A infection   R TKA infection s/p explantation and abx spacer placeemnt  2/2/18   Tissue cx 2/2 NGTD   S/p knee aspiration 1/23/18 as out patient , fluid cx shows NG   Suspected Sepsis due to above  -fever and AMS on admisison resolves   Recent R knee cellulitis 3 weeks ago treated with po abx for 10 days       Plan:   Complete total 10 courses of tamiflu   Plan to treat with 6 weeks of iv vancomycin and cefepime since his cultures are non revealing   He has picc line in place for out patient iv antibiotics treatment   Last vancomycin trough on 2/2/18 was 16     IV Antibiotic Discharge Instructions    OPAT placed in epic for 6 weeks of IV antibiotics   Last day of antibiotics will be  3/16/18  Our service will be following only for antibiotics management as out patient. PICC line routine care will be done by home health/ out patient infusion center or rehab facility    D/C PICC at end of treatment with antibiotics, no exceptions. CBC diff every Monday , BUN, Cr every Monday and Thursday while on antibiotics   Weekly Lab results to be faxed to dr Philippe Feldman  ( out patient ID physician )   Pharmacy to adjust antibiotics  Keep vancomycin trough 15 to 20  I d/w pt about the side effects of Abx (ie GI upset, allergic reactions, nephrotoxicity, hepatotoxicity etc.) and possible PICC line complications (ie infections, DVT, PE etc.). Pt was advised to call us or to go to ED, ASAP, if he develops any of the above problems.    The patient is being discharged from [facility]  and will be followed by  Dr Jeri Espinoza office for ID follow up in 2 to  3 weeks     We will adjust antibiotics upon the results of the pending tests and the clinical improvement of the patient. /Discharge planners: Kindly send a copy of this note to the NH so that they can follow our recommendations. Katie Aquino MD  2018  11:48 AM      Case discussed with: [X]Patient [X]Family [X]Nursing [X ]Case Management  [ ] Brenden RAHMAN/Care team     I discussed with Sherry Chavis about the management, prognosis, treatment and complications of the above infectious disease conditions. All patient's questions were answered in detail. He voiced understanding of the discussed issues. He agrees with current plan of therapy. Interval history    Patient condition is improving  Post-op, he was confused and hypotensive and moved to ICU for monitoring and now on the medical floor.        Review of Systems:   Interval notes, available laboratory, microbiology and radiographic data reviewed. Discussed with nursing and treatment  providers   Denies fever , chills, cough, shortness of breath, sputum production,hemoptysis, abdominal pain, nausea, vomiting,diarrhoea,dysuria, urgency, joint pain or swelling , skin rash. Physical Assessment:     VITAL SIGNS  Blood pressure 184/70, pulse 78, temperature 98 °F (36.7 °C), resp. rate 18, height 5' 5\" (1.651 m), weight 92.8 kg (204 lb 8 oz), SpO2 99 %. Temp (24hrs), Av.2 °F (36.8 °C), Min:97.9 °F (36.6 °C), Max:98.6 °F (37 °C)    R UE picc line in place  Site and intact     GEN: not distressed  Eyes:  EOM, No icterus. Normal conjunctiva  ENMT: Lips without lesions  Neck:supple , No masses  LUNGS:CTA BL lungs    CVS EXM: RRR, No S4 or S3   Abdomen: Soft, Non Tender, Bowel Sounds normal  Musculoskeletal: moving all 4 extr , R knee in post op dressing   SKIN:Warm, dry; as above otherwise no rashes   Neuro/Psych:  Alert.  Oriented x3                            Follows commands          MICROBIOLOGY DATA    Reviewed   2/2 tissue cultures NGTD      LAB DATA    Recent Results (from the past 24 hour(s))   GLUCOSE, POC Collection Time: 02/05/18 11:24 AM   Result Value Ref Range    Glucose (POC) 92 70 - 110 mg/dL   GLUCOSE, POC    Collection Time: 02/05/18  4:01 PM   Result Value Ref Range    Glucose (POC) 150 (H) 70 - 110 mg/dL   GLUCOSE, POC    Collection Time: 02/05/18 10:25 PM   Result Value Ref Range    Glucose (POC) 117 (H) 70 - 541 mg/dL   METABOLIC PANEL, BASIC    Collection Time: 02/06/18  3:43 AM   Result Value Ref Range    Sodium 140 136 - 145 mmol/L    Potassium 3.2 (L) 3.5 - 5.5 mmol/L    Chloride 107 100 - 108 mmol/L    CO2 26 21 - 32 mmol/L    Anion gap 7 3.0 - 18 mmol/L    Glucose 102 (H) 74 - 99 mg/dL    BUN 9 7.0 - 18 MG/DL    Creatinine 0.76 0.6 - 1.3 MG/DL    BUN/Creatinine ratio 12 12 - 20      GFR est AA >60 >60 ml/min/1.73m2    GFR est non-AA >60 >60 ml/min/1.73m2    Calcium 8.1 (L) 8.5 - 10.1 MG/DL   CBC WITH AUTOMATED DIFF    Collection Time: 02/06/18  3:43 AM   Result Value Ref Range    WBC 3.2 (L) 4.6 - 13.2 K/uL    RBC 2.71 (L) 4.70 - 5.50 M/uL    HGB 7.5 (L) 13.0 - 16.0 g/dL    HCT 22.7 (L) 36.0 - 48.0 %    MCV 83.8 74.0 - 97.0 FL    MCH 27.7 24.0 - 34.0 PG    MCHC 33.0 31.0 - 37.0 g/dL    RDW 17.4 (H) 11.6 - 14.5 %    PLATELET 82 (L) 073 - 420 K/uL    MPV 9.0 (L) 9.2 - 11.8 FL    NEUTROPHILS 53 40 - 73 %    LYMPHOCYTES 33 21 - 52 %    MONOCYTES 13 (H) 3 - 10 %    EOSINOPHILS 1 0 - 5 %    BASOPHILS 0 0 - 2 %    ABS. NEUTROPHILS 1.7 (L) 1.8 - 8.0 K/UL    ABS. LYMPHOCYTES 1.0 0.9 - 3.6 K/UL    ABS. MONOCYTES 0.4 0.05 - 1.2 K/UL    ABS. EOSINOPHILS 0.0 0.0 - 0.4 K/UL    ABS.  BASOPHILS 0.0 0.0 - 0.06 K/UL    DF AUTOMATED     GLUCOSE, POC    Collection Time: 02/06/18  6:31 AM   Result Value Ref Range    Glucose (POC) 95 70 - 110 mg/dL           IMAGING     I reviewed images CXR from 1/31/18 - official report showed Mildly underexpanded lungs without superimposed acute radiographic abnormality        Current medications reviewed in EPIC    Scheduled medication     Current Facility-Administered Medications Medication Dose Route Frequency    potassium chloride (KLOR-CON) packet 40 mEq  40 mEq Oral BID WITH MEALS    0.9% sodium chloride infusion 250 mL  250 mL IntraVENous PRN    sodium chloride (NS) flush 5-10 mL  5-10 mL IntraVENous Q8H    sodium chloride (NS) flush 5-10 mL  5-10 mL IntraVENous PRN    HYDROmorphone (PF) (DILAUDID) injection 1 mg  1 mg IntraVENous Q4H PRN    naloxone (NARCAN) injection 0.4 mg  0.4 mg IntraVENous PRN    ferrous sulfate tablet 325 mg  1 Tab Oral BID WITH MEALS    ondansetron (ZOFRAN) injection 4 mg  4 mg IntraVENous Q4H PRN    docusate sodium (COLACE) capsule 100 mg  100 mg Oral BID    enoxaparin (LOVENOX) injection 40 mg  40 mg SubCUTAneous DAILY    glucose chewable tablet 16 g  4 Tab Oral PRN    glucagon (GLUCAGEN) injection 1 mg  1 mg IntraMUSCular PRN    dextrose (D50W) injection syrg 12.5-25 g  25-50 mL IntraVENous PRN    Vancomycin - Pharmacy to dose   1 Each Other Rx Dosing/Monitoring    0.9% sodium chloride infusion 250 mL  250 mL IntraVENous PRN    insulin lispro (HUMALOG) injection   SubCUTAneous AC&HS    famotidine (PEPCID) tablet 20 mg  20 mg Oral BID    cefepime (MAXIPIME) 2 g in sterile water (preservative free) 10 mL IV syringe  2 g IntraVENous Q12H    vancomycin (VANCOCIN) 1,000 mg in 0.9% sodium chloride (MBP/ADV) 250 mL adv  1,000 mg IntraVENous Q12H    traMADol (ULTRAM) tablet 50 mg  50 mg Oral Q6H PRN    acetaminophen (TYLENOL) tablet 650 mg  650 mg Oral Q6H PRN    traZODone (DESYREL) tablet 50 mg  50 mg Oral QHS    alfuzosin SR (UROXATRAL) tablet 10 mg  10 mg Oral DAILY    atorvastatin (LIPITOR) tablet 20 mg  20 mg Oral DAILY    bethanechol (URECHOLINE) tablet 25 mg  25 mg Oral TID    0.9% sodium chloride infusion  100 mL/hr IntraVENous CONTINUOUS         Medical Decision Making:     I reviewed and summarized data from old medical records and obtained history from other sources than patient.    I reviewed laboratory tests, Radiology data, and diagnostic tests in the CPT medicine section. I discussed with the physicians and the nursed involved in this patient's care about this patient's current medical problems. I have  Discussed plan of care with patient and nursing staff. The total visit duration was of40   minutes of which more than 50% was spent in coordination of care and counseling (time spent with patient/family face to face, physical exam, reviewing microbiology data, laboratory results, radiographic data, speaking with physicians and nursing staff involved in this pt's care).

## 2018-02-06 NOTE — PROGRESS NOTES
CM called and LM with Champ Cowart at [facility] and informed him of the abx pt will be on at discharge [phone]. Per Dr. Didier Echols, pt will be on cefepime and Vanco BID, for 6 weeks. Spoke with director of nursing and informed her pt would be on abx, she will 'price out' abx and get back with CM.      [phone] with Nichole, director of nursing at [facility]. She is able to accept pt today. She is aware pt will be on cefepime and vanco at discharge for 6 weeks. Per MD Torres, pts cultures are negative and pt will be on abx as \"broad spectrum\" coverage. 1300 [facility] pts wife, Jung Weems and left message for her to call back regarding discharge. Wife called back immed and stated she would be at hospital within the hour. 1324 [facility] with Libia, primary RN and made her aware pt could dc today to [facility]. She informed  pt need a unit of blood prior to discharge. 1325  Met with patient at bedside. Informed pt he had been accepted at [facility] and will transfer to rehab today. He verbally agreed with plan. No other concerns.

## 2018-02-06 NOTE — PROGRESS NOTES
Problem: Self Care Deficits Care Plan (Adult)  Goal: *Acute Goals and Plan of Care (Insert Text)  Initial Occupational Therapy Goals (2/5/2018) Within 7 day(s):    1. Patient will perform grooming seated EOB with setup & 1-2 verbal cues for attention to task  for increased independence in ADLs. 2. Patient will perform UB dressing with setup/min & 1-2 verbal cues for attention to task seated EOB for increased independence with ADLs. 3. Patient will perform LB dressing with modA & A/E PRN for increased independence with ADLs. 4. Patient will perform all aspects of toileting with modA for increased independence with ADLs. 5. Patient will perform LB ADLs utilizing body mechanics & adaptive strategies with 1 verbal cue for increased safety in ADLs. 6. Patient will independently apply energy conservation techniques with 1 verbal cue for increased independence with ADLs. Occupational Therapy Treatment Attempt    Chart reviewed. Attempted Occupational Therapy Treatment, however, patient unable to be seen due to:  []  Nausea/vomiting  []  Eating  []  Pain  []  Patient too lethargic  []  Off Unit for testing/procedure  []  Dialysis treatment in progress  []  Telemetry Results  [x]  Other:  Pt getting blood transfusion & will be transferred to Rehab s/p transfusion    Will f/u later as patient's schedule allows.    Thank you for this referral.  Jolene Panda, OTR/L

## 2018-02-06 NOTE — PROGRESS NOTES
Shift Progress Note:  Assumed care of patient in bed awake and alert. Medicated x1 fo back pain. Black immobilizer remains on right leg elevated on pillow. PICC line with good blood return. Uneventful night VS, call bell within reach.   Patient Vitals for the past 12 hrs:   Temp Pulse Resp BP SpO2   02/06/18 0725 98 °F (36.7 °C) 78 18 184/70 99 %   02/06/18 0455 98.4 °F (36.9 °C) 80 18 161/71 98 %   02/05/18 2357 98.2 °F (36.8 °C) 88 16 170/61 98 %

## 2018-02-07 VITALS
BODY MASS INDEX: 34.02 KG/M2 | SYSTOLIC BLOOD PRESSURE: 160 MMHG | RESPIRATION RATE: 18 BRPM | HEART RATE: 74 BPM | HEIGHT: 65 IN | TEMPERATURE: 97.9 F | OXYGEN SATURATION: 98 % | WEIGHT: 204.2 LBS | DIASTOLIC BLOOD PRESSURE: 70 MMHG

## 2018-02-07 LAB
ABO + RH BLD: NORMAL
ANION GAP SERPL CALC-SCNC: 8 MMOL/L (ref 3–18)
BACTERIA SPEC CULT: NORMAL
BASOPHILS # BLD: 0 K/UL (ref 0–0.06)
BASOPHILS NFR BLD: 0 % (ref 0–2)
BLD PROD TYP BPU: NORMAL
BLOOD GROUP ANTIBODIES SERPL: NORMAL
BPU ID: NORMAL
BUN SERPL-MCNC: 9 MG/DL (ref 7–18)
BUN/CREAT SERPL: 11 (ref 12–20)
CALCIUM SERPL-MCNC: 8.4 MG/DL (ref 8.5–10.1)
CALLED TO:,BCALL1: NORMAL
CHLORIDE SERPL-SCNC: 107 MMOL/L (ref 100–108)
CO2 SERPL-SCNC: 28 MMOL/L (ref 21–32)
CREAT SERPL-MCNC: 0.85 MG/DL (ref 0.6–1.3)
CROSSMATCH RESULT,%XM: NORMAL
DIFFERENTIAL METHOD BLD: ABNORMAL
EOSINOPHIL # BLD: 0.1 K/UL (ref 0–0.4)
EOSINOPHIL NFR BLD: 2 % (ref 0–5)
ERYTHROCYTE [DISTWIDTH] IN BLOOD BY AUTOMATED COUNT: 16.7 % (ref 11.6–14.5)
GLUCOSE SERPL-MCNC: 106 MG/DL (ref 74–99)
GRAM STN SPEC: NORMAL
HCT VFR BLD AUTO: 25.5 % (ref 36–48)
HGB BLD-MCNC: 8.3 G/DL (ref 13–16)
LYMPHOCYTES # BLD: 1.3 K/UL (ref 0.9–3.6)
LYMPHOCYTES NFR BLD: 36 % (ref 21–52)
MCH RBC QN AUTO: 27.3 PG (ref 24–34)
MCHC RBC AUTO-ENTMCNC: 32.5 G/DL (ref 31–37)
MCV RBC AUTO: 83.9 FL (ref 74–97)
MONOCYTES # BLD: 0.6 K/UL (ref 0.05–1.2)
MONOCYTES NFR BLD: 15 % (ref 3–10)
NEUTS SEG # BLD: 1.7 K/UL (ref 1.8–8)
NEUTS SEG NFR BLD: 47 % (ref 40–73)
PLATELET # BLD AUTO: 94 K/UL (ref 135–420)
PMV BLD AUTO: 9.1 FL (ref 9.2–11.8)
POTASSIUM SERPL-SCNC: 3.4 MMOL/L (ref 3.5–5.5)
RBC # BLD AUTO: 3.04 M/UL (ref 4.7–5.5)
SERVICE CMNT-IMP: NORMAL
SODIUM SERPL-SCNC: 143 MMOL/L (ref 136–145)
SPECIMEN EXP DATE BLD: NORMAL
STATUS OF UNIT,%ST: NORMAL
UNIT DIVISION, %UDIV: 0
WBC # BLD AUTO: 3.6 K/UL (ref 4.6–13.2)

## 2018-02-07 PROCEDURE — 85025 COMPLETE CBC W/AUTO DIFF WBC: CPT | Performed by: PHYSICIAN ASSISTANT

## 2018-02-07 PROCEDURE — 36415 COLL VENOUS BLD VENIPUNCTURE: CPT | Performed by: PHYSICIAN ASSISTANT

## 2018-02-07 PROCEDURE — 74011250637 HC RX REV CODE- 250/637: Performed by: PHYSICIAN ASSISTANT

## 2018-02-07 PROCEDURE — 74011000250 HC RX REV CODE- 250: Performed by: EMERGENCY MEDICINE

## 2018-02-07 PROCEDURE — 74011250637 HC RX REV CODE- 250/637: Performed by: INTERNAL MEDICINE

## 2018-02-07 PROCEDURE — 80048 BASIC METABOLIC PNL TOTAL CA: CPT | Performed by: PHYSICIAN ASSISTANT

## 2018-02-07 PROCEDURE — 74011250636 HC RX REV CODE- 250/636: Performed by: EMERGENCY MEDICINE

## 2018-02-07 RX ADMIN — FAMOTIDINE 20 MG: 20 TABLET, FILM COATED ORAL at 08:08

## 2018-02-07 RX ADMIN — ATORVASTATIN CALCIUM 20 MG: 20 TABLET, FILM COATED ORAL at 08:08

## 2018-02-07 RX ADMIN — DOCUSATE SODIUM 100 MG: 100 CAPSULE, LIQUID FILLED ORAL at 08:08

## 2018-02-07 RX ADMIN — Medication 10 ML: at 00:09

## 2018-02-07 RX ADMIN — WATER 2 G: 1 INJECTION INTRAMUSCULAR; INTRAVENOUS; SUBCUTANEOUS at 00:05

## 2018-02-07 RX ADMIN — Medication 10 ML: at 07:23

## 2018-02-07 RX ADMIN — FERROUS SULFATE TAB 325 MG (65 MG ELEMENTAL FE) 325 MG: 325 (65 FE) TAB at 08:08

## 2018-02-07 RX ADMIN — ALFUZOSIN HYDROCHLORIDE 10 MG: 10 TABLET ORAL at 08:08

## 2018-02-07 RX ADMIN — BETHANECHOL CHLORIDE 25 MG: 10 TABLET ORAL at 07:28

## 2018-02-07 RX ADMIN — POTASSIUM CHLORIDE 40 MEQ: 1.5 POWDER, FOR SOLUTION ORAL at 08:08

## 2018-02-07 RX ADMIN — BETHANECHOL CHLORIDE 25 MG: 10 TABLET ORAL at 00:09

## 2018-02-07 NOTE — PROGRESS NOTES
Progress Note      Patient: Manju Yanes               Sex: male          DOA: 1/31/2018       YOB: 1933      Age:  80 y.o.        LOS:  LOS: 7 days     Status Post: Procedure(s):  EXPLANT EJ PROSTHETIC RIGHT KNEE, SPACER INSERTION, INCISION AND DRAINAGE RIGHT KNEE, PATIENT IS IN ROOM 346  Surgery Date: 2/2/2018            Subjective:     Manju Yanes is a 80 y.o. male who c/o right knee pain. Stable, no changes. Plans for discharge to rehab this morning. Objective:      Visit Vitals    /67 (BP 1 Location: Right arm, BP Patient Position: At rest)    Pulse 74    Temp 97.9 °F (36.6 °C)    Resp 17    Ht 5' 5\" (1.651 m)    Wt 92.8 kg (204 lb 8 oz)    SpO2 97%    BMI 34.03 kg/m2       Physical Exam:   Dressing:  clean, dry, intact   Wiggles Toes/Ankle  Foot sensation intact to light touch  No foot edema/ +1 Posterior Tibial Pulse    Intake and Output:  Current Shift:     Last three shifts:  02/05 0701 - 02/06 1900  In: 2308.3 [P.O.:860; I.V.:1448.3]  Out: 400 [Urine:400]  Voiding Status:  +void without need for siu catheter    Lab/Data Reviewed:  Recent Labs      02/07/18   0447   HGB  8.3*   HCT  25.5*   NA  143   K  3.4*   CL  107   CO2  28   BUN  9   CREA  0.85   GLU  106*       Medications Reviewed    Assessment/Plan     Principal Problem:    Infected [facility] joint ([facility] (1/31/2018)    Active Problems:    Diabetes mellitus type 2, controlled ([address] (5/9/2017)      Hyperlipidemia (5/9/2017)      HTN (hypertension) (5/9/2017)      Sepsis ([facility] (1/31/2018)      Syncope (1/31/2018)      Influenza (2/2/2018)        · Continue DVT Prophylaxis. · D/C to rehab this morning  · Continue aggressive PT and ROM   · Continue IV abx with f/up with ID in 2-3 weeks  · F/up 2 weeks in office with Dr Anurag Esposito                   The patient was seen and examined by Dr. Anurag Esposito today as well and he is in agreement with above.

## 2018-02-07 NOTE — PROGRESS NOTES
831 TRANSFER - OUT REPORT:    Verbal report given to serjio Gaston LPN(name) on Saldana Solo  being transferred to [facility](unit) for routine progression of care       Report consisted of patients Situation, Background, Assessment and   Recommendations(SBAR). Information from the following report(s) SBAR was reviewed with the receiving nurse. Lines:   PICC Double Lumen 02/05/18 Right;Other(comment) (Active)   Central Line Being Utilized Yes 2/7/2018  7:45 AM   Criteria for Appropriate Use Other (comment) 2/7/2018  7:45 AM   Site Assessment Clean, dry, & intact 2/7/2018  7:45 AM   Phlebitis Assessment 0 2/7/2018  7:45 AM   Infiltration Assessment 0 2/7/2018  7:45 AM   Date of Last Dressing Change 02/05/18 2/6/2018  5:00 PM   Dressing Status Clean, dry, & intact 2/7/2018  7:45 AM   Action Taken Open ports on tubing capped 2/7/2018  7:45 AM   Dressing Type Tape;Transparent 2/7/2018  7:45 AM   Hub Color/Line Status Red;Flushed;Capped 2/7/2018  7:45 AM   Positive Blood Return (Site #1) Yes 2/7/2018  7:45 AM   Hub Color/Line Status Purple;Flushed;Capped 2/7/2018  7:45 AM   Positive Blood Return (Site #2) Yes 2/7/2018  7:45 AM   Alcohol Cap Used Yes 2/7/2018  7:45 AM       Peripheral IV 02/02/18 Left Hand (Active)   Site Assessment Clean, dry, & intact 2/7/2018  7:45 AM   Phlebitis Assessment 0 2/7/2018  7:45 AM   Infiltration Assessment 0 2/7/2018  7:45 AM   Dressing Status Clean, dry, & intact 2/7/2018  7:45 AM   Dressing Type Tape;Transparent 2/7/2018  7:45 AM   Hub Color/Line Status Green;Flushed;Capped 2/7/2018  7:45 AM   Action Taken Open ports on tubing capped 2/7/2018  7:45 AM   Alcohol Cap Used Yes 2/7/2018  7:45 AM        Opportunity for questions and clarification was provided.       Patient transported with:   Patient-specific medications from Pharmacy

## 2018-02-08 NOTE — PROGRESS NOTES
1930: Assumed patient care, he was alert and oriented to person, place, time and situation. Respiratory status was stable on room air. Vital signs were stable. MEWS score was a one. Patient denied any nausea vomiting dizziness or anxiety. White board was updated and explained. Bed was locked and in lowest position. Call bell, water and personal belongings were within reach. Patient had no questions, comments or concerns after bedside shift report. The documentation for this period is being entered following the guidelines as defined in the [address] [place] [address] downtime policy by Barb Louie RN.     0700: Patient had an uneventful shift. Respiratory status, vital signs and MEWS score remained stable. Patient was resting quietly with no signs of distress noted. Bed locked and in lowest position. Call bell water and personal belongings were within reach. Patient had no questions, comments or concerns after bedside shift report.  Bedside report given to 5960  106 Candace CAPONE

## 2018-02-09 NOTE — OP NOTES
[facility]  OPERATIVE REPORT    Name:Wes GEORGE. MR#: [id number]  : 1933  ACCOUNT #: [de-identified]   DATE OF SERVICE: 2018    PREOPERATIVE DIAGNOSIS:  Complication prosthetic device, right knee. POSTOPERATIVE DIAGNOSIS:  Complication prosthetic device, right knee, with infection. PROCEDURE PERFORMED:  Explant of right total knee with deep I and D, debridement, synovial excision, implant of non-biodegradable articulated spacer. SURGEON:  Leny Petersen MD.     ANESTHESIA:  General with adductor canal blockade. ASSISTANT:  KENDALL Michelle.     ESTIMATED BLOOD LOSS:  50 mL. IV FLUIDS:  2000 mL. TOURNIQUET TIME:  120 minutes at 300 mmHg. SPECIMENS REMOVED:  Soft tissue, synovial and aspiration. COMPLICATIONS:  none    FINDINGS:  Gross infection, purulence and bone infection about the posterior lateral femoral condyle. IMPLANTS:   articulating spacer with cement mixed with vancomycin and gentamicin, tobramycin, additional cement fixation loosely again with gentamicin, tobramycin and vancomycin. 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 A failure to rupture and revise and then developed a postoperative infection. It is noted to have increased swelling. He was evaluated with one of my partners and sent to me for further evaluation. Aspiration yielded gross purulence and Synovasure was positive for alpha snf, but no growth on aspiration and culture sensitivities without incidence and positive and gross purulence on aspiration. Continue with plans for explant of the knee. On the date of evaluation,  risks and benefits were reviewed, consent was obtained and was evaluated by anesthesia. He was taken to the surgical suite, placed in supine position.   After general anesthetic, the extremity was then prepped and draped in routine fashion. Nonsterile tourniquet was placed on the upper thigh before prep and drape and a surgical timeout was called. Once this was completed, utilizing the previous incision line, incision was made developing a soft tissue and gained access. The extensor mechanism was noted to be disrupted. There was  scar tissue that could be branched hopefully to regain the extensor mechanism which was retained for later. Upon opening the capsule was noted to have gross purulent return. This was aspirated and sent for culture. Additional soft tissue and synovium was dissected and sent to pathology. A complete synovectomy was then completed followed by evaluation of the implant which was noted to have deficiency about the lateral femoral condyle. At this point, we are continuing plans for explant. The femoral portion was addressed first with osteotomes in sequential fashion we were able to explant the femoral component without difficulty followed by the tibial component in a likewise fashion. With the component eplanted, additional bony debridement and freshening cut of the bone down to a stable base to good healthy bone tissue. Additional posterior capsular dissection and synovial resection was then performed. It was then copiously irrigated with 9 liters of saline, 6 liters which was bacitracin infused. Additional intermittent use with dilute Betadine and dilute peroxide was also used. At this time, we utilized a mold for an articulating spacer implant. This was mixed with gentamicin, vancomycin and tobramycin 1 gram each per 20 grams of cement. Once cement was allowed to cure, additional cement was then formed around Reji pins which is fixed to the femoral portion as well as the tibial poly which was selected to size 15 for stability in flexion, extension on trial reduction.   Once this was completed, another batch of cement was mixed with the 1 gram of vancomycin, 1 gram tobramycin and 1 gram of gentamicin per 20 grams of DePuy cement and this was utilized to fix the components to the bone and cover the bony surface area. Once this was allowed to cure, I took it through range of motion. Has general stability in flexion-extension and a small zoey of this was also placed on the undersurface of the patella. With good stability of the knee, we continued with closure of the medial retinacular surfaces using non-porous suture of looped PDS #1. This was utilized in a running locked fashion up to the portion of the quad rupture was noted to be deficient. Imbricating stitch was then utilized to advance the medial retinacula as well as the VMO and advanced the remaining portion of the quad to the superior pole of the patella. With the superior capsular closure, cutaneous stitches were performed with a #2 horizontal mattress nylon. Sterile dressings were then placed as well as a long Ace. We will continue with limitations of weightbearing, appropriate IV antibiotics per infectious disease, which was consulted and an immobilizer for stability until followup in the office.       MD Yanna Alfredo / Bay Nuno  D: 02/08/2018 20:00     T: 02/09/2018 09:54  JOB #: [id number]

## 2018-02-09 NOTE — OP NOTES
[facility]  OPERATIVE REPORT    Name:Aleyda GEORGE. MR#: [id number]  : 1933  ACCOUNT #: [de-identified]   DATE OF SERVICE: 2018    PREOPERATIVE DIAGNOSIS:  Complication prosthetic device, right knee. POSTOPERATIVE DIAGNOSIS:  Complication prosthetic device, right knee, with infection. PROCEDURE PERFORMED:  Explant of right total knee with deep I and D, debridement, synovial excision, implant of non-biodegradable articulated spacer. SURGEON:  Cb Kauffman DO.     ANESTHESIA:  General with adductor canal blockade. ASSISTANT:  KENDALL Michelle.     ESTIMATED BLOOD LOSS:  50 mL. IV FLUIDS:  2000 mL. TOURNIQUET TIME:  120 minutes at 300 mmHg. SPECIMENS REMOVED:  Soft tissue, synovial and aspiration. COMPLICATIONS:  none    FINDINGS:  Gross infection, purulence and bone erosion about the posterior lateral femoral condyle. IMPLANTS:  antibiotic articulating spacer with cement mixed with vancomycin and gentamicin, tobramycin, additional cement fixation loosely again with gentamicin, tobramycin and vancomycin. 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 A failure to rupture and revise and then developed a postoperative infection. It is noted to have increased swelling. He was evaluated with one of my partners and sent to me for further evaluation. Aspiration yielded gross purulence and Synovasure was positive for alpha prison, but no growth on aspiration and culture sensitivities without incidence and positive and gross purulence on aspiration. Continue with plans for explant of the knee. On the date of evaluation,  risks and benefits were reviewed, consent was obtained and was evaluated by anesthesia. He was taken to the surgical suite, placed in supine position.   After general anesthetic, the extremity was then prepped and draped in routine fashion. Nonsterile tourniquet was placed on the upper thigh before prep and drape and a surgical timeout was called. Once this was completed, utilizing the previous incision line, incision was made developing a soft tissue and gained access. The extensor mechanism was noted to be disrupted. There was  scar tissue that could be branched hopefully to regain the extensor mechanism which was retained for later. Upon openning capsule was noted to have gross purulent return. This was aspirated and sent for culture. Additional soft tissue and synovium was dissected and sent to pathology. A complete synovectomy was then completed followed by evaluation of the implant which was noted to have deficiency about the lateral femoral condyle. At this point, we are continuing plans for explant. The femoral portion was addressed first with osteotomes in sequentrial fashion we were able to explant the femoral component without difficulty followed by the tibial component in a likewise fashion. With the component explanted, additional bony debridement and freshening cut of the bone down to a stable base to good healthy bone tissue. Additional posterior capsular dissection and synovial resection was then performed. It was then copiously irrigated with 9 liters of saline, 6 liters which was bacitracin infused. Additional intermittent use with dilute Betadine and dilute peroxide was also used. At this time, we utilized a mold form for an articulating spacer implant. This was mixed with gentamicin, vancomycin and tobramycin 1 gram each per 20 grams of cement. Once this was allowed to cure, additional cement was then formed around k- wire pins which is fixed to the femoral portion as well as the tibial poly which was selected to size 15 for stability in flexion, extension on trial reduction.   Once this was completed, another batch of cement was mixed with the 1 gram of vancomycin, 1 gram tobramycin and 1 gram of gentamicin per 20 grams of DePuy cement and this was utilized to fix the components to the bone and cover the bony surface area. Once this was allowed to cure, I took it through range of motion. Has general stability in flexion-extension and a small zoey of antibiotic cement was also placed on the undersurface of the patella. With good stability of the knee, we continued with closure of the medial retinacular surfaces using non-porous suture of looped PDS #1. This was utilized in a running locked fashion up to the portion of the quad rupture was noted to be deficient. Imbricating stitch was then utilized to advance the medial retinacula as well as the VMO and advanced the remaining portion of the quad to the superior pole of the patella. With the superior capsular closure, cutaneous stitches were performed with a #2 horizontal mattress nylon. Sterile dressings were then placed as well as a long Ace. We will continue with limitations of weightbearing, appropriate IV antibiotics per infectious disease, which was consulted and an immobilizer for stability until followup in the office.       MD Parul Barry / Patti.Mat  D: 02/08/2018 20:00     T: 02/09/2018 09:54  JOB #: [id number]

## 2018-02-10 LAB
ATRIAL RATE: 102 BPM
ATRIAL RATE: 67 BPM
CALCULATED P AXIS, ECG09: 15 DEGREES
CALCULATED P AXIS, ECG09: 21 DEGREES
CALCULATED R AXIS, ECG10: 19 DEGREES
CALCULATED R AXIS, ECG10: 22 DEGREES
CALCULATED T AXIS, ECG11: -17 DEGREES
CALCULATED T AXIS, ECG11: 66 DEGREES
DIAGNOSIS, 93000: NORMAL
DIAGNOSIS, 93000: NORMAL
P-R INTERVAL, ECG05: 182 MS
P-R INTERVAL, ECG05: 190 MS
Q-T INTERVAL, ECG07: 332 MS
Q-T INTERVAL, ECG07: 422 MS
QRS DURATION, ECG06: 76 MS
QRS DURATION, ECG06: 78 MS
QTC CALCULATION (BEZET), ECG08: 432 MS
QTC CALCULATION (BEZET), ECG08: 445 MS
VENTRICULAR RATE, ECG03: 102 BPM
VENTRICULAR RATE, ECG03: 67 BPM

## 2018-02-12 ENCOUNTER — HOSPITAL ENCOUNTER (OUTPATIENT)
Dept: LAB | Age: 83
Discharge: HOME | End: 2018-02-12
Payer: MEDICARE

## 2018-02-12 LAB
DATE LAST DOSE: ABNORMAL
REPORTED DOSE,DOSE: ABNORMAL UNITS
REPORTED DOSE/TIME,TMG: ABNORMAL
VANCOMYCIN PEAK SERPL-MCNC: 25.2 UG/ML (ref 30–40)

## 2018-02-12 PROCEDURE — 80202 ASSAY OF VANCOMYCIN: CPT | Performed by: INTERNAL MEDICINE

## 2018-02-12 PROCEDURE — 36415 COLL VENOUS BLD VENIPUNCTURE: CPT | Performed by: INTERNAL MEDICINE

## 2020-10-22 NOTE — ROUTINE PROCESS
Bedside and Verbal shift change report given to JUAQUIN Diaz (oncoming nurse) by Lois Nair (offgoing nurse). Report included the following information SBAR, Kardex, Intake/Output and MAR. Quinolones Counseling:  I discussed with the patient the risks of fluoroquinolones including but not limited to GI upset, allergic reaction, drug rash, diarrhea, dizziness, photosensitivity, yeast infections, liver function test abnormalities, tendonitis/tendon rupture.

## (undated) DEVICE — OCCLUSIVE GAUZE STRIP,3% BISMUTH TRIBROMOPHENATE IN PETROLATUM BLEND: Brand: XEROFORM

## (undated) DEVICE — SUT VCRL + 2-0 36IN CT1 UD --

## (undated) DEVICE — ABDOMINAL PAD: Brand: DERMACEA

## (undated) DEVICE — DEVON™ KNEE AND BODY STRAP 60" X 3" (1.5 M X 7.6 CM): Brand: DEVON

## (undated) DEVICE — TRANSITION ROM KNEE BRACE: Brand: DEROYAL

## (undated) DEVICE — DRESSING FOAM SELF ADH 20X10 CM ABSORBENT MEPILEX BORDER

## (undated) DEVICE — THE CANADY HYBRID PLASMA SCALPEL IS AN ELECTROSURGICAL PLASMA SCALPEL THAT USES AN 85MM BENDABLE PADDLE BLADE TIP. THE ELECTROSURGICAL PLASMA SCALPEL IS USED TO SIMULTANEOUSLY CUT AND COAGULATE BIOLOGICAL TISSUE.: Brand: CANADY HYBRID PLASMA PADDLE BLADE

## (undated) DEVICE — REM POLYHESIVE ADULT PATIENT RETURN ELECTRODE: Brand: VALLEYLAB

## (undated) DEVICE — SUT ETHLN 2 30IN LR DA BLK --

## (undated) DEVICE — INTENT TO BE USED WITH SUTURE MATERIAL FOR TISSUE CLOSURE: Brand: RICHARD-ALLAN® NEEDLE 1/2 CIRCLE TAPER

## (undated) DEVICE — SUT VCRL + 2-0 27IN CT2 UD -- 36/BX

## (undated) DEVICE — Z CONVERTED USE 2271386 BANDAGE COMPR W6INXL11YD WVN COTTON/ELASTIC CLP CLSR DBL

## (undated) DEVICE — STERILE POLYISOPRENE POWDER-FREE SURGICAL GLOVES WITH EMOLLIENT COATING: Brand: PROTEXIS

## (undated) DEVICE — SWAB CULT LIQ STUART AGR AERB MOD IN BRK SGL RAYON TIP PLAS 220099] BECTON DICKINSON MICRO]

## (undated) DEVICE — 3 BONE CEMENT MIXER: Brand: MIXEVAC

## (undated) DEVICE — GOWN,AURORA,NONRNF,XL,30/CS: Brand: MEDLINE

## (undated) DEVICE — DISPOSABLE TOURNIQUET CUFF SINGLE BLADDER, SINGLE PORT AND QUICK CONNECT CONNECTOR: Brand: COLOR CUFF

## (undated) DEVICE — PLUS HANDPIECE WITH SPRAY TIP: Brand: SURGILAV

## (undated) DEVICE — (D)PREP SKN CHLRAPRP APPL 26ML -- CONVERT TO ITEM 371833

## (undated) DEVICE — STERILE POLYISOPRENE POWDER-FREE SURGICAL GLOVES: Brand: PROTEXIS

## (undated) DEVICE — NEEDLE SPNL 20GA L3.5IN YEL HUB S STL REG WALL FIT STYL W/

## (undated) DEVICE — DRSG FOAM MEPILEX TRNSF 6X8IN --

## (undated) DEVICE — T5 HOOD WITH PEEL AWAY FACE SHIELD

## (undated) DEVICE — IMMOBILIZER KNEE UNIV L19IN FOR 12-24IN THGH FOAM T BAR

## (undated) DEVICE — GAUZE SPONGES,12 PLY: Brand: CURITY

## (undated) DEVICE — SOL IRRIGATION INJ NACL 0.9% 500ML BTL

## (undated) DEVICE — SOL IRR STRL H2O 1500ML BTL --

## (undated) DEVICE — CONCISE CEMENT SCULPS KIT: Brand: CONCISE

## (undated) DEVICE — STERILE TETRA-FLEX CF LF, 6IN X 11 YD: Brand: TETRA-FLEX™ CF

## (undated) DEVICE — SUT VCRL + 1 36IN CT1 VIO --

## (undated) DEVICE — SOLUTION IRRIG 3000ML LAC R FLX CONT

## (undated) DEVICE — SINGLE PORT MANIFOLD: Brand: NEPTUNE 2

## (undated) DEVICE — PREP CHLORAPREP 10.5 ML ORG --

## (undated) DEVICE — TRAY PHAR SYR 20ML PLAS LUERLOCK TIP N CTRL CONVENIENCE

## (undated) DEVICE — INTENDED FOR TISSUE SEPARATION, AND OTHER PROCEDURES THAT REQUIRE A SHARP SURGICAL BLADE TO PUNCTURE OR CUT.: Brand: BARD-PARKER SAFETY BLADES SIZE 10, STERILE

## (undated) DEVICE — KENDALL SCD EXPRESS FOOT CUFF, LARGE: Brand: KENDALL SCD

## (undated) DEVICE — SUTURE PDS + SZ 1 L96IN ABSRB VLT L65MM TP-1 1/2 CIR PDP880G

## (undated) DEVICE — UNDERCAST PADDING: Brand: DEROYAL

## (undated) DEVICE — SUT VCRL + 0 36IN CT1 UD --

## (undated) DEVICE — BLADE SAW W13XL90MM 1.19MM PARA

## (undated) DEVICE — SUTURE FIBERWIRE SZ 2 W/ TAPERED NEEDLE BLUE L38IN NONABSORB BLU L26.5MM 1/2 CIRCLE AR7200

## (undated) DEVICE — SYSTEM SKIN CLSR 22CM DERMBND PRINEO

## (undated) DEVICE — SUTURE RETRIEVER

## (undated) DEVICE — PACK PROCEDURE SURG TOT KNEE CUST

## (undated) DEVICE — TOTAL KNEE PACK-LF: Brand: MEDLINE INDUSTRIES, INC.

## (undated) DEVICE — SWAB CULT SGL AMIES W/O CHAR FOR THRT VAG SKIN HRT CULTSWAB